# Patient Record
Sex: MALE | Race: BLACK OR AFRICAN AMERICAN | NOT HISPANIC OR LATINO | Employment: UNEMPLOYED | URBAN - METROPOLITAN AREA
[De-identification: names, ages, dates, MRNs, and addresses within clinical notes are randomized per-mention and may not be internally consistent; named-entity substitution may affect disease eponyms.]

---

## 2018-04-09 ENCOUNTER — HOSPITAL ENCOUNTER (OUTPATIENT)
Facility: CLINIC | Age: 63
Setting detail: OBSERVATION
Discharge: HOME | End: 2018-04-10
Attending: EMERGENCY MEDICINE | Admitting: INTERNAL MEDICINE
Payer: COMMERCIAL

## 2018-04-09 ENCOUNTER — APPOINTMENT (OUTPATIENT)
Dept: GENERAL RADIOLOGY | Facility: CLINIC | Age: 63
End: 2018-04-09
Attending: EMERGENCY MEDICINE
Payer: COMMERCIAL

## 2018-04-09 DIAGNOSIS — R07.9 ACUTE CHEST PAIN: ICD-10-CM

## 2018-04-09 LAB
ANION GAP SERPL CALCULATED.3IONS-SCNC: 11 MMOL/L (ref 3–14)
BASOPHILS # BLD AUTO: 0 10E9/L (ref 0–0.2)
BASOPHILS NFR BLD AUTO: 0.2 %
BUN SERPL-MCNC: 13 MG/DL (ref 7–30)
CALCIUM SERPL-MCNC: 8.2 MG/DL (ref 8.5–10.1)
CHLORIDE SERPL-SCNC: 106 MMOL/L (ref 94–109)
CO2 SERPL-SCNC: 25 MMOL/L (ref 20–32)
CREAT SERPL-MCNC: 1.17 MG/DL (ref 0.66–1.25)
DIFFERENTIAL METHOD BLD: NORMAL
EOSINOPHIL # BLD AUTO: 0.2 10E9/L (ref 0–0.7)
EOSINOPHIL NFR BLD AUTO: 2.5 %
ERYTHROCYTE [DISTWIDTH] IN BLOOD BY AUTOMATED COUNT: 13.2 % (ref 10–15)
GFR SERPL CREATININE-BSD FRML MDRD: 63 ML/MIN/1.7M2
GLUCOSE SERPL-MCNC: 84 MG/DL (ref 70–99)
HCT VFR BLD AUTO: 45.6 % (ref 40–53)
HGB BLD-MCNC: 15.7 G/DL (ref 13.3–17.7)
IMM GRANULOCYTES # BLD: 0 10E9/L (ref 0–0.4)
IMM GRANULOCYTES NFR BLD: 0.2 %
LIPASE SERPL-CCNC: 315 U/L (ref 73–393)
LYMPHOCYTES # BLD AUTO: 1.8 10E9/L (ref 0.8–5.3)
LYMPHOCYTES NFR BLD AUTO: 29.5 %
MCH RBC QN AUTO: 30 PG (ref 26.5–33)
MCHC RBC AUTO-ENTMCNC: 34.4 G/DL (ref 31.5–36.5)
MCV RBC AUTO: 87 FL (ref 78–100)
MONOCYTES # BLD AUTO: 0.5 10E9/L (ref 0–1.3)
MONOCYTES NFR BLD AUTO: 8.5 %
NEUTROPHILS # BLD AUTO: 3.5 10E9/L (ref 1.6–8.3)
NEUTROPHILS NFR BLD AUTO: 59.1 %
NRBC # BLD AUTO: 0 10*3/UL
NRBC BLD AUTO-RTO: 0 /100
PLATELET # BLD AUTO: 165 10E9/L (ref 150–450)
POTASSIUM SERPL-SCNC: 3.3 MMOL/L (ref 3.4–5.3)
RBC # BLD AUTO: 5.23 10E12/L (ref 4.4–5.9)
SODIUM SERPL-SCNC: 142 MMOL/L (ref 133–144)
TROPONIN I SERPL-MCNC: <0.015 UG/L (ref 0–0.04)
WBC # BLD AUTO: 6 10E9/L (ref 4–11)

## 2018-04-09 PROCEDURE — 80048 BASIC METABOLIC PNL TOTAL CA: CPT | Performed by: EMERGENCY MEDICINE

## 2018-04-09 PROCEDURE — 83690 ASSAY OF LIPASE: CPT | Performed by: EMERGENCY MEDICINE

## 2018-04-09 PROCEDURE — 80076 HEPATIC FUNCTION PANEL: CPT | Performed by: EMERGENCY MEDICINE

## 2018-04-09 PROCEDURE — 25000128 H RX IP 250 OP 636: Performed by: EMERGENCY MEDICINE

## 2018-04-09 PROCEDURE — 25000132 ZZH RX MED GY IP 250 OP 250 PS 637: Performed by: EMERGENCY MEDICINE

## 2018-04-09 PROCEDURE — 93005 ELECTROCARDIOGRAM TRACING: CPT

## 2018-04-09 PROCEDURE — 99285 EMERGENCY DEPT VISIT HI MDM: CPT | Mod: 25

## 2018-04-09 PROCEDURE — 80307 DRUG TEST PRSMV CHEM ANLYZR: CPT | Performed by: EMERGENCY MEDICINE

## 2018-04-09 PROCEDURE — 99220 ZZC INITIAL OBSERVATION CARE,LEVL III: CPT | Performed by: INTERNAL MEDICINE

## 2018-04-09 PROCEDURE — 25000125 ZZHC RX 250: Performed by: EMERGENCY MEDICINE

## 2018-04-09 PROCEDURE — 85025 COMPLETE CBC W/AUTO DIFF WBC: CPT | Performed by: EMERGENCY MEDICINE

## 2018-04-09 PROCEDURE — 94640 AIRWAY INHALATION TREATMENT: CPT

## 2018-04-09 PROCEDURE — 71046 X-RAY EXAM CHEST 2 VIEWS: CPT

## 2018-04-09 PROCEDURE — 96374 THER/PROPH/DIAG INJ IV PUSH: CPT

## 2018-04-09 PROCEDURE — 93005 ELECTROCARDIOGRAM TRACING: CPT | Mod: 76

## 2018-04-09 PROCEDURE — 84484 ASSAY OF TROPONIN QUANT: CPT | Performed by: EMERGENCY MEDICINE

## 2018-04-09 RX ORDER — NITROGLYCERIN 0.4 MG/1
0.4 TABLET SUBLINGUAL EVERY 5 MIN PRN
Status: DISCONTINUED | OUTPATIENT
Start: 2018-04-09 | End: 2018-04-10

## 2018-04-09 RX ORDER — IPRATROPIUM BROMIDE AND ALBUTEROL SULFATE 2.5; .5 MG/3ML; MG/3ML
3 SOLUTION RESPIRATORY (INHALATION) ONCE
Status: COMPLETED | OUTPATIENT
Start: 2018-04-09 | End: 2018-04-09

## 2018-04-09 RX ORDER — ASPIRIN 81 MG/1
81 TABLET ORAL DAILY
COMMUNITY
End: 2018-07-19

## 2018-04-09 RX ORDER — KETOROLAC TROMETHAMINE 15 MG/ML
15 INJECTION, SOLUTION INTRAMUSCULAR; INTRAVENOUS ONCE
Status: COMPLETED | OUTPATIENT
Start: 2018-04-09 | End: 2018-04-09

## 2018-04-09 RX ORDER — ASPIRIN 81 MG/1
324 TABLET, CHEWABLE ORAL ONCE
Status: COMPLETED | OUTPATIENT
Start: 2018-04-09 | End: 2018-04-09

## 2018-04-09 RX ADMIN — NITROGLYCERIN 0.4 MG: 0.4 TABLET SUBLINGUAL at 22:38

## 2018-04-09 RX ADMIN — KETOROLAC TROMETHAMINE 15 MG: 15 INJECTION, SOLUTION INTRAMUSCULAR; INTRAVENOUS at 23:15

## 2018-04-09 RX ADMIN — ASPIRIN 81 MG 324 MG: 81 TABLET ORAL at 21:48

## 2018-04-09 RX ADMIN — NITROGLYCERIN 0.4 MG: 0.4 TABLET SUBLINGUAL at 22:46

## 2018-04-09 RX ADMIN — IPRATROPIUM BROMIDE AND ALBUTEROL SULFATE 3 ML: .5; 3 SOLUTION RESPIRATORY (INHALATION) at 22:04

## 2018-04-09 RX ADMIN — LIDOCAINE HYDROCHLORIDE 30 ML: 20 SOLUTION ORAL; TOPICAL at 22:56

## 2018-04-09 ASSESSMENT — ENCOUNTER SYMPTOMS
SHORTNESS OF BREATH: 1
DIZZINESS: 0
NAUSEA: 0
FEVER: 0
VOMITING: 0

## 2018-04-09 NOTE — IP AVS SNAPSHOT
MRN:[id number]                      After Visit Summary   4/9/2018    Abhilash Gonzalez    MRN: [id number]           Thank you!     Thank you for choosing [facility] for your care. Our goal is always to provide you with excellent care. Hearing back from our patients is one way we can continue to improve our services. Please take a few minutes to complete the written survey that you may receive in the mail after you visit with us. Thank you!        Patient Information     Date Of Birth          1955        About your hospital stay     You were admitted on:  April 9, 2018 [facility]    You were discharged on:  April 10, 2018        Reason for your hospital stay       You were registered to observation due to chest pain.                  Who to Call     For medical emergencies, please call 911.  For non-urgent questions about your medical care, please call your primary care provider or clinic, None          Attending Provider     Provider Specialty    Tim Swain MD Emergency Medicine    Neida Blair MD Internal Medicine       Primary Care Provider Fax #    Physician No Ref-Primary [phone]      After Care Instructions     Activity       Your activity upon discharge: activity as tolerated            Diet       Follow this diet upon discharge: Orders Placed This Encounter      Combination Diet Low Saturated Fat Na <2400mg Diet, No Caffeine Diet            Discharge Instructions       Advised to use over the counter tylenol 1000 mg at breakfast and dinner as well as over the counter Ibuprofen 600 mg with lunch for 4 days.  Would also recommend use of heating pad and ice as needed for comfort.                  Follow-up Appointments     Follow-up and recommended labs and tests        Follow up with primary care provider, Physician No Ref-Primary, within 7 days for hospital follow- up.  No follow up labs or test are needed.             "Follow-up and recommended labs and tests        Please Follow-up with your new Primary Care Physician  [facility]  Dr Tessie Herman  10:00 AM  [address] [place] [address]  [phone]   (enter through the [facility] building)                  Pending Results     No orders found for last 3 day(s).            Statement of Approval     Ordered          04/10/18 1409  I have reviewed and agree with all the recommendations and orders detailed in this document.  EFFECTIVE NOW     Approved and electronically signed by:  Bruce Youssef PA-C             Admission Information     Date & Time Provider Department Dept. Phone    2018 Neida Blair MD [facility] [phone]      Your Vitals Were     Blood Pressure Pulse Temperature Respirations Height Weight    164/86 107 96.4  F (35.8  C) (Oral) 18 1.727 m (5' 8\") 87.2 kg (192 lb 3.2 oz)    Pulse Oximetry BMI (Body Mass Index)                95% 29.22 kg/m2          MyChart Information     [facility] lets you send messages to your doctor, view your test results, renew your prescriptions, schedule appointments and more. To sign up, go to [email or web address] . Click on \"Log in\" on the left side of the screen, which will take you to the Welcome page. Then click on \"Sign up Now\" on the right side of the page.     You will be asked to enter the access code listed below, as well as some personal information. Please follow the directions to create your username and password.     Your access code is: [email or web address]  Expires: 2018  1:44 PM     Your access code will  in 90 days. If you need help or a new code, please call your [facility] clinic or [phone].        Care EveryWhere ID     This is your Care EveryWhere ID. This could be used by other organizations to access your [facility] medical records  [id number]        Equal Access to Services     JASE KUMAR AH: bryant Shoemaker qaybta " hyacinth rodriguezjasper raineyaan ah. Rosaura [facility] [phone].    ATENCIÓN: Si randi chen, tiene a sahu disposición servicios gratuitos de asistencia lingüística. Bean al [phone].    We comply with applicable federal civil rights laws and [place] laws. We do not discriminate on the basis of race, color, national origin, age, disability, sex, sexual orientation, or gender identity.               Review of your medicines      CONTINUE these medicines which have NOT CHANGED        Dose / Directions    albuterol 108 (90 Base) MCG/ACT Inhaler   Commonly known as:  PROAIR HFA/PROVENTIL HFA/VENTOLIN HFA        Dose:  2 puff   Inhale 2 puffs into the lungs every 6 hours   Refills:  0       amLODIPine 5 MG tablet   Commonly known as:  NORVASC   Used for:  Essential hypertension        Dose:  5 mg   Take 1 tablet (5 mg) by mouth daily   Quantity:  30 tablet   Refills:  3       aspirin EC 81 MG EC tablet        Dose:  81 mg   Take 81 mg by mouth daily   Refills:  0       FLOMAX 0.4 MG capsule   Generic drug:  tamsulosin   Notes to Patient:  Continue to take as prior to admission        Dose:  0.4 mg   Take 0.4 mg by mouth At Bedtime   Refills:  0       gabapentin 300 MG capsule   Commonly known as:  NEURONTIN   Used for:  Low back pain without sciatica, unspecified back pain laterality   Notes to Patient:  Continue to take as prior to admission        Dose:  300 mg   Take 1 capsule (300 mg) by mouth 3 times daily   Quantity:  90 capsule   Refills:  3       nitroGLYcerin 0.4 MG sublingual tablet   Commonly known as:  NITROSTAT   Notes to Patient:  Continue to take as prior to admission        Dose:  0.4 mg   Place 1 tablet (0.4 mg) under the tongue every 5 minutes as needed for chest pain If you are still having symptoms after 3 doses (15 minutes) call 911.   Quantity:  25 tablet   Refills:  0       ZOCOR PO   Notes to Patient:  Continue to take as prior to admission        Dose:  40 mg   Take 40  mg by mouth At Bedtime   Refills:  0                Protect others around you: Learn how to safely use, store and throw away your medicines at www.disposemymeds.org.             Medication List: This is a list of all your medications and when to take them. Check marks below indicate your daily home schedule. Keep this list as a reference.      Medications           Morning Afternoon Evening Bedtime As Needed    albuterol 108 (90 Base) MCG/ACT Inhaler   Commonly known as:  PROAIR HFA/PROVENTIL HFA/VENTOLIN HFA   Inhale 2 puffs into the lungs every 6 hours                                amLODIPine 5 MG tablet   Commonly known as:  NORVASC   Take 1 tablet (5 mg) by mouth daily   Last time this was given:  5 mg on 4/10/2018 11:58 AM                                   aspirin EC 81 MG EC tablet   Take 81 mg by mouth daily   Last time this was given:  81 mg on 4/10/2018 11:59 AM                                   FLOMAX 0.4 MG capsule   Take 0.4 mg by mouth At Bedtime   Generic drug:  tamsulosin   Notes to Patient:  Continue to take as prior to admission                                gabapentin 300 MG capsule   Commonly known as:  NEURONTIN   Take 1 capsule (300 mg) by mouth 3 times daily   Notes to Patient:  Continue to take as prior to admission                                nitroGLYcerin 0.4 MG sublingual tablet   Commonly known as:  NITROSTAT   Place 1 tablet (0.4 mg) under the tongue every 5 minutes as needed for chest pain If you are still having symptoms after 3 doses (15 minutes) call 911.   Last time this was given:  0.4 mg on 4/10/2018 10:23 AM   Notes to Patient:  Continue to take as prior to admission                                ZOCOR PO   Take 40 mg by mouth At Bedtime   Notes to Patient:  Continue to take as prior to admission

## 2018-04-09 NOTE — IP AVS SNAPSHOT
[facility]    [address] [place] [id number]    Phone:  [phone]                                       After Visit Summary   4/9/2018    Abhilash Gonzalez    MRN: [id number]           After Visit Summary Signature Page     I have received my discharge instructions, and my questions have been answered. I have discussed any challenges I see with this plan with the nurse or doctor.    ..........................................................................................................................................  Patient/Patient Representative Signature      ..........................................................................................................................................  Patient Representative Print Name and Relationship to Patient    ..................................................               ................................................  Date                                            Time    ..........................................................................................................................................  Reviewed by Signature/Title    ...................................................              ..............................................  Date                                                            Time

## 2018-04-10 ENCOUNTER — APPOINTMENT (OUTPATIENT)
Dept: NUCLEAR MEDICINE | Facility: CLINIC | Age: 63
End: 2018-04-10
Attending: INTERNAL MEDICINE
Payer: COMMERCIAL

## 2018-04-10 ENCOUNTER — APPOINTMENT (OUTPATIENT)
Dept: CARDIOLOGY | Facility: CLINIC | Age: 63
End: 2018-04-10
Attending: INTERNAL MEDICINE
Payer: COMMERCIAL

## 2018-04-10 VITALS
BODY MASS INDEX: 29.13 KG/M2 | HEART RATE: 107 BPM | TEMPERATURE: 96.4 F | RESPIRATION RATE: 18 BRPM | DIASTOLIC BLOOD PRESSURE: 86 MMHG | HEIGHT: 68 IN | SYSTOLIC BLOOD PRESSURE: 164 MMHG | OXYGEN SATURATION: 95 % | WEIGHT: 192.2 LBS

## 2018-04-10 LAB
ALBUMIN SERPL-MCNC: 3.5 G/DL (ref 3.4–5)
ALP SERPL-CCNC: 81 U/L (ref 40–150)
ALT SERPL W P-5'-P-CCNC: 42 U/L (ref 0–70)
AMPHETAMINES UR QL SCN: NEGATIVE
AST SERPL W P-5'-P-CCNC: 26 U/L (ref 0–45)
BARBITURATES UR QL: NEGATIVE
BENZODIAZ UR QL: NEGATIVE
BILIRUB DIRECT SERPL-MCNC: 0.2 MG/DL (ref 0–0.2)
BILIRUB SERPL-MCNC: 0.8 MG/DL (ref 0.2–1.3)
CANNABINOIDS UR QL SCN: POSITIVE
COCAINE UR QL: NEGATIVE
HBA1C MFR BLD: 5.7 % (ref 0–6.4)
INTERPRETATION ECG - MUSE: NORMAL
INTERPRETATION ECG - MUSE: NORMAL
OPIATES UR QL SCN: NEGATIVE
PCP UR QL SCN: NEGATIVE
POTASSIUM SERPL-SCNC: 4.5 MMOL/L (ref 3.4–5.3)
PROT SERPL-MCNC: 7.7 G/DL (ref 6.8–8.8)
TROPONIN I SERPL-MCNC: <0.015 UG/L (ref 0–0.04)
TROPONIN I SERPL-MCNC: <0.015 UG/L (ref 0–0.04)

## 2018-04-10 PROCEDURE — 25000128 H RX IP 250 OP 636: Performed by: INTERNAL MEDICINE

## 2018-04-10 PROCEDURE — 40000855 ZZH STATISTIC ECHO STRESS OR NM NPI

## 2018-04-10 PROCEDURE — A9502 TC99M TETROFOSMIN: HCPCS | Performed by: INTERNAL MEDICINE

## 2018-04-10 PROCEDURE — 78452 HT MUSCLE IMAGE SPECT MULT: CPT | Mod: 26 | Performed by: INTERNAL MEDICINE

## 2018-04-10 PROCEDURE — G0378 HOSPITAL OBSERVATION PER HR: HCPCS

## 2018-04-10 PROCEDURE — 93017 CV STRESS TEST TRACING ONLY: CPT

## 2018-04-10 PROCEDURE — 78452 HT MUSCLE IMAGE SPECT MULT: CPT

## 2018-04-10 PROCEDURE — 99217 ZZC OBSERVATION CARE DISCHARGE: CPT | Performed by: PHYSICIAN ASSISTANT

## 2018-04-10 PROCEDURE — 34300033 ZZH RX 343: Performed by: INTERNAL MEDICINE

## 2018-04-10 PROCEDURE — 93018 CV STRESS TEST I&R ONLY: CPT | Performed by: INTERNAL MEDICINE

## 2018-04-10 PROCEDURE — 84484 ASSAY OF TROPONIN QUANT: CPT | Performed by: INTERNAL MEDICINE

## 2018-04-10 PROCEDURE — 84132 ASSAY OF SERUM POTASSIUM: CPT | Performed by: INTERNAL MEDICINE

## 2018-04-10 PROCEDURE — 83036 HEMOGLOBIN GLYCOSYLATED A1C: CPT | Performed by: INTERNAL MEDICINE

## 2018-04-10 PROCEDURE — 36415 COLL VENOUS BLD VENIPUNCTURE: CPT | Performed by: INTERNAL MEDICINE

## 2018-04-10 PROCEDURE — 25000132 ZZH RX MED GY IP 250 OP 250 PS 637: Performed by: INTERNAL MEDICINE

## 2018-04-10 PROCEDURE — 93016 CV STRESS TEST SUPVJ ONLY: CPT | Performed by: INTERNAL MEDICINE

## 2018-04-10 RX ORDER — ACETAMINOPHEN 650 MG/1
650 SUPPOSITORY RECTAL EVERY 4 HOURS PRN
Status: DISCONTINUED | OUTPATIENT
Start: 2018-04-10 | End: 2018-04-10 | Stop reason: HOSPADM

## 2018-04-10 RX ORDER — POTASSIUM CHLORIDE 1500 MG/1
20-40 TABLET, EXTENDED RELEASE ORAL
Status: DISCONTINUED | OUTPATIENT
Start: 2018-04-10 | End: 2018-04-10 | Stop reason: HOSPADM

## 2018-04-10 RX ORDER — ALUMINA, MAGNESIA, AND SIMETHICONE 2400; 2400; 240 MG/30ML; MG/30ML; MG/30ML
30 SUSPENSION ORAL EVERY 4 HOURS PRN
Status: DISCONTINUED | OUTPATIENT
Start: 2018-04-10 | End: 2018-04-10 | Stop reason: HOSPADM

## 2018-04-10 RX ORDER — REGADENOSON 0.08 MG/ML
0.4 INJECTION, SOLUTION INTRAVENOUS ONCE
Status: COMPLETED | OUTPATIENT
Start: 2018-04-10 | End: 2018-04-10

## 2018-04-10 RX ORDER — IBUPROFEN 600 MG/1
600 TABLET, FILM COATED ORAL EVERY 6 HOURS PRN
Status: DISCONTINUED | OUTPATIENT
Start: 2018-04-10 | End: 2018-04-10 | Stop reason: HOSPADM

## 2018-04-10 RX ORDER — TAMSULOSIN HYDROCHLORIDE 0.4 MG/1
0.4 CAPSULE ORAL AT BEDTIME
COMMUNITY
End: 2018-07-19

## 2018-04-10 RX ORDER — POTASSIUM CHLORIDE 29.8 MG/ML
20 INJECTION INTRAVENOUS
Status: DISCONTINUED | OUTPATIENT
Start: 2018-04-10 | End: 2018-04-10 | Stop reason: HOSPADM

## 2018-04-10 RX ORDER — ASPIRIN 81 MG/1
162 TABLET, CHEWABLE ORAL ONCE
Status: DISCONTINUED | OUTPATIENT
Start: 2018-04-10 | End: 2018-04-10 | Stop reason: HOSPADM

## 2018-04-10 RX ORDER — ACETAMINOPHEN 325 MG/1
650 TABLET ORAL EVERY 4 HOURS PRN
Status: DISCONTINUED | OUTPATIENT
Start: 2018-04-10 | End: 2018-04-10 | Stop reason: HOSPADM

## 2018-04-10 RX ORDER — NALOXONE HYDROCHLORIDE 0.4 MG/ML
.1-.4 INJECTION, SOLUTION INTRAMUSCULAR; INTRAVENOUS; SUBCUTANEOUS
Status: DISCONTINUED | OUTPATIENT
Start: 2018-04-10 | End: 2018-04-10 | Stop reason: HOSPADM

## 2018-04-10 RX ORDER — KETOROLAC TROMETHAMINE 15 MG/ML
15 INJECTION, SOLUTION INTRAMUSCULAR; INTRAVENOUS EVERY 6 HOURS PRN
Status: DISCONTINUED | OUTPATIENT
Start: 2018-04-10 | End: 2018-04-10 | Stop reason: HOSPADM

## 2018-04-10 RX ORDER — ALBUTEROL SULFATE 90 UG/1
2 AEROSOL, METERED RESPIRATORY (INHALATION) EVERY 6 HOURS
COMMUNITY
End: 2018-06-18

## 2018-04-10 RX ORDER — ASPIRIN 81 MG/1
81 TABLET ORAL DAILY
Status: DISCONTINUED | OUTPATIENT
Start: 2018-04-10 | End: 2018-04-10 | Stop reason: HOSPADM

## 2018-04-10 RX ORDER — OXYCODONE HYDROCHLORIDE 5 MG/1
5 TABLET ORAL EVERY 6 HOURS PRN
Status: DISCONTINUED | OUTPATIENT
Start: 2018-04-10 | End: 2018-04-10 | Stop reason: HOSPADM

## 2018-04-10 RX ORDER — LIDOCAINE 40 MG/G
CREAM TOPICAL
Status: DISCONTINUED | OUTPATIENT
Start: 2018-04-10 | End: 2018-04-10 | Stop reason: HOSPADM

## 2018-04-10 RX ORDER — AMLODIPINE BESYLATE 5 MG/1
5 TABLET ORAL DAILY
Status: DISCONTINUED | OUTPATIENT
Start: 2018-04-10 | End: 2018-04-10 | Stop reason: HOSPADM

## 2018-04-10 RX ORDER — NITROGLYCERIN 0.4 MG/1
0.4 TABLET SUBLINGUAL EVERY 5 MIN PRN
Status: DISCONTINUED | OUTPATIENT
Start: 2018-04-10 | End: 2018-04-10 | Stop reason: HOSPADM

## 2018-04-10 RX ORDER — POTASSIUM CHLORIDE 7.45 MG/ML
10 INJECTION INTRAVENOUS
Status: DISCONTINUED | OUTPATIENT
Start: 2018-04-10 | End: 2018-04-10 | Stop reason: HOSPADM

## 2018-04-10 RX ORDER — AMINOPHYLLINE 25 MG/ML
50-100 INJECTION, SOLUTION INTRAVENOUS
Status: DISCONTINUED | OUTPATIENT
Start: 2018-04-10 | End: 2018-04-10 | Stop reason: HOSPADM

## 2018-04-10 RX ORDER — ACYCLOVIR 200 MG/1
0-1 CAPSULE ORAL
Status: DISCONTINUED | OUTPATIENT
Start: 2018-04-10 | End: 2018-04-10 | Stop reason: HOSPADM

## 2018-04-10 RX ORDER — ALBUTEROL SULFATE 90 UG/1
2 AEROSOL, METERED RESPIRATORY (INHALATION) EVERY 5 MIN PRN
Status: DISCONTINUED | OUTPATIENT
Start: 2018-04-10 | End: 2018-04-10 | Stop reason: HOSPADM

## 2018-04-10 RX ORDER — POTASSIUM CHLORIDE 1.5 G/1.58G
20-40 POWDER, FOR SOLUTION ORAL
Status: DISCONTINUED | OUTPATIENT
Start: 2018-04-10 | End: 2018-04-10 | Stop reason: HOSPADM

## 2018-04-10 RX ORDER — POTASSIUM CL/LIDO/0.9 % NACL 10MEQ/0.1L
10 INTRAVENOUS SOLUTION, PIGGYBACK (ML) INTRAVENOUS
Status: DISCONTINUED | OUTPATIENT
Start: 2018-04-10 | End: 2018-04-10 | Stop reason: HOSPADM

## 2018-04-10 RX ADMIN — ASPIRIN 81 MG: 81 TABLET, COATED ORAL at 11:59

## 2018-04-10 RX ADMIN — OXYCODONE HYDROCHLORIDE 5 MG: 5 TABLET ORAL at 04:46

## 2018-04-10 RX ADMIN — AMLODIPINE BESYLATE 5 MG: 5 TABLET ORAL at 11:58

## 2018-04-10 RX ADMIN — POTASSIUM CHLORIDE 20 MEQ: 1500 TABLET, EXTENDED RELEASE ORAL at 04:39

## 2018-04-10 RX ADMIN — POTASSIUM CHLORIDE 40 MEQ: 1500 TABLET, EXTENDED RELEASE ORAL at 02:08

## 2018-04-10 RX ADMIN — TETROFOSMIN 32.3 MCI.: 1.38 INJECTION, POWDER, LYOPHILIZED, FOR SOLUTION INTRAVENOUS at 10:13

## 2018-04-10 RX ADMIN — NITROGLYCERIN 0.4 MG: 0.4 TABLET SUBLINGUAL at 10:23

## 2018-04-10 RX ADMIN — TETROFOSMIN 11 MCI.: 1.38 INJECTION, POWDER, LYOPHILIZED, FOR SOLUTION INTRAVENOUS at 07:30

## 2018-04-10 RX ADMIN — REGADENOSON 0.4 MG: 0.08 INJECTION, SOLUTION INTRAVENOUS at 09:58

## 2018-04-10 NOTE — PROGRESS NOTES
MD Notification    Notified Person: MD    Notified Person Name:  Rossy    Notification Date/Time:4/10/2018    Notification Interaction: MD Rui called back    Purpose of Notification: Potassium replacement protocol     Orders Received: MD put it.    Comments:

## 2018-04-10 NOTE — PROGRESS NOTES
RECEIVING UNIT ED HANDOFF REVIEW    ED Nurse Handoff Report was reviewed by: Elvis Olivo on April 9, 2018 at 11:59 PM

## 2018-04-10 NOTE — PROGRESS NOTES
Patient given SL nitro.  Will continue to monitor.  Patient has completed first part of NM stress test.  He is hungry and has ordered breakfast.

## 2018-04-10 NOTE — ED NOTES
[facility]  ED Nurse Handoff Report    ED Chief complaint: Chest Pain (CP starting 1800, said it has been getting worse. Also c/o SOB)      ED Diagnosis:   Final diagnoses:   None       Code Status: tbd by hospitalist    Allergies:   Allergies   Allergen Reactions     Vicodin [Hydrocodone-Acetaminophen] Itching       Activity level - Baseline/Home:  Independent    Activity Level - Current:   Independent     Needed?: No    Isolation: No  Infection: Not Applicable    Bariatric?: No    Vital Signs:   Vitals:    04/09/18 2209 04/09/18 2215 04/09/18 2230 04/09/18 2245   BP:   (!) 152/98    Pulse:       Resp: 13 16 16 22   Temp:       TempSrc:       SpO2: 100% 98% 97% 92%   Weight:       Height:           Cardiac Rhythm: ,   Cardiac  Cardiac Rhythm: Normal sinus rhythm    Pain level: 0-10 Pain Scale: 10    Is this patient confused?: No     Patient Report: Initial Complaint: CP  Focused Assessment: Onset of pain, 10/10, at work at 1800 tonight. Slight sob. Pt states he has had this pain on average 2-3x yearly his whole adult life. States Toradol brought his pain level to 9. Hx HTN  Tests Performed: labs, UA tox screen, EKG, CXR  Abnormal Results: none  Treatments provided: Duoneb, ASA, nitro tabs x2, gi cocktail, 15 mg toradol    Family Comments: none    OBS brochure/video discussed/provided to patient: Yes    ED Medications:   Medications   nitroGLYcerin (NITROSTAT) sublingual tablet 0.4 mg (0.4 mg Sublingual Given 4/9/18 2246)   aspirin chewable tablet 324 mg (324 mg Oral Given 4/9/18 2148)   ipratropium - albuterol 0.5 mg/2.5 mg/3 mL (DUONEB) neb solution 3 mL (3 mLs Nebulization Given 4/9/18 2204)   lidocaine (viscous) (XYLOCAINE) 2 % 15 mL, alum & mag hydroxide-simethicone (MYLANTA ES/MAALOX  ES) 15 mL GI Cocktail (30 mLs Oral Given 4/9/18 2256)   ketorolac (TORADOL) injection 15 mg (15 mg Intravenous Given 4/9/18 2315)       Drips infusing?:  No    For the majority of the shift this  patient was Green.   Interventions performed were none.    Severe Sepsis OR Septic Shock Diagnosis Present: No      ED NURSE PHONE NUMBER: [phone]

## 2018-04-10 NOTE — PROGRESS NOTES
List all goals to be met before discharge home:   - Serial troponins and stress test complete. - partially met, stress test to be completed  - Seen and cleared by consultant if applicable - NA  - Adequate pain control on oral analgesia - not met, continued CP  - Vital signs normal or at patient baseline - not met  - Safe disposition plan has been identified - not met

## 2018-04-10 NOTE — PROGRESS NOTES
List all goals to be met before discharge home:   - Serial troponins and stress test complete. - partially met, nuc med stress test in process  - Seen and cleared by consultant if applicable - NA  - Adequate pain control on oral analgesia - not met, continued CP  - Vital signs normal or at patient baseline - not met  - Safe disposition plan has been identified - not met

## 2018-04-10 NOTE — PROGRESS NOTES
"Tolerated lexiscan stress test without diff.  Able to ambulate on treadmill without diff.  Pt hypertensive pre and post procedure.   Reported SOB and minor increase in CP \"maybe a little bit more\".  Symptoms resolved, CP back to baseline.  Brought back to room in stable condition.  "

## 2018-04-10 NOTE — ED PROVIDER NOTES
History     Chief Complaint:  Chest pain      HPI   Abhilash Gonzalez is a 62 year old male with history of hypertension, hyperlipidemia, cocaine abuse, and latent tuberculosis who presents with chest pain. 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 The chest pain initially radiated to the epigastric region but now only in the chest. Here, he complains of continued chest pain and shortness of breath. He had hamburgers for dinner. He denies fever, dizziness, nausea, vomiting, or any additional symptoms.      CARDIAC RISK FACTORS:  Sex:    Male  Tobacco:   Someday smoker  Hypertension:   Yes  Hyperlipidemia:  Yes  Diabetes:   No  Family History:  No    PE/DVT RISK FACTORS:  Sex:    Male  Hormones:   No  Tobacco:   Someday smoker   Cancer:   No  Travel:   No  Surgery:   No  Other immobilization: No  Personal history:  No  Family history:  No     Allergies:  Vicodin     Medications:    Aspirin   Nitrostat  Norvasc  Neurontin     Past Medical History:    Cecal volvulus   Depressive disorder  Neuralgia and neuritis  Chronic low back pain   DDD  Osteoarthritis   Chronic low back pain   Hyperlipidemia   Hypertension  Cocaine abuse   Tobacco use disorder  Tuberculosis latent  Polysubstance abuse  Depression     Past Surgical History:    Bronchoscopy  Colonoscopy  Hand surgery  Mandible surgery  Knee surgery    Family History:    Hypertension - mother  Cerebrovascular disease - brother    Social History:  Smoking status: Someday smoker  Alcohol use: No   Marital Status:  Single      Review of Systems   Constitutional: Negative for fever.   Respiratory: Positive for shortness of breath.    Cardiovascular: Positive for chest pain.   Gastrointestinal: Negative for nausea and vomiting.   Neurological: Negative for dizziness.   All other systems reviewed and are negative.    Physical Exam   First Vitals:   BP Temp Temp src Pulse  "Heart Rate Resp SpO2 Height Weight   04/09/18 2245 - - - - 112 22 92 % - -   04/09/18 2230 (!) 152/98 - - - 100 16 97 % - -   04/09/18 2215 - - - - 98 16 98 % - -   04/09/18 2209 - - - - 91 13 100 % - -   04/09/18 2200 - - - - - - 96 % - -   04/09/18 2158 (!) 150/107 - - - - - - - -   04/09/18 2129 159/88 98.3  F (36.8  C) Oral 106 - 16 98 % 1.727 m (5' 8\") 87.2 kg (192 lb 3.2 oz)      Physical Exam  GENERAL: well developed, pleasant  HEAD: atraumatic  EYES: pupils reactive, extraocular muscles intact, conjunctivae normal  ENT:  mucus membranes moist  NECK:  trachea midline, normal range of motion  RESPIRATORY: no tachypnea, scattered wheezes  CVS: normal S1/S2, no murmurs, intact distal pulses, no reproducible chest pain   ABDOMEN: soft, nontender, nondistention  MUSCULOSKELETAL: no deformities  SKIN: warm and dry, no acute rashes or ulceration  NEURO: GCS 15, cranial nerves intact, alert and oriented x3  PSYCH:  Mood/affect normal    Emergency Department Course   ECG:  @ 2133  Indication: Chest pain   Vent. Rate 109 bpm. ND interval 134 ms. QRS duration 88 ms. QT/QTc 372/500 ms. P-R-T axis 65 42 66.   Sinus tachycardia. Otherwise normal ECG.   Read @ 2137 by Dr. Swain.     ECG #2  @ 0099  Vent. Rate 105 bpm. ND interval 134 ms. QRS duration 78 ms. QT/QTc 380/502 ms. P-R-T axis 64 47 77.   Sinus tachycardia. Otherwise normal ECG.  No significant change when compared to previous ECG.   Read @ 2327 by Dr. Swain. .    Imaging:  Radiographic findings were communicated with the patient who voiced understanding of the findings.    X-ray Chest, 2 views:  No acute infiltrate. No significant changes.   Result per radiology.      Laboratory:  CBC: WNL (WBC 6.0, HGB 15.7, )   BMP: potassium 3.3(L), calcium 8.2(L), o/w WNL (Creatinine 1.17)   2145 Troponin: <0.015    Hepatic panel: Bili Direct 0.2, Bili Total 0.8, Albumin 3.5, Protein Total 7.7, Alkphos 81, ALT 42, AST 26.  Lipase: 315     Drug abuse screen 77 urine: " POSITIVE for cannabinoids. Otherwise negative.     Interventions:  2148 Aspirin 324 mg PO   2204 Duoneb 3 mL Nebulization   2246 Nitrostat 0.4 mg sublingual   2256 GI Cocktail - Maalox 15 mL, Viscous Lidocaine 15 mL, 30 mL suspension PO  2315 Toradol 15 mg IV     Emergency Department Course:  Past medical records, nursing notes, and vitals reviewed.  2135: I performed an exam of the patient and obtained history, as documented above.   IV started, labs were drawn, and interventions given as above.    The patient was sent for an X-ray while in the emergency department, findings above.    Findings and plan explained to the Patient who consents to admission.   2337: Discussed the patient with Dr. Blair, who will admit the patient to an observation bed for further monitoring, evaluation, and treatment.     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  He describes it as a central chest pain.  The patient has a few risk factors, but also in the charts note a history of cocaine use.  He denies any recent use and states that has been a couple of years.  A broad differential was considered ranging from acute coronary syndrome/unstable angina, GERD, biliary colic, PE, dissection, chest wall pain, among others.  EKG ×2 is normal.  Initial laboratory testing is unremarkable.  The patient was given nitroglycerin and aspirin with no change in symptoms, GI cocktail with numbing of his throat but no change in his chest pain, Toradol.  Currently  waiting on a urine drug screen as well as LFTs and lipase.  Given the ongoing chest pain and risk factors and symptoms starting just prior to arrival, the patient will be admitted for cardiac rule out.      Diagnosis:    ICD-10-CM   1. Acute chest pain R07.9     Disposition:  Admission to the observation unit.          EMERGENCY DEPARTMENT    I, Marcio Kuo, am serving as a scribe  at 9:35 PM on 4/9/2018 to document services personally performed by Tim Swain MD based on my observations and the provider's statements to me.       Tim Swain MD  04/10/18 0966

## 2018-04-10 NOTE — PLAN OF CARE
Problem: Patient Care Overview  Goal: Plan of Care/Patient Progress Review  Outcome: Adequate for Discharge Date Met: 04/10/18  VSS, CP tx with nitro, see provider note. Mayra scan completed. Up ind, showered. Brianna diet. DC instructions reviewed, plan to DC when ride arrives.

## 2018-04-10 NOTE — DISCHARGE SUMMARY
Admit Date:     04/09/2018   Discharge Date:     04/10/2018      PRIMARY CARE PHYSICIAN:  [facility].        DISCHARGE DIAGNOSES:   1.  Chest pain, atypical.   2.  Hypertension.   3.  Dyslipidemia.   4.  Hypokalemia.   5.  Tobacco abuse.   6.  Latent TB for which the patient has completed a full course of therapy (per patient's report).   7.  Chronic neck and low back pain.   8.  Major depressive disorder.   9.  History of cocaine abuse in remission.   10.  Marijuana use.   11.  BPH.       DISCHARGE MEDICATIONS:      Review of your medicines      CONTINUE these medicines which have NOT CHANGED       Dose / Directions    albuterol 108 (90 Base) MCG/ACT Inhaler   Commonly known as:  PROAIR HFA/PROVENTIL HFA/VENTOLIN HFA        Dose:  2 puff   Inhale 2 puffs into the lungs every 6 hours   Refills:  0       amLODIPine 5 MG tablet   Commonly known as:  NORVASC   Used for:  Essential hypertension        Dose:  5 mg   Take 1 tablet (5 mg) by mouth daily   Quantity:  30 tablet   Refills:  3       aspirin EC 81 MG EC tablet        Dose:  81 mg   Take 81 mg by mouth daily   Refills:  0       FLOMAX 0.4 MG capsule   Generic drug:  tamsulosin   Notes to Patient:  Continue to take as prior to admission        Dose:  0.4 mg   Take 0.4 mg by mouth At Bedtime   Refills:  0       gabapentin 300 MG capsule   Commonly known as:  NEURONTIN   Used for:  Low back pain without sciatica, unspecified back pain laterality   Notes to Patient:  Continue to take as prior to admission        Dose:  300 mg   Take 1 capsule (300 mg) by mouth 3 times daily   Quantity:  90 capsule   Refills:  3       nitroGLYcerin 0.4 MG sublingual tablet   Commonly known as:  NITROSTAT   Notes to Patient:  Continue to take as prior to admission        Dose:  0.4 mg   Place 1 tablet (0.4 mg) under the tongue every 5 minutes as needed for chest pain If you are still having symptoms after 3 doses (15 minutes) call 911.   Quantity:  25 tablet   Refills:  0        ZOCOR PO   Notes to Patient:  Continue to take as prior to admission        Dose:  40 mg   Take 40 mg by mouth At Bedtime   Refills:  0           ALLERGIES:  VICODIN.      DISPOSITION:  Home.      FOLLOW UP WITH RECOMMENDATIONS:  The patient will follow up with primary care provider patient is requesting to be seen tomorrow to discuss pain management.      AFTERCARE INSTRUCTIONS:    1.  Activity as tolerated.   2.  Diet, recommend low saturated fat with a low salt diet and minimal caffeine.   3.  Discharge instructions:  Patient has been advised to utilize over-the-counter Tylenol 1000 mg at breakfast and at dinner as well as over-the-counter ibuprofen 600 mg with lunch for 4 days.  The patient has also been advised to utilize a heating pad and ice as needed for comfort.      CONSULTS:  None.        LABORATORY IMAGING AND PROCEDURES:    1.  Routine laboratory studies that included CBC with platelet, differential, basic metabolic panel, serial troponin, hepatic panel, lipase level, urine drug screen, potassium level and hemoglobin A1c.   2.  Two view chest x-ray.   3.  EKG x2.   4.  Nuclear medicine Lexiscan stress test of the heart.      PENDING RESULTS:  None.      PHYSICAL EXAM ON DAY OF DISCHARGE:     VITAL SIGNS:  Temperature is 96.4 degrees Fahrenheit with a blood pressure of 164/86, heart rate of 108 beats per minute, respiratory rate of 18.  The patient's O2 saturation was 95% on room air.  The patient was describing some chest pain last rated, I think a 5/10 earlier this morning.     GENERAL:  The patient is awake, alert, cooperative, in no apparent distress, alert and oriented x3.   HEENT:  Normocephalic, atraumatic.  Moist mucous membranes present.  No exudates noted in the posterior pharynx.  Uvula is midline.     NECK:  Supple, normal range of motion, no tracheal deviation, no cervical lymphadenopathy present.     CARDIOVASCULAR:  Regular rate and rhythm, no rubs, murmurs or gallops appreciated.      PULMONARY:  Lungs are clear to auscultation bilaterally, no wheezes, rhonchi or rales appreciated.     GI:  Bowel sounds are present in all 4 quadrants, soft, nontender, nondistended.     NEUROLOGIC:  Cranial nerves II-XII are grossly intact.  The patient is seen moving all 4 extremities, without any difficulty.     EXTREMITIES:  No lower extremity edema noted bilaterally.  Calves are nontender to palpation and dorsal pedal pulses are palpable bilaterally.      BRIEF HISTORY OF PRESENT ILLNESS:  Abhilash Gonzalez is a 62-year-old male with a past medical history significant for hypertension, dyslipidemia, tobacco abuse, chronic neck and low back pain, major depressive disorder, history of cocaine abuse, which is currently in remission, latent TB for which the patient has completed a full course of therapy who was registered observation on 04/09/2018 due to central chest pain.      HOSPITAL COURSE:    1.  Atypical central chest pain:  Patient indicated that he had onset of chest pain while lifting boxes at his work place.  It was described as central chest pain, constant, nonradiating and pressure-like in pain.  This pain was not relieved with nitroglycerin, aspirin, Toradol or GI cocktail.  It appears oxycodone has been effective.  A 12-lead EKG showed sinus tachycardia without any ischemic changes.  Chest x-ray was normal.  Serial troponins remained negative.  Urine drug screen came back positive for cannabinoids.  The patient has a noted recent viral URI with symptoms including cough and Raynaud's which could relate to current presentation.  The patient was monitored on telemetry and had sinus tachycardia intermittently.  The patient received sublingual nitro without any relief; however, was utilizing as needed oxycodone for his pain.  Hemoglobin A1c came back at 5.7.  A nuclear medicine Lexiscan stress test of the heart was negative.  Upon discussion with the patient about his test results, he was inquiring how he  would manage his pain and when offered to utilize over-the-counter Tylenol and ibuprofen, patient indicated that he cannot use any of these, especially Tylenol due to concerns about his liver.  The patient was requesting oxycodone, however, patient was informed that his current pain would not require that level of analgesic management and as such patient will be following up with primary care provider for ongoing pain management.   2.  Hypertension:  Patient's blood pressures have been fairly elevated here.  It appears he is on amlodipine 5 mg daily at home and a baby aspirin.  Both of which were continued during this stay.  Both of these medications will be resumed at time of discharge and patient has an appointment scheduled for tomorrow morning at 10 a.m. at the [facility].   3.  Dyslipidemia:  Patient's prior to admit simvastatin 40 mg every evening at bedtime was held during this stay, it will be resumed at time of discharge.   4.  Chronic neck and low back pain:  Patient appears to be on scheduled gabapentin.  This was held during this stay, it will be resumed at time of discharge.  The patient, again, was requesting oxycodone and indicated he could not take Tylenol or ibuprofen and was requesting narcotics.  The patient was advised that his current chest discomfort did not require that level of pain management and patient will be following up tomorrow in primary clinic.   5.  Hypokalemia:  Patient had a noted potassium level of 3.3.  It was replaced per protocol and no further interventions were necessary.   6.  Tobacco abuse:  Patient currently smokes 2-3 cigarettes per day, has a 13-pack-year smoking history.  No replacement was given during this stay.  He will need to follow up with primary care provider for ongoing counseling.   7.  Major depressive disorder:  Patient does not appear to be on any medications at this point.  No further interventions appear necessary.   8.  Benign prostatic  hypertrophy:  Patient is currently on Flomax 0.4 mg every evening at bedtime per our electronic medical record, however, it is unsure whether or not patient has been taking this medication.  As we were unsure if he was taking this medication, it was not ordered during this stay and he can restart it at discharge.   9.  Latent TB:  Patient indicated that he completed a full course of therapy.  No interventions appear necessary at this point and chest x-ray was negative.   10.  History of cocaine abuse and ongoing cannabis use:  Urine drug came back positive for cannabinoids, no interventions were required during this stay.     Discharge Pain Plan:   During his hospitalization, Lilliam experienced pain due to chest pain thought to be musculoskeletal in nature and ruled out cardiac etiology with a negative stress test.  The pain plan for discharge was discussed with Lilliam and the plan was created in a collaborative fashion.    -Patient will discharge with continuation of scheduled gabapentin.  He was advised to utilize PRN tylenol and ibuprofen for which he stated he could not use these and had requested narcotics.  This was discussed further and patient will not be given a prescription for narcotics and will instead follow up at [facility] tomorrow morning at 10 am.       CODE STATUS:  FULL CODE.      The patient was discussed with Dr. David Calvert, who agrees with discharge at this time.      TOTAL DISCHARGE TIME:  Less than 30 minutes.         DAVID CALVERT MD       As dictated by MARCELINO ROACH PA-C            D: 04/10/2018   T: 04/10/2018   MT: EMIL      Name:     LILLIAM HOFFMAN   MRN:      [id number]        Account:        [id number]   :      1955           Admit Date:     2018                                  Discharge Date: 04/10/2018      Document: [id number]

## 2018-04-10 NOTE — PLAN OF CARE
Problem: Patient Care Overview  Goal: Plan of Care/Patient Progress Review  Outcome: Improving  List all goals to be met before discharge home:   - Serial troponins and stress test complete. - not met  - Seen and cleared by consultant if applicable - not met  - Adequate pain control on oral analgesia - partially met  - Vital signs normal or at patient baseline - met  - Safe disposition plan has been identified - not met  - Nurse to notify provider when observation goals have been met and patient is ready for discharge    A&O. VSS,RA. C/O pain mid sternal chest pain at the end of the shift , non radiating . Administered PRN Oxycodone with good relief. Trop 2/3 negative. IV SL. IND. Plan for Mayra scan. NPO. Tele Sinus tachy. Potassium replacement done. Recheck in am. Continue to monitor.

## 2018-04-10 NOTE — PHARMACY-ADMISSION MEDICATION HISTORY
Admission medication history interview status for the 4/9/2018  admission is complete. See EPIC admission navigator for prior to admission medications     Medication history source reliability:Poor    Actions taken by pharmacist (provider contacted, etc): Called [facility] pharmacy and [facility] retail pharmacy     Additional medication history information not noted on PTA med list :    When I spoke with the patient, the only meds on his existing med list were Norvasc and gabapentin from 2015, and aspirin and NTG.     He also said he was taking a cholesterol med and something for his prostate.    He said he hadn't taken anything for a few days because he ran out of pills.     I called [facility] pharmacy, which is where he said he fills most of his meds, and they said they had never filled anything for him.     I called [facility] and they said they hadn't filled anything for him in two years.    The doses / med info for the prostate and cholesterol med were obtained using [facility] [place]. Not sure how up to date this is.     Unclear if patient is taking any of these meds. Doses are unverified. Flagged simvastatin and Flomax for MD review, but Norvasc and gabapentin had already been acted on so those couldn't be flagged.     Medication reconciliation/reorder completed by provider prior to medication history? Yes    Time spent in this activity: 25 mins    Prior to Admission medications    Medication Sig Last Dose Taking? Auth Provider   Simvastatin (ZOCOR PO) Take 40 mg by mouth At Bedtime Unknown if he still takes this. he thinks he does, but I can't find a recent fill at a pharmacy. at - Yes Unknown, Entered By History   tamsulosin (FLOMAX) 0.4 MG capsule Take 0.4 mg by mouth At Bedtime Unknown if he still takes this. he thinks he does, but I can't find a recent fill at a pharmacy. at - Yes Unknown, Entered By History   albuterol (PROAIR HFA/PROVENTIL HFA/VENTOLIN HFA) 108 (90 BASE) MCG/ACT Inhaler Inhale 2  puffs into the lungs every 6 hours prn at prn Yes Unknown, Entered By History   aspirin EC 81 MG EC tablet Take 81 mg by mouth daily  4/7/2018 at am Yes Reported, Patient   amLODIPine (NORVASC) 5 MG tablet Take 1 tablet (5 mg) by mouth daily 4/7/2018 at am Yes Crispin Flynn MD   nitroglycerin (NITROSTAT) 0.4 MG SL tablet Place 1 tablet (0.4 mg) under the tongue every 5 minutes as needed for chest pain If you are still having symptoms after 3 doses (15 minutes) call 911. prn at prn  Paige Trevino MD   gabapentin (NEURONTIN) 300 MG capsule Take 1 capsule (300 mg) by mouth 3 times daily Unknown if he still takes this. he thinks he does, but I can't find a recent fill at a pharmacy. at -  Crispin Flynn MD

## 2018-04-10 NOTE — PROGRESS NOTES
Assessment complete for lexiscan stress test.  Reports mid left chest pain at 4-5/10.  Reports some relief from OxyContin given this am.  States has not been pain free since arriving to hospital.  Appears comfortable prior to stress test.  IV patent, lungs diminished without any wheezing.  Will plan to ambulate on treadmill.

## 2018-04-10 NOTE — PLAN OF CARE
Problem: Patient Care Overview  Goal: Plan of Care/Patient Progress Review  Outcome: Improving  List all goals to be met before discharge home:   - Serial troponins and stress test complete. - not met  - Seen and cleared by consultant if applicable - not met  - Adequate pain control on oral analgesia - met  - Vital signs normal or at patient baseline - not met  - Safe disposition plan has been identified - not met  - Nurse to notify provider when observation goals have been met and patient is ready for discharge

## 2018-04-10 NOTE — PROVIDER NOTIFICATION
MD Notification    Notified Person: PA    Notified Person Name: Neil Youssef    Notification Date/Time: 4-10-18    Notification Interaction: Web     Purpose of Notification: patient with continued complaints of chest pain.  CP = 6-7/10.  To clarify whether to administer nitro or oxycodone for pain relief.      Orders Received: None at this time of note.     Comments:

## 2018-05-19 ENCOUNTER — HOSPITAL ENCOUNTER (EMERGENCY)
Facility: CLINIC | Age: 63
Discharge: HOME | End: 2018-05-19
Attending: EMERGENCY MEDICINE | Admitting: EMERGENCY MEDICINE
Payer: COMMERCIAL

## 2018-05-19 ENCOUNTER — APPOINTMENT (OUTPATIENT)
Dept: GENERAL RADIOLOGY | Facility: CLINIC | Age: 63
End: 2018-05-19
Attending: EMERGENCY MEDICINE
Payer: COMMERCIAL

## 2018-05-19 ENCOUNTER — APPOINTMENT (OUTPATIENT)
Dept: CT IMAGING | Facility: CLINIC | Age: 63
End: 2018-05-19
Attending: EMERGENCY MEDICINE
Payer: COMMERCIAL

## 2018-05-19 VITALS
SYSTOLIC BLOOD PRESSURE: 157 MMHG | HEIGHT: 68 IN | DIASTOLIC BLOOD PRESSURE: 95 MMHG | RESPIRATION RATE: 16 BRPM | HEART RATE: 87 BPM | OXYGEN SATURATION: 99 % | BODY MASS INDEX: 27.28 KG/M2 | WEIGHT: 180 LBS | TEMPERATURE: 97.9 F

## 2018-05-19 DIAGNOSIS — R05.9 COUGH: ICD-10-CM

## 2018-05-19 DIAGNOSIS — S00.12XA CONTUSION OF LEFT PERIOCULAR REGION, INITIAL ENCOUNTER: ICD-10-CM

## 2018-05-19 PROCEDURE — 90471 IMMUNIZATION ADMIN: CPT | Performed by: EMERGENCY MEDICINE

## 2018-05-19 PROCEDURE — 71046 X-RAY EXAM CHEST 2 VIEWS: CPT

## 2018-05-19 PROCEDURE — 99284 EMERGENCY DEPT VISIT MOD MDM: CPT | Mod: 25 | Performed by: EMERGENCY MEDICINE

## 2018-05-19 PROCEDURE — 25000128 H RX IP 250 OP 636: Performed by: EMERGENCY MEDICINE

## 2018-05-19 PROCEDURE — 70450 CT HEAD/BRAIN W/O DYE: CPT

## 2018-05-19 PROCEDURE — 90715 TDAP VACCINE 7 YRS/> IM: CPT | Performed by: EMERGENCY MEDICINE

## 2018-05-19 PROCEDURE — 99284 EMERGENCY DEPT VISIT MOD MDM: CPT | Mod: Z6 | Performed by: EMERGENCY MEDICINE

## 2018-05-19 RX ORDER — BENZONATATE 100 MG/1
100 CAPSULE ORAL 3 TIMES DAILY PRN
Qty: 15 CAPSULE | Refills: 0 | Status: SHIPPED | OUTPATIENT
Start: 2018-05-19 | End: 2018-07-19

## 2018-05-19 RX ADMIN — CLOSTRIDIUM TETANI TOXOID ANTIGEN (FORMALDEHYDE INACTIVATED), CORYNEBACTERIUM DIPHTHERIAE TOXOID ANTIGEN (FORMALDEHYDE INACTIVATED), BORDETELLA PERTUSSIS TOXOID ANTIGEN (GLUTARALDEHYDE INACTIVATED), BORDETELLA PERTUSSIS FILAMENTOUS HEMAGGLUTININ ANTIGEN (FORMALDEHYDE INACTIVATED), BORDETELLA PERTUSSIS PERTACTIN ANTIGEN, AND BORDETELLA PERTUSSIS FIMBRIAE 2/3 ANTIGEN 0.5 ML: 5; 2; 2.5; 5; 3; 5 INJECTION, SUSPENSION INTRAMUSCULAR at 10:33

## 2018-05-19 ASSESSMENT — ENCOUNTER SYMPTOMS
SPEECH DIFFICULTY: 0
CONFUSION: 0
NECK STIFFNESS: 0
WEAKNESS: 0
ABDOMINAL PAIN: 0
NUMBNESS: 0
DIARRHEA: 0
FEVER: 0
COLOR CHANGE: 0
NECK PAIN: 0
SHORTNESS OF BREATH: 0
VOMITING: 0
BACK PAIN: 0
EYE REDNESS: 0
DIFFICULTY URINATING: 0
ARTHRALGIAS: 0
COUGH: 1
HEADACHES: 1

## 2018-05-19 NOTE — ED PROVIDER NOTES
History     Chief Complaint   Patient presents with     Headache     HPI  Abhilash Gonzalez is a 62 year old male with a history of hypertension, hyperlipidemia, latent pulmonary tuberculosis, degenerative disc disease, and history of cocaine use disorder (in remission), who presents to the emergency department today for evaluation of headache status post fall, as well as cough.  The patient reports that last night, at approximately 11 PM, he was crossing the street when he inadvertently tripped over the curb and fell forward.  The patient believes that he did lose consciousness for an unspecified time and states that he did hit his head, with evident abrasion overlying the left eyebrow.  The patient denies any precipitating symptoms to the fall, such as chest pain, shortness of breath, or lightheadedness.  The patient states that since the fall he has had headaches, which wax and wane in intensity.  No visual disturbance, numbness, or weakness.  He denies any other injury from this fall, in particular denying any new neck pain, back pain, or joint pain.    In addition, the patient reports that he has had ongoing cough for the past month.  Cough is occasional productive, and he reports associated intermittent feverishness.  He states that while his cough has not actually gotten worse over the past month, it has not gotten better.  He states that he was seen at [facility] for this cough approximately 3 weeks ago and at that time he was given an antibiotic and coughs syrup, with limited effect on his cough.  The patient denies sore throat.  He does report that he smokes cigarettes.  The patient takes aspirin 81 mg daily and is on no other blood-thinning medication.    No current facility-administered medications for this encounter.      Current Outpatient Prescriptions   Medication     benzonatate (TESSALON PERLES) 100 MG capsule     albuterol (PROAIR HFA/PROVENTIL HFA/VENTOLIN HFA) 108 (90 BASE) MCG/ACT Inhaler      amLODIPine (NORVASC) 5 MG tablet     aspirin EC 81 MG EC tablet     gabapentin (NEURONTIN) 300 MG capsule     nitroglycerin (NITROSTAT) 0.4 MG SL tablet     Simvastatin (ZOCOR PO)     tamsulosin (FLOMAX) 0.4 MG capsule     Past Medical History:   Diagnosis Date     Back pain      Depression      DJD (degenerative joint disease)      Polysubstance abuse      TB lung, latent        Past Surgical History:   Procedure Laterality Date     BRONCHOSCOPY       COLONOSCOPY  12-5-12    Repeat Colonoscopy in 5 yrs.     HAND SURGERY      right palm     MANDIBLE SURGERY  10/2004     XR KNEE SURGERY JUANCHO RIGHT         Family History   Problem Relation Age of Onset     Hypertension Mother      CEREBROVASCULAR DISEASE Brother        Social History   Substance Use Topics     Smoking status: Current Some Day Smoker     Packs/day: 0.30     Years: 15.00     Types: Cigarettes     Smokeless tobacco: Never Used     Alcohol use 0.0 oz/week     0 Standard drinks or equivalent per week     Allergies   Allergen Reactions     Vicodin [Hydrocodone-Acetaminophen] Itching       I have reviewed the Medications, Allergies, Past Medical and Surgical History, and Social History in the Epic system.    Review of Systems   Constitutional: Negative for fever.   HENT: Negative for congestion.    Eyes: Negative for redness and visual disturbance.   Respiratory: Positive for cough. Negative for shortness of breath.    Cardiovascular: Negative for chest pain.   Gastrointestinal: Negative for abdominal pain, diarrhea and vomiting.   Genitourinary: Negative for difficulty urinating.   Musculoskeletal: Negative for arthralgias, back pain, neck pain and neck stiffness.   Skin: Negative for color change.   Neurological: Positive for headaches. Negative for speech difficulty, weakness and numbness.   Psychiatric/Behavioral: Negative for confusion.   All other systems reviewed and are negative.      Physical Exam   BP: (!) 157/95  Pulse: 105  Temp: 97.9  F (36.6  " C)  Resp: 16  Height: 172.7 cm (5' 8\")  Weight: 81.6 kg (180 lb)  SpO2: 96 %      Physical Exam   Constitutional: No distress.   HENT:   Head: Head is with abrasion.       Mouth/Throat: Oropharynx is clear and moist. No oropharyngeal exudate.   Eyes: Conjunctivae and EOM are normal. Pupils are equal, round, and reactive to light. No scleral icterus.   Cardiovascular: Normal heart sounds and intact distal pulses.    Pulmonary/Chest: Breath sounds normal. No respiratory distress. He has no decreased breath sounds. He has no wheezes. He has no rhonchi. He has no rales.   Abdominal: Soft. Bowel sounds are normal. There is no tenderness.   Musculoskeletal: He exhibits no edema or tenderness.   Neurological: He has normal strength. No cranial nerve deficit or sensory deficit. GCS eye subscore is 4. GCS verbal subscore is 5. GCS motor subscore is 6.   Skin: Skin is warm. No rash noted. He is not diaphoretic.       ED Course     ED Course     Procedures             Critical Care time:  none             Labs Ordered and Resulted from Time of ED Arrival Up to the Time of Departure from the ED - No data to display         Assessments & Plan (with Medical Decision Making)   The patient has a head contusion with loss of consciousness.  He takes aspirin but no other blood thinners.  Given his age and intermittent headaches since then he was sent for CT which appears normal with no signs of bleeding or mass-effect.  There is no sign of skull fracture.  Patient does not appear to have any other injury from the fall.  He does have an ongoing cough for a month but no focal pneumonia on chest x-ray.  He is not hypoxic and in no respiratory distress here.  He was already given a course of antibiotics at [facility] and will be treated symptomatically with Tessalon and instructed to minimize his tobacco exposure and follow-up with his doctor if not improving.    I have reviewed the nursing notes.    I have reviewed the findings, " diagnosis, plan and need for follow up with the patient.    Discharge Medication List as of 5/19/2018 11:53 AM      START taking these medications    Details   benzonatate (TESSALON PERLES) 100 MG capsule Take 1 capsule (100 mg) by mouth 3 times daily as needed for cough, Disp-15 capsule, R-0, Local Print             Final diagnoses:   Contusion of left periocular region, initial encounter   Cough     IGio, am serving as a trained medical scribe to document services personally performed by Rodriguez Ledbetter MD, based on the provider's statements to me.      IRodriguez MD, was physically present and have reviewed and verified the accuracy of this note documented by Gio Moss.    5/19/2018   [facility], [place], EMERGENCY DEPARTMENT     Rodriguez Ledbetter MD  05/19/18 [id number]

## 2018-05-19 NOTE — DISCHARGE INSTRUCTIONS
Please make an appointment to follow up with Your Primary Care Provider as soon as possible if not improving.          *VIRAL RESPIRATORY ILLNESS w/ Wheezing [Adult]  You have an Upper Respiratory Illness (URI) caused by a virus. This illness is contagious during the first few days. It is spread through the air by coughing and sneezing or by direct contact (touching the sick person and then touching your own eyes, nose or mouth). When the infection causes a lot of irritation, the air passages can go into spasm. This causes wheezing and shortness of breath.    Most viral illnesses resolve within 7-10 days with rest and simple home remedies, although the illness may sometimes last for several weeks. Antibiotics will not kill a virus and are generally not prescribed for this condition.  HOME CARE:  1) If symptoms are severe, rest at home for the first 2-3 days. When resuming activity, don't let yourself become overly tired.  2) Avoid exposure to cigarette smoke (yours or others).  3) You may use acetaminophen (Tylenol) 650-1000 mg every 6 hours or ibuprofen (Motrin, Advil) 600 mg every 6-8 hours with food to control pain, if you are able to take these medicines. [ NOTE : If you have chronic liver or kidney disease or ever had a stomach ulcer or GI bleeding, talk with your doctor before using these medicines.] (Aspirin should never be used in anyone under 18 years of age who is ill with a fever. It may cause severe liver damage.)  4) Your appetite may be poor so a light diet is fine. Avoid dehydration by drinking 6-8 glasses of fluids per day (water, soft, drinks, juices, tea, soup, etc.). Extra fluids will help loosen secretions in the nose and lungs.  5) Over-the-counter cold medicines will not shorten the duration of the illness, but may be helpful for the following symptoms: cough (Robitussin DM); sore throat (Chloraseptic lozenges or spray); nasal and sinus congestion (Actifed or Sudafed). [NOTE: Do not use  decongestants if you have high blood pressure.]  FOLLOW UP with your doctor or as advised if you are not improving over the next week.  GET PROMPT MEDICAL ATTENTION if any of the following occur:  -- Cough with lots of colored sputum (mucus) or blood in your sputum  -- Chest pain, shortness of breath, wheezing or difficulty breathing  -- Severe headache; face, neck or ear pain  -- Fever over 101 F (38.3 C) for more than three days  -- Unable to swallow due to throat pain    5491-4759 [employer], [address] [place] [address], [place] [address]. All rights reserved. This information is not intended as a substitute for professional medical care. Always follow your healthcare professional's instructions.

## 2018-05-19 NOTE — ED TRIAGE NOTES
Arrived to ED d/t c/o head flu, has been having cough for the past month with yellow and clear phlegm, also notes that he fell last night after being dizzy, hit left side of head above eyebrow on cement, denies any LOC, headache has worsened since hitting head, VSS

## 2018-05-19 NOTE — ED AVS SNAPSHOT
[facility], Emergency Department    [address] [facility] [address]    Phone:  [phone]                                       Abhilash Gonzalez   MRN: [id number]    Department:  [facility], Emergency Department   Date of Visit:  5/19/2018           Patient Information     Date Of Birth          1955        Your diagnoses for this visit were:     Contusion of left periocular region, initial encounter     Cough        You were seen by Rodriguez Ledbetter MD.        Discharge Instructions       Please make an appointment to follow up with Your Primary Care Provider as soon as possible if not improving.          *VIRAL RESPIRATORY ILLNESS w/ Wheezing [Adult]  You have an Upper Respiratory Illness (URI) caused by a virus. This illness is contagious during the first few days. It is spread through the air by coughing and sneezing or by direct contact (touching the sick person and then touching your own eyes, nose or mouth). When the infection causes a lot of irritation, the air passages can go into spasm. This causes wheezing and shortness of breath.    Most viral illnesses resolve within 7-10 days with rest and simple home remedies, although the illness may sometimes last for several weeks. Antibiotics will not kill a virus and are generally not prescribed for this condition.  HOME CARE:  1) If symptoms are severe, rest at home for the first 2-3 days. When resuming activity, don't let yourself become overly tired.  2) Avoid exposure to cigarette smoke (yours or others).  3) You may use acetaminophen (Tylenol) 650-1000 mg every 6 hours or ibuprofen (Motrin, Advil) 600 mg every 6-8 hours with food to control pain, if you are able to take these medicines. [ NOTE : If you have chronic liver or kidney disease or ever had a stomach ulcer or GI bleeding, talk with your doctor before using these medicines.] (Aspirin should never be used in anyone under 18 years of age who is ill with a fever. It may  cause severe liver damage.)  4) Your appetite may be poor so a light diet is fine. Avoid dehydration by drinking 6-8 glasses of fluids per day (water, soft, drinks, juices, tea, soup, etc.). Extra fluids will help loosen secretions in the nose and lungs.  5) Over-the-counter cold medicines will not shorten the duration of the illness, but may be helpful for the following symptoms: cough (Robitussin DM); sore throat (Chloraseptic lozenges or spray); nasal and sinus congestion (Actifed or Sudafed). [NOTE: Do not use decongestants if you have high blood pressure.]  FOLLOW UP with your doctor or as advised if you are not improving over the next week.  GET PROMPT MEDICAL ATTENTION if any of the following occur:  -- Cough with lots of colored sputum (mucus) or blood in your sputum  -- Chest pain, shortness of breath, wheezing or difficulty breathing  -- Severe headache; face, neck or ear pain  -- Fever over 101 F (38.3 C) for more than three days  -- Unable to swallow due to throat pain    2060-5917 [employer], [address] [place] [address]. All rights reserved. This information is not intended as a substitute for professional medical care. Always follow your healthcare professional's instructions.        24 Hour Appointment Hotline       To make an appointment at any [facility], call [phone] ([phone]). If you don't have a family doctor or clinic, we will help you find one. [facility] are conveniently located to serve the needs of you and your family.             Review of your medicines      START taking        Dose / Directions Last dose taken    benzonatate 100 MG capsule   Commonly known as:  TESSALON PERLES   Dose:  100 mg   Quantity:  15 capsule        Take 1 capsule (100 mg) by mouth 3 times daily as needed for cough   Refills:  0          Our records show that you are taking the medicines listed below. If these are incorrect, please call your family doctor or clinic.         Dose / Directions Last dose taken    albuterol 108 (90 Base) MCG/ACT Inhaler   Commonly known as:  PROAIR HFA/PROVENTIL HFA/VENTOLIN HFA   Dose:  2 puff        Inhale 2 puffs into the lungs every 6 hours   Refills:  0        amLODIPine 5 MG tablet   Commonly known as:  NORVASC   Dose:  5 mg   Quantity:  30 tablet        Take 1 tablet (5 mg) by mouth daily   Refills:  3        aspirin 81 MG EC tablet   Dose:  81 mg        Take 81 mg by mouth daily   Refills:  0        FLOMAX 0.4 MG capsule   Dose:  0.4 mg   Generic drug:  tamsulosin        Take 0.4 mg by mouth At Bedtime   Refills:  0        gabapentin 300 MG capsule   Commonly known as:  NEURONTIN   Dose:  300 mg   Quantity:  90 capsule        Take 1 capsule (300 mg) by mouth 3 times daily   Refills:  3        nitroGLYcerin 0.4 MG sublingual tablet   Commonly known as:  NITROSTAT   Dose:  0.4 mg   Quantity:  25 tablet        Place 1 tablet (0.4 mg) under the tongue every 5 minutes as needed for chest pain If you are still having symptoms after 3 doses (15 minutes) call 911.   Refills:  0        ZOCOR PO   Dose:  40 mg        Take 40 mg by mouth At Bedtime   Refills:  0                Prescriptions were sent or printed at these locations (1 Prescription)                   Other Prescriptions                Printed at Department/Unit printer (1 of 1)         benzonatate (TESSALON PERLES) 100 MG capsule                Procedures and tests performed during your visit     CT Head w/o Contrast    XR Chest 2 Views      Orders Needing Specimen Collection     None      Pending Results     Date and Time Order Name Status Description    5/19/2018 1003 CT Head w/o Contrast Preliminary             Pending Culture Results     No orders found from 5/17/2018 to 5/20/2018.            Pending Results Instructions     If you had any lab results that were not finalized at the time of your Discharge, you can call the ED Lab Result RN at [phone]. You will be contacted by this  "team for any positive Lab results or changes in treatment. The nurses are available 7 days a week from 10A to 6:30P.  You can leave a message 24 hours per day and they will return your call.        Thank you for choosing [facility]       Thank you for choosing [facility] for your care. Our goal is always to provide you with excellent care. Hearing back from our patients is one way we can continue to improve our services. Please take a few minutes to complete the written survey that you may receive in the mail after you visit with us. Thank you!        [employer] Information     [employer] lets you send messages to your doctor, view your test results, renew your prescriptions, schedule appointments and more. To sign up, go to [email or web address] . Click on \"Log in\" on the left side of the screen, which will take you to the Welcome page. Then click on \"Sign up Now\" on the right side of the page.     You will be asked to enter the access code listed below, as well as some personal information. Please follow the directions to create your username and password.     Your access code is: [email or web address]  Expires: 2018  1:44 PM     Your access code will  in 90 days. If you need help or a new code, please call your [facility] clinic or [phone].        Care EveryWhere ID     This is your Care EveryWhere ID. This could be used by other organizations to access your [facility] medical records  [id number]        Equal Access to Services     JASE KUMAR : Hadii corie hernandezo Soaida, waaxda luqadaha, qaybta kaalmada adejasperyada, hyacinth gusman . So [facility] [phone].    ATENCIÓN: Si habla español, tiene a sahu disposición servicios gratuitos de asistencia lingüística. Llame al [phone].    We comply with applicable federal civil rights laws and [place] laws. We do not discriminate on the basis of race, color, national origin, age, disability, sex, sexual orientation, or gender identity.          "   After Visit Summary       This is your record. Keep this with you and show to your community pharmacist(s) and doctor(s) at your next visit.

## 2018-05-19 NOTE — ED AVS SNAPSHOT
[facility] [place], Emergency Department    [address] [place] [address]    Phone:  [phone]                                       Abhilash Gonzalez   MRN: [id number]    Department:  [facility], Emergency Department   Date of Visit:  5/19/2018           After Visit Summary Signature Page     I have received my discharge instructions, and my questions have been answered. I have discussed any challenges I see with this plan with the nurse or doctor.    ..........................................................................................................................................  Patient/Patient Representative Signature      ..........................................................................................................................................  Patient Representative Print Name and Relationship to Patient    ..................................................               ................................................  Date                                            Time    ..........................................................................................................................................  Reviewed by Signature/Title    ...................................................              ..............................................  Date                                                            Time

## 2018-05-23 ENCOUNTER — TELEPHONE (OUTPATIENT)
Dept: CALL CENTER | Age: 63
End: 2018-05-23

## 2018-05-23 NOTE — TELEPHONE ENCOUNTER
"[facility]: Nurse Triage Note  SITUATION/BACKGROUND                                                      Abhilash Gonzalez is a 62 year old male who calls with initial c/o of chest pain and shortness of breath. Seen in ED here after a fall on 5/19/2018.  Today he was transferred to [facility] triage because of initial complaint of chest pain/ SOB and right arm pain.  We began to go through protocol for chest pain and he stated- these are all old symptoms and not acute\" I've been dealing with these pains for months, years.\"   I reviewed that we needed to review symptoms of this nature via triage according to protocols. He states understanding and that his call was not an emergency.He is impatient with queries.   He states he just wants an appointment ( had called through Ortho line). \"Get me someone who can make an appointment\".  Writer explained that we wished to help him, and asked about primary care- he states he hasn't one here but would like to make appt.  Unfortunately, when spoke with [facility] line, Abhilash has [employer] and we do not take this insurance. I notified him that he should make appt through [employer] and he hung up the line.       Allergies:   Allergies   Allergen Reactions     Vicodin [Hydrocodone-Acetaminophen] Itching       ASSESSMENT       Seeking PCC, cannot be seen at [facility]/ PCC due to [facility] coverage    RECOMMENDATION/PLAN                                                      RECOMMENDED DISPOSITION:  make appointment at [facility] Member Services at [phone]   Compliance; No- Barriers to comply with plan of care hung up on writer..information given however.    If further questions/concerns or if symptoms do not improve, worsen or new symptoms develop, call your PCP or [phone] to talk with the Resident on call, as soon as possible.    Guideline used: pp118. Chest pain  Telephone Triage Protocols for Nurses, Fifth Edition, Bárbara WAN " Velvet RN

## 2018-06-18 ENCOUNTER — HOSPITAL ENCOUNTER (EMERGENCY)
Facility: CLINIC | Age: 63
Discharge: HOME | End: 2018-06-18
Attending: EMERGENCY MEDICINE | Admitting: EMERGENCY MEDICINE
Payer: COMMERCIAL

## 2018-06-18 ENCOUNTER — APPOINTMENT (OUTPATIENT)
Dept: GENERAL RADIOLOGY | Facility: CLINIC | Age: 63
End: 2018-06-18
Attending: EMERGENCY MEDICINE
Payer: COMMERCIAL

## 2018-06-18 VITALS
SYSTOLIC BLOOD PRESSURE: 163 MMHG | OXYGEN SATURATION: 97 % | HEIGHT: 68 IN | RESPIRATION RATE: 20 BRPM | WEIGHT: 180 LBS | DIASTOLIC BLOOD PRESSURE: 100 MMHG | TEMPERATURE: 96.9 F | BODY MASS INDEX: 27.28 KG/M2

## 2018-06-18 DIAGNOSIS — J98.01 BRONCHOSPASM: ICD-10-CM

## 2018-06-18 DIAGNOSIS — J20.9 ACUTE BRONCHITIS, UNSPECIFIED ORGANISM: ICD-10-CM

## 2018-06-18 PROCEDURE — 71046 X-RAY EXAM CHEST 2 VIEWS: CPT

## 2018-06-18 PROCEDURE — 25000125 ZZHC RX 250: Performed by: EMERGENCY MEDICINE

## 2018-06-18 PROCEDURE — 99283 EMERGENCY DEPT VISIT LOW MDM: CPT | Mod: 25

## 2018-06-18 PROCEDURE — 25000132 ZZH RX MED GY IP 250 OP 250 PS 637: Performed by: EMERGENCY MEDICINE

## 2018-06-18 PROCEDURE — 94640 AIRWAY INHALATION TREATMENT: CPT

## 2018-06-18 RX ORDER — IPRATROPIUM BROMIDE AND ALBUTEROL SULFATE 2.5; .5 MG/3ML; MG/3ML
3 SOLUTION RESPIRATORY (INHALATION) ONCE
Status: COMPLETED | OUTPATIENT
Start: 2018-06-18 | End: 2018-06-18

## 2018-06-18 RX ORDER — IBUPROFEN 600 MG/1
600 TABLET, FILM COATED ORAL ONCE
Status: COMPLETED | OUTPATIENT
Start: 2018-06-18 | End: 2018-06-18

## 2018-06-18 RX ORDER — PREDNISONE 20 MG/1
40 TABLET ORAL ONCE
Status: COMPLETED | OUTPATIENT
Start: 2018-06-18 | End: 2018-06-18

## 2018-06-18 RX ORDER — PREDNISONE 20 MG/1
TABLET ORAL
Qty: 10 TABLET | Refills: 0 | Status: SHIPPED | OUTPATIENT
Start: 2018-06-18 | End: 2018-07-19

## 2018-06-18 RX ORDER — GUAIFENESIN/DEXTROMETHORPHAN 100-10MG/5
10 SYRUP ORAL 4 TIMES DAILY PRN
Qty: 560 ML | Refills: 0 | Status: SHIPPED | OUTPATIENT
Start: 2018-06-18 | End: 2018-07-10

## 2018-06-18 RX ORDER — AZITHROMYCIN 250 MG/1
TABLET, FILM COATED ORAL
Qty: 6 TABLET | Refills: 0 | Status: SHIPPED | OUTPATIENT
Start: 2018-06-18 | End: 2018-06-23

## 2018-06-18 RX ORDER — ALBUTEROL SULFATE 90 UG/1
2 AEROSOL, METERED RESPIRATORY (INHALATION) EVERY 4 HOURS PRN
Qty: 1 INHALER | Refills: 0 | Status: SHIPPED | OUTPATIENT
Start: 2018-06-18 | End: 2019-10-29

## 2018-06-18 RX ADMIN — IBUPROFEN 600 MG: 600 TABLET ORAL at 03:02

## 2018-06-18 RX ADMIN — IPRATROPIUM BROMIDE AND ALBUTEROL SULFATE 3 ML: .5; 3 SOLUTION RESPIRATORY (INHALATION) at 03:03

## 2018-06-18 RX ADMIN — PREDNISONE 40 MG: 20 TABLET ORAL at 04:56

## 2018-06-18 ASSESSMENT — ENCOUNTER SYMPTOMS
SHORTNESS OF BREATH: 0
COUGH: 1

## 2018-06-18 NOTE — ED PROVIDER NOTES
History     Chief Complaint:  Cough     HPI   Abhilash Gonzalez is a 62 year old male with a remote history of fully treated TB who presents to the emergency department for evaluation of a cough. The patient states that for the past 2 months he has had a semi productive cough. He notes he has the most trouble with his cough in the morning. This cough has not improved or worsened since its time of onset. He has been given allergy medication and bronchodilators with little improvement in the past. The patient admits to having some occasional trouble with acid reflux. He denies any shortness of breath. He is a smoker. He denies a COPD diagnosis. He denies chest pain, fevers, shortness of breath.    The patient also has right knee pain and swelling that he states is due to a previous injury sustained in basketball.     Allergies:  Vicodin [Hydrocodone-Acetaminophen]     Medications:    albuterol (PROAIR HFA/PROVENTIL HFA/VENTOLIN HFA) 108 (90 Base) MCG/ACT Inhaler  azithromycin (ZITHROMAX Z-MARKY) 250 MG tablet  guaiFENesin-dextromethorphan (ROBITUSSIN DM) 100-10 MG/5ML syrup  predniSONE (DELTASONE) 20 MG tablet  amLODIPine (NORVASC) 5 MG tablet  aspirin EC 81 MG EC tablet  benzonatate (TESSALON PERLES) 100 MG capsule  gabapentin (NEURONTIN) 300 MG capsule  nitroglycerin (NITROSTAT) 0.4 MG SL tablet  Simvastatin (ZOCOR PO)  tamsulosin (FLOMAX) 0.4 MG capsule     Past Medical History:    Back pain  Depression   DJD  Hypertension  Polysubstance abuse  TB      Past Surgical History:    Bronchoscopy  Right palm  Mandible surgery  Right knee surgery    Family History:    Hypertension  CV disease     Social History:  Marital Status:  Single [1]  Social History   Substance Use Topics     Smoking status: Current Some Day Smoker     Packs/day: 0.30     Years: 15.00     Types: Cigarettes     Smokeless tobacco: Never Used     Alcohol use 0.0 oz/week     0 Standard drinks or equivalent per week        Review of Systems   HENT:  "Positive for congestion.    Respiratory: Positive for cough. Negative for shortness of breath.    All other systems reviewed and are negative.    Physical Exam   First Vitals:  BP: (!) 182/105  Heart Rate: 86  Temp: 96.9  F (36.1  C)  Resp: 18  Height: 172.7 cm (5' 8\")  Weight: 81.6 kg (180 lb)  SpO2: 97 %      Physical Exam  Constitutional:  Appears well-developed and well-nourished. Cooperative.   HENT:   Head:    Atraumatic.   Mouth/Throat:   Oropharynx is without erythema or exudate and mucous     membranes are moist.   Eyes:    Conjunctivae normal and EOM are normal.      Pupils are equal, round, and reactive to light.   Neck:    Normal range of motion. Neck supple.   Cardiovascular:  Normal rate, regular rhythm, normal heart sounds and radial and    dorsalis pedis pulses are 2+ and symmetric.    Pulmonary/Chest:  Decline in breath sounds bilaterally. Clear lung. Coughs with deep inhalation  Abdominal:   Soft. Bowel sounds are normal.      No splenomegaly or hepatomegaly. No tenderness. No rebound.   Musculoskeletal:  Normal range of motion. No edema and no tenderness. Mild swelling of right knee. No erythema. No bony tenderness. Normal range of motion. No ligamentous instability  Neurological:  Alert. Normal strength. No cranial nerve deficit. GCS 15.  Skin:    Skin is warm and dry.   Psychiatric:   Normal mood and affect.       Emergency Department Course   Imaging:  Radiology findings were communicated with the patient who voiced understanding of the findings.    Chest XR,  PA & LAT  Preliminary Result  IMPRESSION: No acute abnormality.  Readings are preliminary at time of note     Interventions:  0303 Ipratropium 3 mls Neb  0302 ibuprofen 600 mg PO  0456 deltasone 40 mg PO    Emergency Department Course:  Nursing notes and vitals reviewed.  I performed an exam of the patient as documented above.   I discussed the treatment plan with the patient. They expressed understanding of this plan and consented to " discharge. They will be discharged home with instructions for care and follow up. In addition, the patient will return to the emergency department if their symptoms persist, worsen, if new symptoms arise or if there is any concern.  All questions were answered.    I personally reviewed the imaging results with the patient and answered all related questions prior to discharge.  Impression & Plan      Medical Decision Making:  Abhilash Gonzalez is a 62 year old male who presents for evaluation of cough that has been present for the last 2 months, unresponsive to bronchodilators and allergy medications.  This is consistent with an upper respiratory tract infection/bronchitis.  There is no sign at this point of serious bacterial infection such as OM, RPA, epiglottitis, PTA, strep pharyngitis, pneumonia, sinusitis, meningitis, bacteremia.  Given no fever, no hypoxia and no respiratory distress and negative CXR the probability of bacterial pneumonia is very unlikely, however he has had 2 months of symptoms and is a smoker.  He describes a productive cough.  We will treat him with azithromycin.   There are no gastrointestinal symptoms at this point and no signs of dehydration.  Close followup with primary care physician is indicated.  Symptoms of coughing improved following nebulizer treatment.  I suspect some degree of bronchospasm.  He is prescribed an albuterol inhaler and a burst of prednisone.  He is noted to have elevated blood pressure in the ED.  Advised him to follow-up with his primary care provider regarding this.  He does not have any symptoms of hypertension tonight.  Return to ED for fever > 103, protracted vomiting, confusion.      Diagnosis:    ICD-10-CM    1. Acute bronchitis, unspecified organism J20.9    2. Bronchospasm J98.01      Disposition:   Discharged    Discharge Medications:  Discharge Medication List as of 6/18/2018  4:53 AM      START taking these medications    Details   azithromycin (ZITHROMAX  Z-MARKY) 250 MG tablet Two tablets on the first day, then one tablet daily for the next 4 days, Disp-6 tablet, R-0, [employer]      guaiFENesin-dextromethorphan (ROBITUSSIN DM) 100-10 MG/5ML syrup Take 10 mLs by mouth 4 times daily as needed for cough, Disp-560 mL, R-0, [employer]      predniSONE (DELTASONE) 20 MG tablet Take two tablets (= 40mg) each day for 5 (five) days, Disp-10 tablet, R-0, [employer]             Scribe Disclosure:  AAYUSH, Matti Mensah, am serving as a scribe at 2:58 AM on 6/18/2018 to document services personally performed by Angel Barrera MD, based on my observations and the provider's statements to me.       EMERGENCY DEPARTMENT       Angel Barrera MD  06/23/18 5125

## 2018-06-18 NOTE — ED AVS SNAPSHOT
Emergency Department    [address] [facility] [address]    Phone:  [phone]    Fax:  [phone]                                       Abhilash Gonzalez   MRN: [id number]    Department:   Emergency Department   Date of Visit:  6/18/2018           Patient Information     Date Of Birth          1955        Your diagnoses for this visit were:     Acute bronchitis, unspecified organism     Bronchospasm        You were seen by Angel Barrera MD.      Follow-up Information     Follow up with Leighton Montgomery MD. Schedule an appointment as soon as possible for a visit in 1 week.    Specialty:  Internal Medicine    Contact information:    [address] [facility] [place]  [facility] [address]  [phone]          Follow up with  Emergency Department.    Specialty:  EMERGENCY MEDICINE    Why:  If symptoms worsen    Contact information:    [address] [facility] [id number] [phone]        Discharge Instructions         Viral or Bacterial Bronchitis with Wheezing (Adult)    Bronchitis is an infection of the air passages. It often occurs during a cold and is usually caused by a virus. Symptoms include cough with mucus (phlegm) and low-grade fever. This illness is contagious during the first few days and is spread through the air by coughing and sneezing, or by direct contact (touching the sick person and then touching your own eyes, nose, or mouth).  If there is a lot of inflammation, air flow is restricted. The air passages may also go into spasm, especially if you have asthma. This causes wheezing and difficulty breathing even in people who do not have asthma.  Bronchitis usually lasts 7 to 14 days. The wheezing should improve with treatment during the first week. An inhaler is often prescribed to relax the air passages and stop wheezing. Antibiotics will be prescribed if your doctor thinks there is also a secondary bacterial infection.  Home care    If symptoms are severe,  rest at home for the first 2 to 3 days. When you go back to your usual activities, don't let yourself get too tired.    Do not smoke. Also avoid being exposed to secondhand smoke.    You may use over-the-counter medicine to control fever or pain, unless another medicine was prescribed. Note: If you have chronic liver or kidney disease or have ever had a stomach ulcer or gastrointestinal bleeding, talk with your healthcare provider before using these medicines. Also talk to your provider if you are taking medicine to prevent blood clots.) Aspirin should never be given to anyone younger than 18 years of age who is ill with a viral infection or fever. It may cause severe liver or brain damage.    Your appetite may be poor, so a light diet is fine. Avoid dehydration by drinking 6 to 8 glasses of fluids per day (such as water, soft drinks, sports drinks, juices, tea, or soup). Extra fluids will help loosen secretions in the nose and lungs.    Over-the-counter cough, cold, and sore-throat medicines will not shorten the length of the illness, but they may be helpful to reduce symptoms. (Note: Do not use decongestants if you have high blood pressure.)    If you were given an inhaler, use it exactly as directed. If you need to use it more often than prescribed, your condition may be worsening. If this happens, contact your healthcare provider.    If prescribed, finish all antibiotic medicine, even if you are feeling better after only a few days.  Follow-up care  Follow up with your healthcare provider, or as advised. If you had an X-ray or ECG (electrocardiogram), a specialist will review it. You will be notified of any new findings that may affect your care.  If you are age 65 or older, or if you have a chronic lung disease or condition that affects your immune system, or you smoke, ask your healthcare provider about getting a pneumococcal vaccine and a yearly flu shot (influenza vaccine).  When to seek medical advice  Call  your healthcare provider right away if any of these occur:    Fever of 100.4 F (38 C) or higher, or as directed by your healthcare provider    Coughing up increasing amounts of colored sputum    Weakness, drowsiness, headache, facial pain, ear pain, or a stiff neck  Call 911  Call 911 if any of these occur.    Coughing up blood    Worsening weakness, drowsiness, headache, or stiff neck    Increased wheezing not helped with medication, shortness of breath, or pain with breathing  Date Last Reviewed: 9/13/2015 2000-2017 [employer]. [address] [place] [address]. All rights reserved. This information is not intended as a substitute for professional medical care. Always follow your healthcare professional's instructions.          Bronchitis, Antibiotic Treatment (Adult)    Bronchitis is an infection of the air passages (bronchial tubes) in your lungs. It often occurs when you have a cold. This illness is contagious during the first few days and is spread through the air by coughing and sneezing, or by direct contact (touching the sick person and then touching your own eyes, nose, or mouth).  Symptoms of bronchitis include cough with mucus (phlegm) and low-grade fever. Bronchitis usually lasts 7 to 14 days. Mild cases can be treated with simple home remedies. More severe infection is treated with an antibiotic.  Home care  Follow these guidelines when caring for yourself at home:    If your symptoms are severe, rest at home for the first 2 to 3 days. When you go back to your usual activities, don't let yourself get too tired.    Do not smoke. Also avoid being exposed to secondhand smoke.    You may use over-the-counter medicines to control fever or pain, unless another medicine was prescribed. If you have chronic liver or kidney disease or have ever had a stomach ulcer or gastrointestinal bleeding, talk with your healthcare provider before using these medicines. Also talk to your provider if  you are taking medicine to prevent blood clots. Aspirin should never be given to anyone younger than 18 years of age who is ill with a viral infection or fever. It may cause severe liver or brain damage.    Your appetite may be poor, so a light diet is fine. Avoid dehydration by drinking 6 to 8 glasses of fluids per day (such as water, soft drinks, sports drinks, juices, tea, or soup). Extra fluids will help loosen secretions in the nose and lungs.    Over-the-counter cough, cold, and sore-throat medicines will not shorten the length of the illness, but they may be helpful to reduce symptoms. (Note: Do not use decongestants if you have high blood pressure.)    Finish all antibiotic medicine. Do this even if you are feeling better after only a few days.  Follow-up care  Follow up with your healthcare provider, or as advised. If you had an X-ray or ECG (electrocardiogram), a specialist will review it. You will be notified of any new findings that may affect your care.  If you are age 65 or older, or if you have a chronic lung disease or condition that affects your immune system, or you smoke, ask your healthcare provider about getting a pneumococcal vaccine and a yearly flu shot (influenza vaccine).  When to seek medical advice  Call your healthcare provider right away if any of these occur:    Fever of 100.4 F (38 C) or higher, or as directed by your healthcare provider    Coughing up increased amounts of colored sputum    Weakness, drowsiness, headache, facial pain, ear pain, or a stiff neck  Call 911  Call 911 if any of these occur.    Coughing up blood    Worsening weakness, drowsiness, headache, or stiff neck    Trouble breathing, wheezing, or pain with breathing  Date Last Reviewed: 9/13/2015 2000-2017 [employer]. [address] [place] [address], [place] [address]. All rights reserved. This information is not intended as a substitute for professional medical care. Always follow your healthcare  professional's instructions.          24 Hour Appointment Hotline       To make an appointment at any [facility], call [phone] ([phone]). If you don't have a family doctor or clinic, we will help you find one. [place] clinics are conveniently located to serve the needs of you and your family.             Review of your medicines      START taking        Dose / Directions Last dose taken    azithromycin 250 MG tablet   Commonly known as:  ZITHROMAX Z-MARKY   Quantity:  6 tablet        Two tablets on the first day, then one tablet daily for the next 4 days   Refills:  0        guaiFENesin-dextromethorphan 100-10 MG/5ML syrup   Commonly known as:  ROBITUSSIN DM   Dose:  10 mL   Quantity:  560 mL        Take 10 mLs by mouth 4 times daily as needed for cough   Refills:  0        predniSONE 20 MG tablet   Commonly known as:  DELTASONE   Quantity:  10 tablet        Take two tablets (= 40mg) each day for 5 (five) days   Refills:  0          CONTINUE these medicines which may have CHANGED, or have new prescriptions. If we are uncertain of the size of tablets/capsules you have at home, strength may be listed as something that might have changed.        Dose / Directions Last dose taken    albuterol 108 (90 Base) MCG/ACT Inhaler   Commonly known as:  PROAIR HFA/PROVENTIL HFA/VENTOLIN HFA   Dose:  2 puff   What changed:    - when to take this  - reasons to take this   Quantity:  1 Inhaler        Inhale 2 puffs into the lungs every 4 hours as needed for shortness of breath / dyspnea or wheezing   Refills:  0          Our records show that you are taking the medicines listed below. If these are incorrect, please call your family doctor or clinic.        Dose / Directions Last dose taken    amLODIPine 5 MG tablet   Commonly known as:  NORVASC   Dose:  5 mg   Quantity:  30 tablet        Take 1 tablet (5 mg) by mouth daily   Refills:  3        aspirin 81 MG EC tablet   Dose:  81 mg        Take 81 mg by mouth daily    Refills:  0        benzonatate 100 MG capsule   Commonly known as:  TESSALON PERLES   Dose:  100 mg   Quantity:  15 capsule        Take 1 capsule (100 mg) by mouth 3 times daily as needed for cough   Refills:  0        FLOMAX 0.4 MG capsule   Dose:  0.4 mg   Generic drug:  tamsulosin        Take 0.4 mg by mouth At Bedtime   Refills:  0        gabapentin 300 MG capsule   Commonly known as:  NEURONTIN   Dose:  300 mg   Quantity:  90 capsule        Take 1 capsule (300 mg) by mouth 3 times daily   Refills:  3        nitroGLYcerin 0.4 MG sublingual tablet   Commonly known as:  NITROSTAT   Dose:  0.4 mg   Quantity:  25 tablet        Place 1 tablet (0.4 mg) under the tongue every 5 minutes as needed for chest pain If you are still having symptoms after 3 doses (15 minutes) call 911.   Refills:  0        ZOCOR PO   Dose:  40 mg        Take 40 mg by mouth At Bedtime   Refills:  0                Prescriptions were sent or printed at these locations (4 Prescriptions)                   Other Prescriptions                Printed at Department/Unit printer (4 of 4)         albuterol (PROAIR HFA/PROVENTIL HFA/VENTOLIN HFA) 108 (90 Base) MCG/ACT Inhaler               azithromycin (ZITHROMAX Z-MARKY) 250 MG tablet               guaiFENesin-dextromethorphan (ROBITUSSIN DM) 100-10 MG/5ML syrup               predniSONE (DELTASONE) 20 MG tablet                Procedures and tests performed during your visit     Chest XR,  PA & LAT      Orders Needing Specimen Collection     None      Pending Results     Date and Time Order Name Status Description    6/18/2018 0244 Chest XR,  PA & LAT Preliminary             Pending Culture Results     No orders found from 6/16/2018 to 6/19/2018.            Pending Results Instructions     If you had any lab results that were not finalized at the time of your Discharge, you can call the ED Lab Result RN at [phone]. You will be contacted by this team for any positive Lab results or changes in  treatment. The nurses are available 7 days a week from 10A to 6:30P.  You can leave a message 24 hours per day and they will return your call.        Test Results From Your Hospital Stay        6/18/2018  3:30 AM      Narrative     XR CHEST 2 VIEWS   6/18/2018 3:23 AM     HISTORY: Cough.     COMPARISON: 5/19/2018.    FINDINGS: The heart size is normal. The lungs are clear. No  pneumothorax or pleural effusion.        Impression     IMPRESSION: No acute abnormality.                Clinical Quality Measure: Blood Pressure Screening     Your blood pressure was checked while you were in the emergency department today. The last reading we obtained was  BP: (!) 163/100 . Please read the guidelines below about what these numbers mean and what you should do about them.  If your systolic blood pressure (the top number) is less than 120 and your diastolic blood pressure (the bottom number) is less than 80, then your blood pressure is normal. There is nothing more that you need to do about it.  If your systolic blood pressure (the top number) is 120-139 or your diastolic blood pressure (the bottom number) is 80-89, your blood pressure may be higher than it should be. You should have your blood pressure rechecked within a year by a primary care provider.  If your systolic blood pressure (the top number) is 140 or greater or your diastolic blood pressure (the bottom number) is 90 or greater, you may have high blood pressure. High blood pressure is treatable, but if left untreated over time it can put you at risk for heart attack, stroke, or kidney failure. You should have your blood pressure rechecked by a primary care provider within the next 4 weeks.  If your provider in the emergency department today gave you specific instructions to follow-up with your doctor or provider even sooner than that, you should follow that instruction and not wait for up to 4 weeks for your follow-up visit.        Thank you for choosing [facility]   "     Thank you for choosing [facility] for your care. Our goal is always to provide you with excellent care. Hearing back from our patients is one way we can continue to improve our services. Please take a few minutes to complete the written survey that you may receive in the mail after you visit with us. Thank you!        [employer] Information     [employer] lets you send messages to your doctor, view your test results, renew your prescriptions, schedule appointments and more. To sign up, go to [email or web address] . Click on \"Log in\" on the left side of the screen, which will take you to the Welcome page. Then click on \"Sign up Now\" on the right side of the page.     You will be asked to enter the access code listed below, as well as some personal information. Please follow the directions to create your username and password.     Your access code is: [email or web address]  Expires: 2018  1:44 PM     Your access code will  in 90 days. If you need help or a new code, please call your [facility] clinic or [phone].        Care EveryWhere ID     This is your Care EveryWhere ID. This could be used by other organizations to access your [facility] medical records  [id number]        Equal Access to Services     JASE KUMAR : Mars Robert, waemmanuelda aimee, qaybta kaalmada adedaniel, hyacinth miller. So [facility] [phone].    ATENCIÓN: Si habla español, tiene a sahu disposición servicios gratuitos de asistencia lingüística. Llame al [phone].    We comply with applicable federal civil rights laws and [place] laws. We do not discriminate on the basis of race, color, national origin, age, disability, sex, sexual orientation, or gender identity.            After Visit Summary       This is your record. Keep this with you and show to your community pharmacist(s) and doctor(s) at your next visit.                  "

## 2018-06-18 NOTE — ED AVS SNAPSHOT
Emergency Department    [address] [facility] [address]    Phone:  [phone]    Fax:  [phone]                                       Abhilash Gonzalez   MRN: [id number]    Department:   Emergency Department   Date of Visit:  6/18/2018           After Visit Summary Signature Page     I have received my discharge instructions, and my questions have been answered. I have discussed any challenges I see with this plan with the nurse or doctor.    ..........................................................................................................................................  Patient/Patient Representative Signature      ..........................................................................................................................................  Patient Representative Print Name and Relationship to Patient    ..................................................               ................................................  Date                                            Time    ..........................................................................................................................................  Reviewed by Signature/Title    ...................................................              ..............................................  Date                                                            Time

## 2018-06-18 NOTE — DISCHARGE INSTRUCTIONS
Viral or Bacterial Bronchitis with Wheezing (Adult)    Bronchitis is an infection of the air passages. It often occurs during a cold and is usually caused by a virus. Symptoms include cough with mucus (phlegm) and low-grade fever. This illness is contagious during the first few days and is spread through the air by coughing and sneezing, or by direct contact (touching the sick person and then touching your own eyes, nose, or mouth).  If there is a lot of inflammation, air flow is restricted. The air passages may also go into spasm, especially if you have asthma. This causes wheezing and difficulty breathing even in people who do not have asthma.  Bronchitis usually lasts 7 to 14 days. The wheezing should improve with treatment during the first week. An inhaler is often prescribed to relax the air passages and stop wheezing. Antibiotics will be prescribed if your doctor thinks there is also a secondary bacterial infection.  Home care    If symptoms are severe, rest at home for the first 2 to 3 days. When you go back to your usual activities, don't let yourself get too tired.    Do not smoke. Also avoid being exposed to secondhand smoke.    You may use over-the-counter medicine to control fever or pain, unless another medicine was prescribed. Note: If you have chronic liver or kidney disease or have ever had a stomach ulcer or gastrointestinal bleeding, talk with your healthcare provider before using these medicines. Also talk to your provider if you are taking medicine to prevent blood clots.) Aspirin should never be given to anyone younger than 18 years of age who is ill with a viral infection or fever. It may cause severe liver or brain damage.    Your appetite may be poor, so a light diet is fine. Avoid dehydration by drinking 6 to 8 glasses of fluids per day (such as water, soft drinks, sports drinks, juices, tea, or soup). Extra fluids will help loosen secretions in the nose and lungs.    Over-the-counter  cough, cold, and sore-throat medicines will not shorten the length of the illness, but they may be helpful to reduce symptoms. (Note: Do not use decongestants if you have high blood pressure.)    If you were given an inhaler, use it exactly as directed. If you need to use it more often than prescribed, your condition may be worsening. If this happens, contact your healthcare provider.    If prescribed, finish all antibiotic medicine, even if you are feeling better after only a few days.  Follow-up care  Follow up with your healthcare provider, or as advised. If you had an X-ray or ECG (electrocardiogram), a specialist will review it. You will be notified of any new findings that may affect your care.  If you are age 65 or older, or if you have a chronic lung disease or condition that affects your immune system, or you smoke, ask your healthcare provider about getting a pneumococcal vaccine and a yearly flu shot (influenza vaccine).  When to seek medical advice  Call your healthcare provider right away if any of these occur:    Fever of 100.4 F (38 C) or higher, or as directed by your healthcare provider    Coughing up increasing amounts of colored sputum    Weakness, drowsiness, headache, facial pain, ear pain, or a stiff neck  Call 911  Call 911 if any of these occur.    Coughing up blood    Worsening weakness, drowsiness, headache, or stiff neck    Increased wheezing not helped with medication, shortness of breath, or pain with breathing  Date Last Reviewed: 9/13/2015 2000-2017 [employer]. [address] [place] [address]. All rights reserved. This information is not intended as a substitute for professional medical care. Always follow your healthcare professional's instructions.          Bronchitis, Antibiotic Treatment (Adult)    Bronchitis is an infection of the air passages (bronchial tubes) in your lungs. It often occurs when you have a cold. This illness is contagious during the  first few days and is spread through the air by coughing and sneezing, or by direct contact (touching the sick person and then touching your own eyes, nose, or mouth).  Symptoms of bronchitis include cough with mucus (phlegm) and low-grade fever. Bronchitis usually lasts 7 to 14 days. Mild cases can be treated with simple home remedies. More severe infection is treated with an antibiotic.  Home care  Follow these guidelines when caring for yourself at home:    If your symptoms are severe, rest at home for the first 2 to 3 days. When you go back to your usual activities, don't let yourself get too tired.    Do not smoke. Also avoid being exposed to secondhand smoke.    You may use over-the-counter medicines to control fever or pain, unless another medicine was prescribed. If you have chronic liver or kidney disease or have ever had a stomach ulcer or gastrointestinal bleeding, talk with your healthcare provider before using these medicines. Also talk to your provider if you are taking medicine to prevent blood clots. Aspirin should never be given to anyone younger than 18 years of age who is ill with a viral infection or fever. It may cause severe liver or brain damage.    Your appetite may be poor, so a light diet is fine. Avoid dehydration by drinking 6 to 8 glasses of fluids per day (such as water, soft drinks, sports drinks, juices, tea, or soup). Extra fluids will help loosen secretions in the nose and lungs.    Over-the-counter cough, cold, and sore-throat medicines will not shorten the length of the illness, but they may be helpful to reduce symptoms. (Note: Do not use decongestants if you have high blood pressure.)    Finish all antibiotic medicine. Do this even if you are feeling better after only a few days.  Follow-up care  Follow up with your healthcare provider, or as advised. If you had an X-ray or ECG (electrocardiogram), a specialist will review it. You will be notified of any new findings that may  affect your care.  If you are age 65 or older, or if you have a chronic lung disease or condition that affects your immune system, or you smoke, ask your healthcare provider about getting a pneumococcal vaccine and a yearly flu shot (influenza vaccine).  When to seek medical advice  Call your healthcare provider right away if any of these occur:    Fever of 100.4 F (38 C) or higher, or as directed by your healthcare provider    Coughing up increased amounts of colored sputum    Weakness, drowsiness, headache, facial pain, ear pain, or a stiff neck  Call 911  Call 911 if any of these occur.    Coughing up blood    Worsening weakness, drowsiness, headache, or stiff neck    Trouble breathing, wheezing, or pain with breathing  Date Last Reviewed: 9/13/2015 2000-2017 [employer]. [address] [place] [address], [place] [address]. All rights reserved. This information is not intended as a substitute for professional medical care. Always follow your healthcare professional's instructions.

## 2018-06-26 ENCOUNTER — APPOINTMENT (OUTPATIENT)
Dept: GENERAL RADIOLOGY | Facility: CLINIC | Age: 63
End: 2018-06-26
Attending: EMERGENCY MEDICINE
Payer: COMMERCIAL

## 2018-06-26 ENCOUNTER — HOSPITAL ENCOUNTER (EMERGENCY)
Facility: CLINIC | Age: 63
Discharge: HOME | End: 2018-06-27
Attending: EMERGENCY MEDICINE | Admitting: EMERGENCY MEDICINE
Payer: COMMERCIAL

## 2018-06-26 DIAGNOSIS — R05.9 COUGH: ICD-10-CM

## 2018-06-26 DIAGNOSIS — L03.116 CELLULITIS OF LEFT LOWER EXTREMITY: ICD-10-CM

## 2018-06-26 DIAGNOSIS — E87.6 HYPOKALEMIA: ICD-10-CM

## 2018-06-26 LAB
ANION GAP SERPL CALCULATED.3IONS-SCNC: 7 MMOL/L (ref 3–14)
BASOPHILS # BLD AUTO: 0 10E9/L (ref 0–0.2)
BASOPHILS NFR BLD AUTO: 0.1 %
BUN SERPL-MCNC: 16 MG/DL (ref 7–30)
CALCIUM SERPL-MCNC: 8.8 MG/DL (ref 8.5–10.1)
CHLORIDE SERPL-SCNC: 101 MMOL/L (ref 94–109)
CO2 SERPL-SCNC: 29 MMOL/L (ref 20–32)
CREAT SERPL-MCNC: 1.33 MG/DL (ref 0.66–1.25)
DIFFERENTIAL METHOD BLD: ABNORMAL
EOSINOPHIL # BLD AUTO: 0 10E9/L (ref 0–0.7)
EOSINOPHIL NFR BLD AUTO: 0.2 %
ERYTHROCYTE [DISTWIDTH] IN BLOOD BY AUTOMATED COUNT: 14.3 % (ref 10–15)
GFR SERPL CREATININE-BSD FRML MDRD: 55 ML/MIN/1.7M2
GLUCOSE SERPL-MCNC: 84 MG/DL (ref 70–99)
HCT VFR BLD AUTO: 49.1 % (ref 40–53)
HGB BLD-MCNC: 17.4 G/DL (ref 13.3–17.7)
IMM GRANULOCYTES # BLD: 0 10E9/L (ref 0–0.4)
IMM GRANULOCYTES NFR BLD: 0.3 %
LACTATE SERPL-SCNC: 1.6 MMOL/L (ref 0.4–2)
LYMPHOCYTES # BLD AUTO: 1 10E9/L (ref 0.8–5.3)
LYMPHOCYTES NFR BLD AUTO: 9.6 %
MCH RBC QN AUTO: 32.7 PG (ref 26.5–33)
MCHC RBC AUTO-ENTMCNC: 35.4 G/DL (ref 31.5–36.5)
MCV RBC AUTO: 92 FL (ref 78–100)
MONOCYTES # BLD AUTO: 1.2 10E9/L (ref 0–1.3)
MONOCYTES NFR BLD AUTO: 11.4 %
NEUTROPHILS # BLD AUTO: 8.4 10E9/L (ref 1.6–8.3)
NEUTROPHILS NFR BLD AUTO: 78.4 %
NRBC # BLD AUTO: 0 10*3/UL
NRBC BLD AUTO-RTO: 0 /100
PLATELET # BLD AUTO: 140 10E9/L (ref 150–450)
POTASSIUM SERPL-SCNC: 2.7 MMOL/L (ref 3.4–5.3)
RBC # BLD AUTO: 5.32 10E12/L (ref 4.4–5.9)
SODIUM SERPL-SCNC: 137 MMOL/L (ref 133–144)
WBC # BLD AUTO: 10.7 10E9/L (ref 4–11)

## 2018-06-26 PROCEDURE — 83605 ASSAY OF LACTIC ACID: CPT | Performed by: EMERGENCY MEDICINE

## 2018-06-26 PROCEDURE — 99285 EMERGENCY DEPT VISIT HI MDM: CPT | Mod: 25

## 2018-06-26 PROCEDURE — 85025 COMPLETE CBC W/AUTO DIFF WBC: CPT | Performed by: EMERGENCY MEDICINE

## 2018-06-26 PROCEDURE — 96360 HYDRATION IV INFUSION INIT: CPT

## 2018-06-26 PROCEDURE — 93005 ELECTROCARDIOGRAM TRACING: CPT

## 2018-06-26 PROCEDURE — 80048 BASIC METABOLIC PNL TOTAL CA: CPT | Performed by: EMERGENCY MEDICINE

## 2018-06-26 PROCEDURE — 25000132 ZZH RX MED GY IP 250 OP 250 PS 637: Performed by: EMERGENCY MEDICINE

## 2018-06-26 PROCEDURE — 87040 BLOOD CULTURE FOR BACTERIA: CPT | Performed by: EMERGENCY MEDICINE

## 2018-06-26 PROCEDURE — 71046 X-RAY EXAM CHEST 2 VIEWS: CPT

## 2018-06-26 RX ORDER — POTASSIUM CHLORIDE 1500 MG/1
20 TABLET, EXTENDED RELEASE ORAL DAILY
Qty: 30 TABLET | Refills: 1 | Status: SHIPPED | OUTPATIENT
Start: 2018-06-26 | End: 2018-07-19

## 2018-06-26 RX ORDER — IBUPROFEN 400 MG/1
800 TABLET, FILM COATED ORAL ONCE
Status: COMPLETED | OUTPATIENT
Start: 2018-06-26 | End: 2018-06-26

## 2018-06-26 RX ORDER — POTASSIUM CHLORIDE 1500 MG/1
40 TABLET, EXTENDED RELEASE ORAL ONCE
Status: COMPLETED | OUTPATIENT
Start: 2018-06-26 | End: 2018-06-27

## 2018-06-26 RX ORDER — SULFAMETHOXAZOLE/TRIMETHOPRIM 800-160 MG
1 TABLET ORAL 2 TIMES DAILY
Qty: 20 TABLET | Refills: 0 | Status: SHIPPED | OUTPATIENT
Start: 2018-06-26 | End: 2018-07-06

## 2018-06-26 RX ORDER — CEPHALEXIN 500 MG/1
500 CAPSULE ORAL 4 TIMES DAILY
Qty: 40 CAPSULE | Refills: 0 | Status: SHIPPED | OUTPATIENT
Start: 2018-06-26 | End: 2018-07-06

## 2018-06-26 RX ORDER — SULFAMETHOXAZOLE/TRIMETHOPRIM 800-160 MG
1 TABLET ORAL ONCE
Status: COMPLETED | OUTPATIENT
Start: 2018-06-26 | End: 2018-06-27

## 2018-06-26 RX ORDER — CEPHALEXIN 500 MG/1
500 CAPSULE ORAL ONCE
Status: COMPLETED | OUTPATIENT
Start: 2018-06-26 | End: 2018-06-27

## 2018-06-26 RX ORDER — IPRATROPIUM BROMIDE AND ALBUTEROL SULFATE 2.5; .5 MG/3ML; MG/3ML
3 SOLUTION RESPIRATORY (INHALATION) ONCE
Status: DISCONTINUED | OUTPATIENT
Start: 2018-06-26 | End: 2018-06-26

## 2018-06-26 RX ADMIN — IBUPROFEN 800 MG: 400 TABLET ORAL at 23:04

## 2018-06-26 ASSESSMENT — ENCOUNTER SYMPTOMS
CHILLS: 1
SHORTNESS OF BREATH: 1
FEVER: 1
WOUND: 1
COUGH: 1

## 2018-06-26 NOTE — ED AVS SNAPSHOT
Emergency Department    [address] [facility] [address]    Phone:  [phone]    Fax:  [phone]                                       Abhilash Gonzalez   MRN: [id number]    Department:   Emergency Department   Date of Visit:  6/26/2018           Patient Information     Date Of Birth          1955        Your diagnoses for this visit were:     Cellulitis of left lower extremity     Cough     Hypokalemia        You were seen by Aynaa Kenney MD.      Follow-up Information     Follow up with Follow-up with your Primary Doctor in 3-5 days to have your Potassium rechecked.        Follow up with  Emergency Department.    Specialty:  EMERGENCY MEDICINE    Why:  As needed, If symptoms worsen    Contact information:    [address] [facility] [address] [phone]        Discharge Instructions             Discharge Instructions for Cellulitis  You have been diagnosed with cellulitis. This is an infection in the deepest layer of the skin. In some cases, the infection also affects the muscle. Cellulitis is caused by bacteria. The bacteria can enter the body through broken skin. This can happen with a cut, scratch, animal bite, or an insect bite that has been scratched. You may have been treated in the hospital with antibiotics and fluids. You will likely be given a prescription for antibiotics to take at home. This sheet will help you take care of yourself at home.  Home care  When you are home:    Take the prescribed antibiotic medicine you are given as directed until it is gone. Take it even if you feel better. It treats the infection and stops it from returning. Not taking all the medicine can make future infections hard to treat.    Keep the infected area clean.    When possible, raise the infected area above the level of your heart. This helps keep swelling down.    Talk with your healthcare provider if you are in pain. Ask what kind of over-the-counter  medicine you can take for pain.    Apply clean bandages as advised.    Take your temperature once a day for a week.    Wash your hands often to prevent spreading the infection.  In the future, wash your hands before and after you touch cuts, scratches, or bandages. This will help prevent infection.   When to call your healthcare provider  Call your healthcare provider immediately if you have any of the following:    Difficulty or pain when moving the joints above or below the infected area    Discharge or pus draining from the area    Fever of 100.4 F (38 C) or higher, or as directed by your healthcare provider    Pain that gets worse in or around the infected     Redness that gets worse in or around the infected area, particularly if the area of redness expands to a wider area    Shaking chills    Swelling of the infected area    Vomiting   Date Last Reviewed: 8/1/2016 2000-2017 [employer]. [address] [place] [address]. All rights reserved. This information is not intended as a substitute for professional medical care. Always follow your healthcare professional's instructions.        Chronic Cough with Uncertain Cause (Adult)    Everyone has had a cough as part of the common cold, flu, or bronchitis. This kind of cough occurs along with an achy feeling, low-grade fever, nasal and sinus congestion, and a scratchy or sore throat. This usually gets better in 2 to 3 weeks. A cough that lasts longer than 3 weeks may be due to other causes.  If your cough does not improve over the next 2 weeks, further testing may be needed. Follow up with your healthcare provider as advised. Cough suppressants may be recommended. Based on your exam today, the exact cause of your cough is not certain. Below are some common causes for persistent cough.  Smoker's cough  Smoker s cough doesn t go away. If you continue to smoke, it only gets worse. The cough is from irritation in the air passages. Talk to your  healthcare provider about quitting. Medicines or nicotine-replacement products, like gum or the patch, may make quitting easier.  Postnasal drip  A cough that is worse at night may be due to postnasal drip. Excess mucus in the nose drains from the back of your nose to your throat. This triggers the cough reflex. Postnasal drip may be due to a sinus infection or allergy. Common allergens include dust, tobacco smoke (both inhaled and secondhand smoke), environmental pollutants, pollen, mold, pets, cleaning agents, room deodorizers, and chemical fumes. Over-the-counter antihistamines or decongestants may be helpful for allergies. A sinus infection may requires antibiotic treatment. See your healthcare provider if symptoms continue.  Medicines  Certain prescribed medicines can cause a chronic cough in some people:    ACE inhibitors for high blood pressure. These include benazepril, captopril, enalapril, fosinopril, lisinopril, quinapril, ramipril, and others.    Beta-blockers for high blood pressure and other conditions. These include propranolol, atenolol, metoprolol, nadolol, and others.  Let your healthcare provider know if you are taking any of these.  Asthma  Cough may be the only sign of mild asthma. You may have tests to find out if asthma is causing your cough. You may also take asthma medicine on a trial basis.  Acid reflux (heartburn, GERD)  The esophagus is the tube that carries food from the mouth to the stomach. A valve at its lower end prevents stomach acids from flowing upward. If this valve does not work properly, acid from the stomach enters the esophagus. This may cause a burning pain in the upper abdomen or lower chest, belching, or cough. Symptoms are often worse when lying flat. Avoid eating or drinking before bedtime. Try using extra pillows to raise your upper body, or place 4-inch blocks under the head of your bed. You may try an over-the-counter antacid or an acid-blocking medicine such as  famotidine, cimetidine, ranitidine, esomeprazole, lansoprazole, or omeprazole. Stronger medicines for this condition can be prescribed by your healthcare provider.  Follow-up care  Follow up with your healthcare provider, or as advised, if your cough does not improve. Further testing may be needed.  Note: If an X-ray was taken, a specialist will review it. You will be notified of any new findings that may affect your care.  When to seek medical advice  Call your healthcare provider right away if any of these occur:    Mild wheezing or difficulty breathing    Fever of 100.4 F (38 C) or higher, or as directed by your healthcare provider    Unexpected weight loss    Coughing up large amounts of colored sputum    Night sweats (sheets and pajamas get soaking wet)  Call 911  Call 911 if any of these occur:    Coughing up blood    Moderate to severe trouble breathing or wheezing  Date Last Reviewed: 9/13/2015 2000-2017 [employer]. [address] [place] [address]. All rights reserved. This information is not intended as a substitute for professional medical care. Always follow your healthcare professional's instructions.        Discharge Instructions for Hypokalemia  You have been diagnosed with hypokalemia. This means you have a low level of potassium in your blood. Potassium helps your nerve and muscle cells work as they should. These cells include the cells in your heart. A low level of potassium in the blood can cause serious problems, such as abnormal heart rhythms and even heart attack.  Diet changes  Eat more potassium-rich foods:    Bananas    Oranges and orange juice    Tomatoes, tomato sauce, and tomato juice    Leafy green vegetables, such as spinach, kale, salad greens, collards, and chard    Melons (all kinds)    Pomegranates    Peas    Beans    Potatoes    Sweet potatoes    Avocados, including guacamole    Vegetable juices, such as V8    Fruit juices    All nuts and seeds    Fish,  including tuna, halibut, salmon, cod, snapper, nikkie, swordfish, and perch    Milk, including fat-free, low-fat, whole, chocolate, and buttermilk    Soy milk  Other home care    Take a potassium supplement as directed by your healthcare provider.    After strenuous exercise or any activity that causes you to sweat a lot, grab a beverage high in potassium. This includes chocolate milk, coconut water, orange juice, or low-sodium vegetable juices.    Be sure to eat foods or drink fluids that contain potassium if you are having diarrhea or vomiting.    Have your potassium levels checked regularly.    Take all medicines exactly as directed.    Tell your healthcare provider about all prescription and over-the-counter medicines you are taking. This includes herbal products.    Avoid foods that are high in salt. Avoid canned and prepared foods that are high in salt.  Follow-up    Make a follow-up appointment as directed by our staff.    Keep all follow-up appointments. Your healthcare provider needs to monitor your condition closely.     When to call your healthcare provider  Call your provider right away or go to the emergency room if you have any of the following:    Vomiting    Fatigue    Diarrhea    Rapid, irregular heartbeat    Shortness of breath    Chest pain    Muscle cramps, spasms, or twitching    Weakness    Paralysis   Date Last Reviewed: 6/1/2017 2000-2017 [employer]. [address] [place] [address]. All rights reserved. This information is not intended as a substitute for professional medical care. Always follow your healthcare professional's instructions.              24 Hour Appointment Hotline       To make an appointment at any [facility] clinic, call [phone] ([phone]). If you don't have a family doctor or clinic, we will help you find one. [facility] clinics are conveniently located to serve the needs of you and your family.             Review of your medicines       START taking        Dose / Directions Last dose taken    cephALEXin 500 MG capsule   Commonly known as:  KEFLEX   Dose:  500 mg   Quantity:  40 capsule        Take 1 capsule (500 mg) by mouth 4 times daily for 10 days   Refills:  0        potassium chloride SA 20 MEQ CR tablet   Commonly known as:  KLOR-CON   Dose:  20 mEq   Quantity:  30 tablet        Take 1 tablet (20 mEq) by mouth daily   Refills:  1        sulfamethoxazole-trimethoprim 800-160 MG per tablet   Commonly known as:  BACTRIM DS   Dose:  1 tablet   Quantity:  20 tablet        Take 1 tablet by mouth 2 times daily for 10 days   Refills:  0          Our records show that you are taking the medicines listed below. If these are incorrect, please call your family doctor or clinic.        Dose / Directions Last dose taken    albuterol 108 (90 Base) MCG/ACT Inhaler   Commonly known as:  PROAIR HFA/PROVENTIL HFA/VENTOLIN HFA   Dose:  2 puff   Quantity:  1 Inhaler        Inhale 2 puffs into the lungs every 4 hours as needed for shortness of breath / dyspnea or wheezing   Refills:  0        amLODIPine 5 MG tablet   Commonly known as:  NORVASC   Dose:  5 mg   Quantity:  30 tablet        Take 1 tablet (5 mg) by mouth daily   Refills:  3        aspirin 81 MG EC tablet   Dose:  81 mg        Take 81 mg by mouth daily   Refills:  0        benzonatate 100 MG capsule   Commonly known as:  TESSALON PERLES   Dose:  100 mg   Quantity:  15 capsule        Take 1 capsule (100 mg) by mouth 3 times daily as needed for cough   Refills:  0        FLOMAX 0.4 MG capsule   Dose:  0.4 mg   Generic drug:  tamsulosin        Take 0.4 mg by mouth At Bedtime   Refills:  0        gabapentin 300 MG capsule   Commonly known as:  NEURONTIN   Dose:  300 mg   Quantity:  90 capsule        Take 1 capsule (300 mg) by mouth 3 times daily   Refills:  3        guaiFENesin-dextromethorphan 100-10 MG/5ML syrup   Commonly known as:  ROBITUSSIN DM   Dose:  10 mL   Quantity:  560 mL        Take 10 mLs  by mouth 4 times daily as needed for cough   Refills:  0        nitroGLYcerin 0.4 MG sublingual tablet   Commonly known as:  NITROSTAT   Dose:  0.4 mg   Quantity:  25 tablet        Place 1 tablet (0.4 mg) under the tongue every 5 minutes as needed for chest pain If you are still having symptoms after 3 doses (15 minutes) call 911.   Refills:  0        predniSONE 20 MG tablet   Commonly known as:  DELTASONE   Quantity:  10 tablet        Take two tablets (= 40mg) each day for 5 (five) days   Refills:  0        ZOCOR PO   Dose:  40 mg        Take 40 mg by mouth At Bedtime   Refills:  0                Prescriptions were sent or printed at these locations (3 Prescriptions)                   Other Prescriptions                Printed at Department/Unit printer (3 of 3)         cephALEXin (KEFLEX) 500 MG capsule               potassium chloride SA (KLOR-CON) 20 MEQ CR tablet               sulfamethoxazole-trimethoprim (BACTRIM DS) 800-160 MG per tablet                Procedures and tests performed during your visit     Procedure/Test Number of Times Performed    Basic metabolic panel 1    Blood culture 2    CBC with platelets differential 1    EKG 12-lead, tracing only 1    Lactic acid 1    XR Chest 2 Views 1      Orders Needing Specimen Collection     None      Pending Results     Date and Time Order Name Status Description    6/26/2018 2248 Blood culture In process     6/26/2018 2248 Blood culture In process             Pending Culture Results     Date and Time Order Name Status Description    6/26/2018 2248 Blood culture In process     6/26/2018 2248 Blood culture In process             Pending Results Instructions     If you had any lab results that were not finalized at the time of your Discharge, you can call the ED Lab Result RN at [phone]. You will be contacted by this team for any positive Lab results or changes in treatment. The nurses are available 7 days a week from 10A to 6:30P.  You can leave a message  24 hours per day and they will return your call.        Test Results From Your Hospital Stay        6/26/2018 11:32 PM      Narrative     XR CHEST 2 VW  6/26/2018 11:16 PM     INDICATION: Cough, fever.    COMPARISON: 6/18/2018.        Impression     IMPRESSION: No infiltrates or other acute findings. Heart size is  within normal limits. No change.    JOSÉ ANTONIO HE MD         6/26/2018 11:27 PM      Component Results     Component Value Ref Range & Units Status    Lactic Acid 1.6 0.4 - 2.0 mmol/L Final         6/27/2018  1:11 AM         6/27/2018  1:11 AM         6/26/2018 11:17 PM      Component Results     Component Value Ref Range & Units Status    WBC 10.7 4.0 - 11.0 10e9/L Final    RBC Count 5.32 4.4 - 5.9 10e12/L Final    Hemoglobin 17.4 13.3 - 17.7 g/dL Final    Hematocrit 49.1 40.0 - 53.0 % Final    MCV 92 78 - 100 fl Final    MCH 32.7 26.5 - 33.0 pg Final    MCHC 35.4 31.5 - 36.5 g/dL Final    RDW 14.3 10.0 - 15.0 % Final    Platelet Count 140 (L) 150 - 450 10e9/L Final    Diff Method Automated Method  Final    % Neutrophils 78.4 % Final    % Lymphocytes 9.6 % Final    % Monocytes 11.4 % Final    % Eosinophils 0.2 % Final    % Basophils 0.1 % Final    % Immature Granulocytes 0.3 % Final    Nucleated RBCs 0 0 /100 Final    Absolute Neutrophil 8.4 (H) 1.6 - 8.3 10e9/L Final    Absolute Lymphocytes 1.0 0.8 - 5.3 10e9/L Final    Absolute Monocytes 1.2 0.0 - 1.3 10e9/L Final    Absolute Eosinophils 0.0 0.0 - 0.7 10e9/L Final    Absolute Basophils 0.0 0.0 - 0.2 10e9/L Final    Abs Immature Granulocytes 0.0 0 - 0.4 10e9/L Final    Absolute Nucleated RBC 0.0  Final         6/26/2018 11:29 PM      Component Results     Component Value Ref Range & Units Status    Sodium 137 133 - 144 mmol/L Final    Potassium 2.7 (L) 3.4 - 5.3 mmol/L Final    Chloride 101 94 - 109 mmol/L Final    Carbon Dioxide 29 20 - 32 mmol/L Final    Anion Gap 7 3 - 14 mmol/L Final    Glucose 84 70 - 99 mg/dL Final    Urea Nitrogen 16 7 - 30  mg/dL Final    Creatinine 1.33 (H) 0.66 - 1.25 mg/dL Final    GFR Estimate 55 (L) >60 mL/min/1.7m2 Final    Non  GFR Calc    GFR Estimate If Black 66 >60 mL/min/1.7m2 Final    African American GFR Calc    Calcium 8.8 8.5 - 10.1 mg/dL Final                Clinical Quality Measure: Blood Pressure Screening     Your blood pressure was checked while you were in the emergency department today. The last reading we obtained was  BP: 142/62 . Please read the guidelines below about what these numbers mean and what you should do about them.  If your systolic blood pressure (the top number) is less than 120 and your diastolic blood pressure (the bottom number) is less than 80, then your blood pressure is normal. There is nothing more that you need to do about it.  If your systolic blood pressure (the top number) is 120-139 or your diastolic blood pressure (the bottom number) is 80-89, your blood pressure may be higher than it should be. You should have your blood pressure rechecked within a year by a primary care provider.  If your systolic blood pressure (the top number) is 140 or greater or your diastolic blood pressure (the bottom number) is 90 or greater, you may have high blood pressure. High blood pressure is treatable, but if left untreated over time it can put you at risk for heart attack, stroke, or kidney failure. You should have your blood pressure rechecked by a primary care provider within the next 4 weeks.  If your provider in the emergency department today gave you specific instructions to follow-up with your doctor or provider even sooner than that, you should follow that instruction and not wait for up to 4 weeks for your follow-up visit.        Thank you for choosing [facility]       Thank you for choosing [facility] for your care. Our goal is always to provide you with excellent care. Hearing back from our patients is one way we can continue to improve our services. Please take a few minutes to  "complete the written survey that you may receive in the mail after you visit with us. Thank you!        [employer] Information     [employer] lets you send messages to your doctor, view your test results, renew your prescriptions, schedule appointments and more. To sign up, go to [email or web address] . Click on \"Log in\" on the left side of the screen, which will take you to the Welcome page. Then click on \"Sign up Now\" on the right side of the page.     You will be asked to enter the access code listed below, as well as some personal information. Please follow the directions to create your username and password.     Your access code is: [email or web address]  Expires: 2018  1:44 PM     Your access code will  in 90 days. If you need help or a new code, please call your [facility] clinic or [phone].        Care EveryWhere ID     This is your Care EveryWhere ID. This could be used by other organizations to access your [facility] medical records  [id number]        Equal Access to Services     NIRAV KUMAR : Hadii corie hernandezo Soaida, waaxda luqadaha, qaybta kaalmamichael murray, hyacinth gusman . [facility] [phone].    ATENCIÓN: Si habla español, tiene a sahu disposición servicios gratuitos de asistencia lingüística. Llame al [phone].    We comply with applicable federal civil rights laws and [place] laws. We do not discriminate on the basis of race, color, national origin, age, disability, sex, sexual orientation, or gender identity.            After Visit Summary       This is your record. Keep this with you and show to your community pharmacist(s) and doctor(s) at your next visit.                  "

## 2018-06-26 NOTE — ED AVS SNAPSHOT
Emergency Department    [address] [facility] [address]    Phone:  [phone]    Fax:  [phone]                                       Abhilash Gonzalez   MRN: [id number]    Department:   Emergency Department   Date of Visit:  6/26/2018           After Visit Summary Signature Page     I have received my discharge instructions, and my questions have been answered. I have discussed any challenges I see with this plan with the nurse or doctor.    ..........................................................................................................................................  Patient/Patient Representative Signature      ..........................................................................................................................................  Patient Representative Print Name and Relationship to Patient    ..................................................               ................................................  Date                                            Time    ..........................................................................................................................................  Reviewed by Signature/Title    ...................................................              ..............................................  Date                                                            Time

## 2018-06-27 VITALS
WEIGHT: 180 LBS | TEMPERATURE: 99.2 F | DIASTOLIC BLOOD PRESSURE: 62 MMHG | RESPIRATION RATE: 18 BRPM | HEIGHT: 68 IN | OXYGEN SATURATION: 93 % | SYSTOLIC BLOOD PRESSURE: 142 MMHG | BODY MASS INDEX: 27.28 KG/M2

## 2018-06-27 LAB — INTERPRETATION ECG - MUSE: NORMAL

## 2018-06-27 PROCEDURE — 25000132 ZZH RX MED GY IP 250 OP 250 PS 637: Performed by: EMERGENCY MEDICINE

## 2018-06-27 PROCEDURE — 25000128 H RX IP 250 OP 636: Performed by: EMERGENCY MEDICINE

## 2018-06-27 PROCEDURE — 96360 HYDRATION IV INFUSION INIT: CPT

## 2018-06-27 RX ADMIN — SODIUM CHLORIDE 1000 ML: 9 INJECTION, SOLUTION INTRAVENOUS at 00:28

## 2018-06-27 RX ADMIN — CEPHALEXIN 500 MG: 500 CAPSULE ORAL at 00:28

## 2018-06-27 RX ADMIN — POTASSIUM CHLORIDE 40 MEQ: 1500 TABLET, EXTENDED RELEASE ORAL at 00:28

## 2018-06-27 RX ADMIN — SULFAMETHOXAZOLE AND TRIMETHOPRIM 1 TABLET: 800; 160 TABLET ORAL at 00:28

## 2018-06-27 NOTE — DISCHARGE INSTRUCTIONS
Discharge Instructions for Cellulitis  You have been diagnosed with cellulitis. This is an infection in the deepest layer of the skin. In some cases, the infection also affects the muscle. Cellulitis is caused by bacteria. The bacteria can enter the body through broken skin. This can happen with a cut, scratch, animal bite, or an insect bite that has been scratched. You may have been treated in the hospital with antibiotics and fluids. You will likely be given a prescription for antibiotics to take at home. This sheet will help you take care of yourself at home.  Home care  When you are home:    Take the prescribed antibiotic medicine you are given as directed until it is gone. Take it even if you feel better. It treats the infection and stops it from returning. Not taking all the medicine can make future infections hard to treat.    Keep the infected area clean.    When possible, raise the infected area above the level of your heart. This helps keep swelling down.    Talk with your healthcare provider if you are in pain. Ask what kind of over-the-counter medicine you can take for pain.    Apply clean bandages as advised.    Take your temperature once a day for a week.    Wash your hands often to prevent spreading the infection.  In the future, wash your hands before and after you touch cuts, scratches, or bandages. This will help prevent infection.   When to call your healthcare provider  Call your healthcare provider immediately if you have any of the following:    Difficulty or pain when moving the joints above or below the infected area    Discharge or pus draining from the area    Fever of 100.4 F (38 C) or higher, or as directed by your healthcare provider    Pain that gets worse in or around the infected     Redness that gets worse in or around the infected area, particularly if the area of redness expands to a wider area    Shaking chills    Swelling of the infected area    Vomiting   Date Last  Reviewed: 8/1/2016 2000-2017 "[employer]. [address] [place] [address], [place] [address]. All rights reserved. This information is not intended as a substitute for professional medical care. Always follow your healthcare professional's instructions.        Chronic Cough with Uncertain Cause (Adult)    Everyone has had a cough as part of the common cold, flu, or bronchitis. This kind of cough occurs along with an achy feeling, low-grade fever, nasal and sinus congestion, and a scratchy or sore throat. This usually gets better in 2 to 3 weeks. A cough that lasts longer than 3 weeks may be due to other causes.  If your cough does not improve over the next 2 weeks, further testing may be needed. Follow up with your healthcare provider as advised. Cough suppressants may be recommended. Based on your exam today, the exact cause of your cough is not certain. Below are some common causes for persistent cough.  Smoker's cough  Smoker s cough doesn t go away. If you continue to smoke, it only gets worse. The cough is from irritation in the air passages. Talk to your healthcare provider about quitting. Medicines or nicotine-replacement products, like gum or the patch, may make quitting easier.  Postnasal drip  A cough that is worse at night may be due to postnasal drip. Excess mucus in the nose drains from the back of your nose to your throat. This triggers the cough reflex. Postnasal drip may be due to a sinus infection or allergy. Common allergens include dust, tobacco smoke (both inhaled and secondhand smoke), environmental pollutants, pollen, mold, pets, cleaning agents, room deodorizers, and chemical fumes. Over-the-counter antihistamines or decongestants may be helpful for allergies. A sinus infection may requires antibiotic treatment. See your healthcare provider if symptoms continue.  Medicines  Certain prescribed medicines can cause a chronic cough in some people:    ACE inhibitors for high blood pressure.  These include benazepril, captopril, enalapril, fosinopril, lisinopril, quinapril, ramipril, and others.    Beta-blockers for high blood pressure and other conditions. These include propranolol, atenolol, metoprolol, nadolol, and others.  Let your healthcare provider know if you are taking any of these.  Asthma  Cough may be the only sign of mild asthma. You may have tests to find out if asthma is causing your cough. You may also take asthma medicine on a trial basis.  Acid reflux (heartburn, GERD)  The esophagus is the tube that carries food from the mouth to the stomach. A valve at its lower end prevents stomach acids from flowing upward. If this valve does not work properly, acid from the stomach enters the esophagus. This may cause a burning pain in the upper abdomen or lower chest, belching, or cough. Symptoms are often worse when lying flat. Avoid eating or drinking before bedtime. Try using extra pillows to raise your upper body, or place 4-inch blocks under the head of your bed. You may try an over-the-counter antacid or an acid-blocking medicine such as famotidine, cimetidine, ranitidine, esomeprazole, lansoprazole, or omeprazole. Stronger medicines for this condition can be prescribed by your healthcare provider.  Follow-up care  Follow up with your healthcare provider, or as advised, if your cough does not improve. Further testing may be needed.  Note: If an X-ray was taken, a specialist will review it. You will be notified of any new findings that may affect your care.  When to seek medical advice  Call your healthcare provider right away if any of these occur:    Mild wheezing or difficulty breathing    Fever of 100.4 F (38 C) or higher, or as directed by your healthcare provider    Unexpected weight loss    Coughing up large amounts of colored sputum    Night sweats (sheets and pajamas get soaking wet)  Call 911  Call 911 if any of these occur:    Coughing up blood    Moderate to severe trouble  breathing or wheezing  Date Last Reviewed: 9/13/2015 2000-2017 [employer] [address] [place] [address], [place] [address]. All rights reserved. This information is not intended as a substitute for professional medical care. Always follow your healthcare professional's instructions.        Discharge Instructions for Hypokalemia  You have been diagnosed with hypokalemia. This means you have a low level of potassium in your blood. Potassium helps your nerve and muscle cells work as they should. These cells include the cells in your heart. A low level of potassium in the blood can cause serious problems, such as abnormal heart rhythms and even heart attack.  Diet changes  Eat more potassium-rich foods:    Bananas    Oranges and orange juice    Tomatoes, tomato sauce, and tomato juice    Leafy green vegetables, such as spinach, kale, salad greens, collards, and chard    Melons (all kinds)    Pomegranates    Peas    Beans    Potatoes    Sweet potatoes    Avocados, including guacamole    Vegetable juices, such as V8    Fruit juices    All nuts and seeds    Fish, including tuna, halibut, salmon, cod, snapper, nikkie, swordfish, and perch    Milk, including fat-free, low-fat, whole, chocolate, and buttermilk    Soy milk  Other home care    Take a potassium supplement as directed by your healthcare provider.    After strenuous exercise or any activity that causes you to sweat a lot, grab a beverage high in potassium. This includes chocolate milk, coconut water, orange juice, or low-sodium vegetable juices.    Be sure to eat foods or drink fluids that contain potassium if you are having diarrhea or vomiting.    Have your potassium levels checked regularly.    Take all medicines exactly as directed.    Tell your healthcare provider about all prescription and over-the-counter medicines you are taking. This includes herbal products.    Avoid foods that are high in salt. Avoid canned and prepared foods that are high  in salt.  Follow-up    Make a follow-up appointment as directed by our staff.    Keep all follow-up appointments. Your healthcare provider needs to monitor your condition closely.     When to call your healthcare provider  Call your provider right away or go to the emergency room if you have any of the following:    Vomiting    Fatigue    Diarrhea    Rapid, irregular heartbeat    Shortness of breath    Chest pain    Muscle cramps, spasms, or twitching    Weakness    Paralysis   Date Last Reviewed: 6/1/2017 2000-2017 [employer]. [address] [place], [place] [id number]. All rights reserved. This information is not intended as a substitute for professional medical care. Always follow your healthcare professional's instructions.

## 2018-06-27 NOTE — ED PROVIDER NOTES
"  History     Chief Complaint:  Wound Check     HPI   Abhilash Gonzalez is a 62 year old male with history of hypertension, who presents to the emergency department today for evaluation of wound check to his left foot, as well as productive cough. The patient reports noticing an open blister to the top of his left foot last night and the foot has become increasingly more painful. Of note, the patient was previously evaluated for cough with no fever on 06/18/18 where he had a negative chest x-ray and was started on Z-eden, prednisone, and Robitussin. Today, he notes having chills the past couple of days and then developed the cough symptoms early this morning. He notes some shortness of breath, but denies a history of asthma or other respiratory diagnoses except for TB. The patient denies chest pain, except with cough.      Allergies:  Vicodin [Hydrocodone-Acetaminophen]      Medications:    Albuterol   Amlodipine   Aspirin  Benzonatate    Gabapentin   Simvastatin   Flomax    Robitussin  Prednisone       Past Medical History:    Depression   DJD  Hypertension   Polysubstance abuse   TB    Past Surgical History:    Right palm surgery   Mandible surgery  Right knee surgery     Family History:    Hypertension   Cerebrovascular disease     Social History:  He is here alone today, no family members or companions are here with him.   Smoking Status: current, 0.3 ppd, 15 years  Smokeless Tobacco: never  Alcohol Use: no   Marital Status:  Single [1]     Review of Systems   Constitutional: Positive for chills and fever.   Respiratory: Positive for cough and shortness of breath.    Cardiovascular: Negative for chest pain.   Skin: Positive for wound (left foot).   All other systems reviewed and are negative.    Physical Exam     Patient Vitals for the past 24 hrs:   BP Temp Temp src Heart Rate Resp SpO2 Height Weight   06/26/18 2243 (!) 195/103 101.4  F (38.6  C) Oral 111 20 97 % 1.727 m (5' 8\") 81.6 kg (180 lb)      Physical " Exam  General/Appearance: appears stated age, well-groomed, appears comfortable, warm to the touch  Eyes: EOMI, no scleral injection, no icterus  ENT: MMM  Neck: supple, nl ROM, no stiffness  Cardiovascular: tachycardic, nl S1S2, no m/r/g, 2+ pulses in all 4 extremities, cap refill <2sec  Respiratory: CTAB, good air movement throughout, no wheezes/rhonchi/rales, no increased WOB, no retractions, frequent dry cough  Back: no lesions  GI: abd soft, non-distended, nttp,  no HSM, no rebound, no guarding, nl BS  MSK: BARBOUR, good tone, no bony abnormality  Skin: warm and well-perfused, 1cm popped blister over distal L foot at base of 1st toe with slight erythema/warmth/ttp surrounding it  Neuro: GCS 15, alert and oriented, no gross focal neuro deficits  Psych: interacts appropriately  Heme: no petechia, no purpura, no active bleeding    Emergency Department Course     ECG:   ECG taken at 2341, ECG read at 2344  Sinus tachycardia with premature atrial complexes   Possible left atrial enlargement   Incomplete right BBB  Borderline ECG   No significant change from ECG dated 04/09/18.    Rate 103 bpm. MT interval 140. QRS duration 94. QT/QTc 366/479. P-R-T axes 59,53,80.     Imaging:  Radiology findings were communicated with the patient who voiced understanding of the findings.    XR Chest 2 Views  No infiltrates or other acute findings. Heart size is  within normal limits. No change.  Reading per radiology     Laboratory:  Laboratory findings were communicated with the patient who voiced understanding of the findings.  Lactic Acid (Collected at 2250): 1.6   CBC: WBC 10.7, HGB 17.4,  (L)  BMP: K 2.7 (L), Creat 1.33 (H), GFR 55 (L) o/w WNL     Blood Culture 1: pending  Blood Culture 2: pending     Interventions:  2304 Ibuprofen, 800 mg, PO   pending keflex 500 mg PO  pending bactrim 800-160 mg 1 tablet(s) PO   pending NS, 1 L, IV   pending Potassium Chloride 40 mEq PO      Emergency Department Course:  Nursing notes and  vitals reviewed.  I entered the room.  I performed an exam of the patient as documented above.     IV was inserted and blood was drawn for laboratory testing, results above.    The patient was sent for X-ray while in the emergency department, results above.     The patient received the above intervention(s).     2329 the patient was rechecked and updated regarding the results of the laboratory and imaging studies. The patient is asking for something to eat.    I discussed the treatment plan with the patient. They expressed understanding of this plan and consented to discharge. They will be discharged home with instructions for care and follow up. In addition, the patient will return to the emergency department if their symptoms worsen, if new symptoms arise or if there is any concern.  All questions were answered.     Impression & Plan      Medical Decision Making:  Abhilash Gonzalez is a 62 year old male who presents to the emergency department today for evaluation of cough as well as wound to his left foot.  In regards to the cough he has had it for several months.  8 days ago he was put on a prednisone burst and azithromycin, despite x-ray being negative, given the chronicity of the cough.  These is not significantly changed.  He has also been on meds for reflux and inhalers, none of which have markedly changed his cough.  X-ray here again today showed no acute change.  Upon arrival he was found that he was tachycardic and febrile however his white count and lactate are within normal limits.  The wound to his foot looks like he had a blister that is denuded.  There is some erythema/warmth/tenderness around it.  He will be started on Keflex and Bactrim for the wound.  Clinically he is well enough appearing that I think this can be treated as an outpatient.  The only other remarkable thing is his potassium is 2.7.  He is being given K-Dur here and will be started on K-Dur supplement.  I have asked him to follow-up  with his PCP to recheck his potassium in several days.    Diagnosis:    ICD-10-CM    1. Cellulitis of left lower extremity L03.116    2. Cough R05    3. Hypokalemia E87.6      Disposition:   The patient was discharged to home.    Discharge Medications:  New Prescriptions    CEPHALEXIN (KEFLEX) 500 MG CAPSULE    Take 1 capsule (500 mg) by mouth 4 times daily for 10 days    POTASSIUM CHLORIDE SA (KLOR-CON) 20 MEQ CR TABLET    Take 1 tablet (20 mEq) by mouth daily    SULFAMETHOXAZOLE-TRIMETHOPRIM (BACTRIM DS) 800-160 MG PER TABLET    Take 1 tablet by mouth 2 times daily for 10 days     Scribe Disclosure:  Jerry LOONEY, am serving as a scribe on 6/26/2018 to document services personally performed by Ayana Kenney*, based on my observations and the provider's statements to me.    EMERGENCY DEPARTMENT       Ayana Kenney MD  06/27/18 [id number]

## 2018-07-03 LAB
BACTERIA SPEC CULT: NO GROWTH
BACTERIA SPEC CULT: NO GROWTH
Lab: NORMAL
Lab: NORMAL
SPECIMEN SOURCE: NORMAL
SPECIMEN SOURCE: NORMAL

## 2018-07-06 ENCOUNTER — HOSPITAL ENCOUNTER (EMERGENCY)
Facility: CLINIC | Age: 63
Discharge: HOME | End: 2018-07-07
Attending: EMERGENCY MEDICINE | Admitting: EMERGENCY MEDICINE
Payer: COMMERCIAL

## 2018-07-06 DIAGNOSIS — E87.6 HYPOKALEMIA: ICD-10-CM

## 2018-07-06 DIAGNOSIS — J18.9 PNEUMONIA OF RIGHT LOWER LOBE DUE TO INFECTIOUS ORGANISM: ICD-10-CM

## 2018-07-06 LAB
BASOPHILS # BLD AUTO: 0 10E9/L (ref 0–0.2)
BASOPHILS NFR BLD AUTO: 0.3 %
DIFFERENTIAL METHOD BLD: ABNORMAL
EOSINOPHIL # BLD AUTO: 0.1 10E9/L (ref 0–0.7)
EOSINOPHIL NFR BLD AUTO: 1.6 %
ERYTHROCYTE [DISTWIDTH] IN BLOOD BY AUTOMATED COUNT: 13.6 % (ref 10–15)
HCT VFR BLD AUTO: 42.5 % (ref 40–53)
HGB BLD-MCNC: 14.5 G/DL (ref 13.3–17.7)
IMM GRANULOCYTES # BLD: 0 10E9/L (ref 0–0.4)
IMM GRANULOCYTES NFR BLD: 0.3 %
INTERPRETATION ECG - MUSE: NORMAL
LYMPHOCYTES # BLD AUTO: 1.2 10E9/L (ref 0.8–5.3)
LYMPHOCYTES NFR BLD AUTO: 31.9 %
MCH RBC QN AUTO: 31.6 PG (ref 26.5–33)
MCHC RBC AUTO-ENTMCNC: 34.1 G/DL (ref 31.5–36.5)
MCV RBC AUTO: 93 FL (ref 78–100)
MONOCYTES # BLD AUTO: 0.4 10E9/L (ref 0–1.3)
MONOCYTES NFR BLD AUTO: 9.6 %
NEUTROPHILS # BLD AUTO: 2.2 10E9/L (ref 1.6–8.3)
NEUTROPHILS NFR BLD AUTO: 56.3 %
NRBC # BLD AUTO: 0 10*3/UL
NRBC BLD AUTO-RTO: 0 /100
PLATELET # BLD AUTO: 234 10E9/L (ref 150–450)
RBC # BLD AUTO: 4.59 10E12/L (ref 4.4–5.9)
WBC # BLD AUTO: 3.9 10E9/L (ref 4–11)

## 2018-07-06 PROCEDURE — 99285 EMERGENCY DEPT VISIT HI MDM: CPT | Mod: 25

## 2018-07-06 PROCEDURE — 84484 ASSAY OF TROPONIN QUANT: CPT | Performed by: EMERGENCY MEDICINE

## 2018-07-06 PROCEDURE — 93005 ELECTROCARDIOGRAM TRACING: CPT

## 2018-07-06 PROCEDURE — 83880 ASSAY OF NATRIURETIC PEPTIDE: CPT | Performed by: EMERGENCY MEDICINE

## 2018-07-06 PROCEDURE — 96360 HYDRATION IV INFUSION INIT: CPT | Mod: 59

## 2018-07-06 PROCEDURE — 85025 COMPLETE CBC W/AUTO DIFF WBC: CPT | Performed by: EMERGENCY MEDICINE

## 2018-07-06 PROCEDURE — 85379 FIBRIN DEGRADATION QUANT: CPT | Performed by: EMERGENCY MEDICINE

## 2018-07-06 PROCEDURE — 80053 COMPREHEN METABOLIC PANEL: CPT | Performed by: EMERGENCY MEDICINE

## 2018-07-06 ASSESSMENT — ENCOUNTER SYMPTOMS
FEVER: 0
ARTHRALGIAS: 1
BACK PAIN: 1
VOMITING: 0
COUGH: 1
NAUSEA: 0
SHORTNESS OF BREATH: 1

## 2018-07-06 NOTE — ED AVS SNAPSHOT
Emergency Department    [address] [facility] [address]    Phone:  [phone]    Fax:  [phone]                                       Abhilash Gonzalez   MRN: [id number]    Department:   Emergency Department   Date of Visit:  7/6/2018           Patient Information     Date Of Birth          1955        Your diagnoses for this visit were:     Pneumonia of right lower lobe due to infectious organism (H)     Hypokalemia        You were seen by Aston Feliz MD.      Follow-up Information     Schedule an appointment as soon as possible for a visit to follow up.    Why:  With Your Physician        Follow up with  Emergency Department.    Specialty:  EMERGENCY MEDICINE    Why:  If symptoms worsen    Contact information:    [address] [facility] [address] [phone]        Discharge Instructions       Discharge Instructions  Bronchitis, Pneumonia, Bronchospasm    You were seen today for a chest infection or inflammation. If your provider decided this was due to a bacterial infection, you may need an antibiotic. Sometimes these are caused by a virus, and then an antibiotic will not help.     Generally, every Emergency Department visit should have a follow-up clinic visit with either a primary or a specialty clinic/provider. Please follow-up as instructed by your emergency provider today.    Return to the Emergency Department if:    Your breathing gets much worse.    You are very weak, or feel much more ill.    You develop new symptoms, such as chest pain.    You cough up blood.    You are vomiting (throwing up) enough that you cannot keep fluids or your medicine down.    What can I do to help myself?    Fill any prescriptions the provider gave you and take them right away--especially antibiotics. Be sure to finish the whole antibiotic prescription.    You may be given a prescription for an inhaler, which can help loosen tight air passages.  Use this as needed,  but not more often than directed. Inhalers work much better when used with a spacer.     You may be given a prescription for a steroid to reduce inflammation. Used long-term, these can have side effects, but for short-term use they are safe. You may notice restlessness or increased appetite.        You may use non-prescription cough or cold medicines. Cough medicines may help, but don t make the cough go away completely.     Avoid smoke, because this can make your symptoms worse. If you smoke, this may be a good time to quit! Consider using nicotine lozenges, gum, or patches to reduce cravings.     If you have a fever, Tylenol  (acetaminophen), Motrin  (ibuprofen), or Advil  (ibuprofen) may help bring fever down and may help you feel more comfortable. Be sure to read and follow the package directions, and ask your provider if you have questions.    Be sure to get your flu shot each year.  For certain ages, the pneumonia shot can help prevent pneumonia.  If you were given a prescription for medicine here today, be sure to read all of the information (including the package insert) that comes with your prescription.  This will include important information about the medicine, its side effects, and any warnings that you need to know about.  The pharmacist who fills the prescription can provide more information and answer questions you may have about the medicine.  If you have questions or concerns that the pharmacist cannot address, please call or return to the Emergency Department.     Remember that you can always come back to the Emergency Department if you are not able to see your regular provider in the amount of time listed above, if you get any new symptoms, or if there is anything that worries you.          Hypokalemia  Hypokalemia means a low level of potassium in the blood. This most often occurs in people who take water pills (diuretics). It can also occur because of severe vomiting or diarrhea. You may also  have it if you take laxatives for long periods of time. It sometimes happens if you have low magnesium (hypomagnesemia). If you have this, your healthcare provider will treat the low magnesium first.  A mild case of hypokalemia usually causes no symptoms. It is only found with blood testing. More severe potassium loss causes overall weakness, muscle or abdominal cramps, rapid or irregular heartbeats (heart palpitations), low blood pressure, and muscle weakness.   Home care    Take any potassium supplements as prescribed.    Eat foods rich in potassium. The highest amount is found in avocado, baked potatoes, spinach, cantaloupe, cod, halibut, salmon, and scallops. White, red, or caro beans are also very good sources. A modest amount of potassium is found in orange juice, bananas, carrots, and tomato juice.    If you take certain types of diuretics, you will also need to take potassium supplements. If you take a diuretic, discuss potassium supplements with your doctor.  Follow-up care  Follow up with your healthcare provider for a repeat blood test within the next week, or as advised by our staff.  When to seek medical advice  Call your healthcare provider right away if any of the following occur:    Increased weakness, fatigue, or muscle cramps    Dizziness  Call 911  Call 911 if any of the following occur:    Irregular heartbeat, extra beats, or very fast heart rate    Loss of consciousness  Date Last Reviewed: 7/1/2017 2000-2017 [employer]. [address] [place] [address]. All rights reserved. This information is not intended as a substitute for professional medical care. Always follow your healthcare professional's instructions.          24 Hour Appointment Hotline       To make an appointment at any [facility], call [phone] ([phone]). If you don't have a family doctor or clinic, we will help you find one. [facility] are conveniently located to serve the needs  of you and your family.             Review of your medicines      START taking        Dose / Directions Last dose taken    azithromycin 250 MG tablet   Commonly known as:  ZITHROMAX Z-MARKY   Quantity:  6 tablet        Two tablets on the first day, then one tablet daily for the next 4 days   Refills:  0        cefuroxime 500 MG tablet   Commonly known as:  CEFTIN   Dose:  500 mg   Quantity:  20 tablet        Take 1 tablet (500 mg) by mouth 2 times daily   Refills:  0          Our records show that you are taking the medicines listed below. If these are incorrect, please call your family doctor or clinic.        Dose / Directions Last dose taken    albuterol 108 (90 Base) MCG/ACT Inhaler   Commonly known as:  PROAIR HFA/PROVENTIL HFA/VENTOLIN HFA   Dose:  2 puff   Quantity:  1 Inhaler        Inhale 2 puffs into the lungs every 4 hours as needed for shortness of breath / dyspnea or wheezing   Refills:  0        amLODIPine 5 MG tablet   Commonly known as:  NORVASC   Dose:  5 mg   Quantity:  30 tablet        Take 1 tablet (5 mg) by mouth daily   Refills:  3        aspirin 81 MG EC tablet   Dose:  81 mg        Take 81 mg by mouth daily   Refills:  0        benzonatate 100 MG capsule   Commonly known as:  TESSALON PERLES   Dose:  100 mg   Quantity:  15 capsule        Take 1 capsule (100 mg) by mouth 3 times daily as needed for cough   Refills:  0        FLOMAX 0.4 MG capsule   Dose:  0.4 mg   Generic drug:  tamsulosin        Take 0.4 mg by mouth At Bedtime   Refills:  0        gabapentin 300 MG capsule   Commonly known as:  NEURONTIN   Dose:  300 mg   Quantity:  90 capsule        Take 1 capsule (300 mg) by mouth 3 times daily   Refills:  3        guaiFENesin-dextromethorphan 100-10 MG/5ML syrup   Commonly known as:  ROBITUSSIN DM   Dose:  10 mL   Quantity:  560 mL        Take 10 mLs by mouth 4 times daily as needed for cough   Refills:  0        nitroGLYcerin 0.4 MG sublingual tablet   Commonly known as:  NITROSTAT   Dose:   0.4 mg   Quantity:  25 tablet        Place 1 tablet (0.4 mg) under the tongue every 5 minutes as needed for chest pain If you are still having symptoms after 3 doses (15 minutes) call 911.   Refills:  0        potassium chloride SA 20 MEQ CR tablet   Commonly known as:  KLOR-CON   Dose:  20 mEq   Quantity:  30 tablet        Take 1 tablet (20 mEq) by mouth daily   Refills:  1        predniSONE 20 MG tablet   Commonly known as:  DELTASONE   Quantity:  10 tablet        Take two tablets (= 40mg) each day for 5 (five) days   Refills:  0        ZOCOR PO   Dose:  40 mg        Take 40 mg by mouth At Bedtime   Refills:  0          ASK your doctor about these medications        Dose / Directions Last dose taken    cephALEXin 500 MG capsule   Commonly known as:  KEFLEX   Dose:  500 mg   Quantity:  40 capsule   Ask about: Should I take this medication?        Take 1 capsule (500 mg) by mouth 4 times daily for 10 days   Refills:  0        sulfamethoxazole-trimethoprim 800-160 MG per tablet   Commonly known as:  BACTRIM DS   Dose:  1 tablet   Quantity:  20 tablet   Ask about: Should I take this medication?        Take 1 tablet by mouth 2 times daily for 10 days   Refills:  0                Prescriptions were sent or printed at these locations (2 Prescriptions)                   Other Prescriptions                Printed at Department/Unit printer (2 of 2)         azithromycin (ZITHROMAX Z-MARKY) 250 MG tablet               cefuroxime (CEFTIN) 500 MG tablet                Procedures and tests performed during your visit     CBC with platelets differential    CT Chest Pulmonary Embolism w Contrast    Comprehensive metabolic panel    D dimer quantitative    EKG 12-lead, tracing only    Nt probnp inpatient (BNP)    Peripheral IV catheter    Troponin I      Orders Needing Specimen Collection     None      Pending Results     No orders found for last 3 day(s).            Pending Culture Results     No orders found for last 3 day(s).             Pending Results Instructions     If you had any lab results that were not finalized at the time of your Discharge, you can call the ED Lab Result RN at [phone]. You will be contacted by this team for any positive Lab results or changes in treatment. The nurses are available 7 days a week from 10A to 6:30P.  You can leave a message 24 hours per day and they will return your call.        Test Results From Your Hospital Stay        7/6/2018 11:53 PM      Component Results     Component Value Ref Range & Units Status    WBC 3.9 (L) 4.0 - 11.0 10e9/L Final    RBC Count 4.59 4.4 - 5.9 10e12/L Final    Hemoglobin 14.5 13.3 - 17.7 g/dL Final    Hematocrit 42.5 40.0 - 53.0 % Final    MCV 93 78 - 100 fl Final    MCH 31.6 26.5 - 33.0 pg Final    MCHC 34.1 31.5 - 36.5 g/dL Final    RDW 13.6 10.0 - 15.0 % Final    Platelet Count 234 150 - 450 10e9/L Final    Diff Method Automated Method  Final    % Neutrophils 56.3 % Final    % Lymphocytes 31.9 % Final    % Monocytes 9.6 % Final    % Eosinophils 1.6 % Final    % Basophils 0.3 % Final    % Immature Granulocytes 0.3 % Final    Nucleated RBCs 0 0 /100 Final    Absolute Neutrophil 2.2 1.6 - 8.3 10e9/L Final    Absolute Lymphocytes 1.2 0.8 - 5.3 10e9/L Final    Absolute Monocytes 0.4 0.0 - 1.3 10e9/L Final    Absolute Eosinophils 0.1 0.0 - 0.7 10e9/L Final    Absolute Basophils 0.0 0.0 - 0.2 10e9/L Final    Abs Immature Granulocytes 0.0 0 - 0.4 10e9/L Final    Absolute Nucleated RBC 0.0  Final         7/7/2018 12:09 AM      Component Results     Component Value Ref Range & Units Status    D Dimer 0.3 0.0 - 0.50 ug/ml FEU Final    This D-dimer assay is intended for use in conjunction with a clinical pretest   probability assessment model to exclude pulmonary embolism (PE) and deep   venous thrombosis (DVT) in outpatients suspected of PE or DVT. The cut-off   value is 0.5 ug/mL FEU.           7/7/2018 12:14 AM      Component Results     Component Value Ref Range & Units  Status    Sodium 141 133 - 144 mmol/L Final    Potassium 2.8 (L) 3.4 - 5.3 mmol/L Final    Chloride 105 94 - 109 mmol/L Final    Carbon Dioxide 24 20 - 32 mmol/L Final    Anion Gap 12 3 - 14 mmol/L Final    Glucose 71 70 - 99 mg/dL Final    Urea Nitrogen 13 7 - 30 mg/dL Final    Creatinine 1.34 (H) 0.66 - 1.25 mg/dL Final    GFR Estimate 54 (L) >60 mL/min/1.7m2 Final    Non  GFR Calc    GFR Estimate If Black 65 >60 mL/min/1.7m2 Final    African American GFR Calc    Calcium 8.2 (L) 8.5 - 10.1 mg/dL Final    Bilirubin Total 0.7 0.2 - 1.3 mg/dL Final    Albumin 2.8 (L) 3.4 - 5.0 g/dL Final    Protein Total 6.7 (L) 6.8 - 8.8 g/dL Final    Alkaline Phosphatase 60 40 - 150 U/L Final    ALT 23 0 - 70 U/L Final    AST 21 0 - 45 U/L Final         7/7/2018 12:14 AM      Component Results     Component Value Ref Range & Units Status    Troponin I ES <0.015 0.000 - 0.045 ug/L Final    The 99th percentile for upper reference range is 0.045 ug/L.  Troponin values   in the range of 0.045 - 0.120 ug/L may be associated with risks of adverse   clinical events.           7/7/2018 12:14 AM      Component Results     Component Value Ref Range & Units Status    N-Terminal Pro BNP Inpatient 194 0 - 900 pg/mL Final       Reference range shown and results flagged as abnormal are suggested inpatient   cut points for confirming diagnosis if CHF in an acute setting. Establishing a   baseline value for each individual patient is useful for follow-up. An   inpatient or emergency department NT-proPBNP <300 pg/mL effectively rules out   acute CHF, with 99% negative predictive value.  The outpatient non-acute reference range for ruling out CHF is:   0-125 pg/mL (age 18 to less than 75)   0-450 pg/mL (age 75 yrs and older)           7/7/2018  1:38 AM      Narrative     CT CHEST PULMONARY EMBOLISM W CONTRAST  7/7/2018 1:10 AM     HISTORY: Dyspnea.     COMPARISON: CT chest 9/8/2015.    TECHNIQUE: Thin section axial images are  performed from the thoracic  inlet to the lung bases utilizing 68 mL of Isovue 370 IV contrast  without adverse event. Coronal reformatted images are also generated.  Radiation dose for this scan was reduced using automated exposure  control, adjustment of the mA and/or kV according to patient size, or  iterative reconstruction technique.    FINDINGS:   Chest: Calcified granuloma right upper lobe is noted on series 9,  image 180. Infiltrate posterior right lung base is concerning for  pneumonia. Left lung remains clear. No pleural or pericardial fluid.  Heart is normal in size. Esophagus is unremarkable. Thyroid gland is  normal in size. No enlarged axillary lymph nodes. Enlarged right hilar  lymph node measures 1.5 x 1.2 cm on image 70 of series 5. No evidence  of pulmonary embolism. Thoracic aorta is unremarkable. Limited images  upper abdomen demonstrate small hepatic cysts measuring less than 1 cm  each in size. No change since the prior CT. Bone window examination is  unremarkable.        Impression     IMPRESSION:  1. No evidence of pulmonary embolism. Thoracic aorta is unremarkable.  2. Focal area of consolidation posterior right lung base not well  appreciated on recent chest x-ray. Pneumonia is suspected. Probable  reactive right hilar lymph node.  3. Stable liver cysts.    MAMIE SMITH MD                Clinical Quality Measure: Blood Pressure Screening     Your blood pressure was checked while you were in the emergency department today. The last reading we obtained was  BP: (!) 145/106 . Please read the guidelines below about what these numbers mean and what you should do about them.  If your systolic blood pressure (the top number) is less than 120 and your diastolic blood pressure (the bottom number) is less than 80, then your blood pressure is normal. There is nothing more that you need to do about it.  If your systolic blood pressure (the top number) is 120-139 or your diastolic blood pressure (the  "bottom number) is 80-89, your blood pressure may be higher than it should be. You should have your blood pressure rechecked within a year by a primary care provider.  If your systolic blood pressure (the top number) is 140 or greater or your diastolic blood pressure (the bottom number) is 90 or greater, you may have high blood pressure. High blood pressure is treatable, but if left untreated over time it can put you at risk for heart attack, stroke, or kidney failure. You should have your blood pressure rechecked by a primary care provider within the next 4 weeks.  If your provider in the emergency department today gave you specific instructions to follow-up with your doctor or provider even sooner than that, you should follow that instruction and not wait for up to 4 weeks for your follow-up visit.        Thank you for choosing [facility]       Thank you for choosing [facility] for your care. Our goal is always to provide you with excellent care. Hearing back from our patients is one way we can continue to improve our services. Please take a few minutes to complete the written survey that you may receive in the mail after you visit with us. Thank you!        [employer] Information     [employer] lets you send messages to your doctor, view your test results, renew your prescriptions, schedule appointments and more. To sign up, go to [email or web address] . Click on \"Log in\" on the left side of the screen, which will take you to the Welcome page. Then click on \"Sign up Now\" on the right side of the page.     You will be asked to enter the access code listed below, as well as some personal information. Please follow the directions to create your username and password.     Your access code is: [email or web address]  Expires: 2018  1:44 PM     Your access code will  in 90 days. If you need help or a new code, please call your [facility] clinic or [phone].        Care EveryWhere ID     This is your Care EveryWhere ID. This " could be used by other organizations to access your [facility] medical records  [id number]        Equal Access to Services     JASE KUMAR : Hadii corie Robert, bryant yu, hyacinth tuttle. So [facility] [phone].    ATENCIÓN: Si habla español, tiene a sahu disposición servicios gratuitos de asistencia lingüística. Llame al [phone].    We comply with applicable federal civil rights laws and [place] laws. We do not discriminate on the basis of race, color, national origin, age, disability, sex, sexual orientation, or gender identity.            After Visit Summary       This is your record. Keep this with you and show to your community pharmacist(s) and doctor(s) at your next visit.

## 2018-07-06 NOTE — ED AVS SNAPSHOT
Emergency Department    [address] [facility] [address]    Phone:  [phone]    Fax:  [phone]                                       Abhilash Gonzalez   MRN: [id number]    Department:   Emergency Department   Date of Visit:  7/6/2018           After Visit Summary Signature Page     I have received my discharge instructions, and my questions have been answered. I have discussed any challenges I see with this plan with the nurse or doctor.    ..........................................................................................................................................  Patient/Patient Representative Signature      ..........................................................................................................................................  Patient Representative Print Name and Relationship to Patient    ..................................................               ................................................  Date                                            Time    ..........................................................................................................................................  Reviewed by Signature/Title    ...................................................              ..............................................  Date                                                            Time

## 2018-07-07 ENCOUNTER — APPOINTMENT (OUTPATIENT)
Dept: CT IMAGING | Facility: CLINIC | Age: 63
End: 2018-07-07
Attending: EMERGENCY MEDICINE
Payer: COMMERCIAL

## 2018-07-07 VITALS
SYSTOLIC BLOOD PRESSURE: 145 MMHG | HEIGHT: 68 IN | WEIGHT: 180 LBS | BODY MASS INDEX: 27.28 KG/M2 | RESPIRATION RATE: 10 BRPM | DIASTOLIC BLOOD PRESSURE: 106 MMHG | OXYGEN SATURATION: 95 % | HEART RATE: 89 BPM | TEMPERATURE: 98 F

## 2018-07-07 LAB
ALBUMIN SERPL-MCNC: 2.8 G/DL (ref 3.4–5)
ALP SERPL-CCNC: 60 U/L (ref 40–150)
ALT SERPL W P-5'-P-CCNC: 23 U/L (ref 0–70)
ANION GAP SERPL CALCULATED.3IONS-SCNC: 12 MMOL/L (ref 3–14)
AST SERPL W P-5'-P-CCNC: 21 U/L (ref 0–45)
BILIRUB SERPL-MCNC: 0.7 MG/DL (ref 0.2–1.3)
BUN SERPL-MCNC: 13 MG/DL (ref 7–30)
CALCIUM SERPL-MCNC: 8.2 MG/DL (ref 8.5–10.1)
CHLORIDE SERPL-SCNC: 105 MMOL/L (ref 94–109)
CO2 SERPL-SCNC: 24 MMOL/L (ref 20–32)
CREAT SERPL-MCNC: 1.34 MG/DL (ref 0.66–1.25)
D DIMER PPP FEU-MCNC: 0.3 UG/ML FEU (ref 0–0.5)
GFR SERPL CREATININE-BSD FRML MDRD: 54 ML/MIN/1.7M2
GLUCOSE SERPL-MCNC: 71 MG/DL (ref 70–99)
NT-PROBNP SERPL-MCNC: 194 PG/ML (ref 0–900)
POTASSIUM SERPL-SCNC: 2.8 MMOL/L (ref 3.4–5.3)
PROT SERPL-MCNC: 6.7 G/DL (ref 6.8–8.8)
SODIUM SERPL-SCNC: 141 MMOL/L (ref 133–144)
TROPONIN I SERPL-MCNC: <0.015 UG/L (ref 0–0.04)

## 2018-07-07 PROCEDURE — 25000128 H RX IP 250 OP 636: Performed by: EMERGENCY MEDICINE

## 2018-07-07 PROCEDURE — 71260 CT THORAX DX C+: CPT

## 2018-07-07 PROCEDURE — 25000132 ZZH RX MED GY IP 250 OP 250 PS 637: Performed by: EMERGENCY MEDICINE

## 2018-07-07 PROCEDURE — 25000125 ZZHC RX 250: Performed by: EMERGENCY MEDICINE

## 2018-07-07 RX ORDER — CEFUROXIME AXETIL 500 MG/1
500 TABLET ORAL 2 TIMES DAILY
Qty: 20 TABLET | Refills: 0 | Status: SHIPPED | OUTPATIENT
Start: 2018-07-07

## 2018-07-07 RX ORDER — AZITHROMYCIN 250 MG/1
TABLET, FILM COATED ORAL
Qty: 6 TABLET | Refills: 0 | Status: SHIPPED | OUTPATIENT
Start: 2018-07-07 | End: 2018-07-12

## 2018-07-07 RX ORDER — POTASSIUM CHLORIDE 1.5 G/1.58G
40 POWDER, FOR SOLUTION ORAL ONCE
Status: COMPLETED | OUTPATIENT
Start: 2018-07-07 | End: 2018-07-07

## 2018-07-07 RX ORDER — SODIUM CHLORIDE 9 MG/ML
1000 INJECTION, SOLUTION INTRAVENOUS CONTINUOUS
Status: DISCONTINUED | OUTPATIENT
Start: 2018-07-07 | End: 2018-07-07 | Stop reason: HOSPADM

## 2018-07-07 RX ORDER — IOPAMIDOL 755 MG/ML
68 INJECTION, SOLUTION INTRAVASCULAR ONCE
Status: COMPLETED | OUTPATIENT
Start: 2018-07-07 | End: 2018-07-07

## 2018-07-07 RX ORDER — LEVOFLOXACIN 500 MG/1
500 TABLET, FILM COATED ORAL DAILY
Qty: 10 TABLET | Refills: 0 | Status: SHIPPED | OUTPATIENT
Start: 2018-07-07 | End: 2018-07-07

## 2018-07-07 RX ORDER — AZITHROMYCIN 250 MG/1
500 TABLET, FILM COATED ORAL ONCE
Status: DISCONTINUED | OUTPATIENT
Start: 2018-07-07 | End: 2018-07-07

## 2018-07-07 RX ORDER — LEVOFLOXACIN 500 MG/1
500 TABLET, FILM COATED ORAL ONCE
Status: DISCONTINUED | OUTPATIENT
Start: 2018-07-07 | End: 2018-07-07

## 2018-07-07 RX ORDER — CEFUROXIME AXETIL 250 MG/1
250 TABLET ORAL ONCE
Status: DISCONTINUED | OUTPATIENT
Start: 2018-07-07 | End: 2018-07-07 | Stop reason: HOSPADM

## 2018-07-07 RX ADMIN — SODIUM CHLORIDE 1000 ML: 9 INJECTION, SOLUTION INTRAVENOUS at 00:32

## 2018-07-07 RX ADMIN — IOPAMIDOL 68 ML: 755 INJECTION, SOLUTION INTRAVENOUS at 01:05

## 2018-07-07 RX ADMIN — POTASSIUM CHLORIDE 40 MEQ: 1.5 POWDER, FOR SOLUTION ORAL at 00:32

## 2018-07-07 RX ADMIN — SODIUM CHLORIDE 92 ML: 9 INJECTION, SOLUTION INTRAVENOUS at 01:05

## 2018-07-07 NOTE — ED NOTES
Patient presents to ED with reports of one day of coughing up blood. Pt reports that he has had this cough for a while. Pt states that his sputum was white and today it turned blood. Patient denies any pain. Pt also reporting right lower thigh pain and swelling. Pulses to right leg WNL. Patient on cardiac monitor. Call light in reach.

## 2018-07-07 NOTE — ED PROVIDER NOTES
History     Chief Complaint:  Hemoptysis    HPI   Abhilash Gonzalez is a 62 year old male with a history of TB (diagnosed 2008) who presents to the emergency department for evaluation of hemoptysis. The patient reports that he has been coughing for months, which is exacerbated by deep breathing, but notes that he had hemoptysis today which prompted him to present to the ED. He further reports occasional shortness of breath and chest pain, as well as occasional low back pain; he attributes the last of these symptoms to two slipped discs in his back. The patient denies any known fever, nausea, or vomiting.    The patient has a secondary complaint of right leg pain and swelling at the knee. He states that the pain makes ambulating difficult. He is unable to say how long it has been bothering him, and he denies any trauma.    Allergies:  Vicodin    Medications:    Albuterol  Norvasc  Aspirin  Tessalon  Keflex  Neurontin  Robitussin  Nitrostat  Klor-con  Deltasone  Zocor  Bactrim  Flomax     Past Medical History:    Back pain  Depression  DJD  HTN  Polysubstance abuse  TB  Tobacco use disorder  DDD  Neuralgia and neuritis    Past Surgical History:    Bronchoscopy  Colonoscopy  Hand surgery  Mandible surgery    Family History:    HTN  Cerebrovascular disease    Social History:  Presents alone.  Current some day smoker, 0.30 ppd.  Negative for alcohol use.  Marital Status:  Single [1]     Review of Systems   Constitutional: Negative for fever.   Respiratory: Positive for cough (hemoptysis) and shortness of breath.    Cardiovascular: Positive for chest pain and leg swelling.   Gastrointestinal: Negative for nausea and vomiting.   Musculoskeletal: Positive for arthralgias, back pain and gait problem.   All other systems reviewed and are negative.    Physical Exam     Patient Vitals for the past 24 hrs:   BP Temp Temp src Pulse Heart Rate Resp SpO2 Height Weight   07/07/18 0120 (!) 145/106 - - - 85 10 - - -   07/07/18 0033 -  "- - - 79 13 95 % - -   07/06/18 2343 - - - - 77 17 95 % - -   07/06/18 2257 147/86 98  F (36.7  C) Oral 89 - 18 96 % 1.727 m (5' 8\") 81.6 kg (180 lb)       Physical Exam  Nursing note and vitals reviewed.  Constitutional:  Oriented to person, place, and time. Cooperative.   HENT:   Nose:    Nose normal.   Mouth/Throat:   Mucous membranes are normal.   Eyes:    Conjunctivae normal and EOM are normal.      Pupils are equal, round, and reactive to light.   Neck:    Trachea normal.   Cardiovascular:  Normal rate, regular rhythm, normal heart sounds and normal pulses. No murmur heard.  Pulmonary/Chest:  Effort normal and breath sounds normal with faint expiratory wheezes.   Abdominal:   Soft. Normal appearance and bowel sounds are normal.      There is no tenderness.      There is no rebound and no CVA tenderness.   Musculoskeletal:  Extremities atraumatic x 4. Right knee with a small effusion and pain along medial joint line, but no laxity with valgus or varus stress. No LE edema or calf tenderness.  Lymphadenopathy:  No cervical adenopathy.   Neurological:   Alert and oriented to person, place, and time. Normal strength.      No cranial nerve deficit or sensory deficit. GCS eye subscore is 4. GCS verbal subscore is 5. GCS motor subscore is 6.   Skin:    Skin is intact. No rash noted.   Psychiatric:   Normal mood and affect.      Emergency Department Course   ECG:  Indication: Hemoptysis  Time: 2330  Vent. Rate 75 bpm. NM interval 152. QRS duration 90. QT/QTc 444/495. P-R-T axis 52 48 76.  Normal sinus rhythm, Nonspecific T wave abnormality. Prolonged QT. Abnormal ECG. No significant change compared to EKG dated 6/26/18. Read time: 1133    Imaging:  Radiographic findings were communicated with the patient who voiced understanding of the findings.  CT Chest w/ IV contrast:   1. No evidence of pulmonary embolism. Thoracic aorta is unremarkable.  2. Focal area of consolidation posterior right lung base not " well  appreciated on recent chest x-ray. Pneumonia is suspected. Probable  reactive right hilar lymph node.  3. Stable liver cysts as per radiology.     Laboratory:  CBC: WBC: 3.9 (L), HGB: 14.5, PLT: 234  CMP: Glucose 71, Potassium 2.8 (L), Creatinine 1.34 (H), GFR estimate 54 (L), Calcium 8.2 (L), Albumin 2.8 (L), Protein total 6.7 (L), o/w WNL     0014 Troponin: <0.015  D Dimer: 0.3  BNP: 194    Interventions:  0032 NS 1,000 mLs   Klor-con 40 mEq  Please see MAR for full list of medications administered in the ED.    Emergency Department Course:  Nursing notes and vitals reviewed. (8321) I performed an exam of the patient as documented above.     IV inserted. Medicine administered as documented above. Blood drawn. This was sent to the lab for further testing, results above.    EKG obtained in the ED, see results above.     The patient was sent for a Chest CT while in the emergency department, findings above.     (1794) I rechecked the patient and discussed the results of his workup thus far.     Findings and plan explained to the Patient. Patient discharged home with instructions regarding supportive care, medications, and reasons to return. The importance of close follow-up was reviewed. The patient was prescribed Zithromax and Ceftin    I personally reviewed the laboratory results with the Patient and answered all related questions prior to discharge.       Impression & Plan    Medical Decision Making:  This is a 62-year-old male who came in for further evaluation of an ongoing cough and now hemoptysis today.  He has a few wheezes on exam, however he declines a nebulizer therapy here.  I was concerned that he may have pneumonia, but I also considered the possibility of pulmonary embolism or CHF.  He also has a history of treated tuberculosis, and therefore I was concerned a little bit that he could have recurrent TB as well.  I proceeded with the above workup, including EKG and blood work.  Despite his negative  d-dimer, I felt was reasonable at this point given the ongoing nature of his cough to proceed with a CT scan of his chest to look for not only a pulmonary embolism, but also any signs of pneumonia.  His CT scan is consistent with pneumonia.  Therefore he was provided an oral dose of cefuroxime here, and I will send him home with 10 days of that in addition to a Z-Faustino.  He was also provided oral potassium here.  He is to follow-up with his primary physician as soon as possible and certainly return here with any concerns or worsening symptoms.    Diagnosis:    ICD-10-CM    1. Pneumonia of right lower lobe due to infectious organism (H) J18.1    2. Hypokalemia E87.6        Disposition:  discharged to home    Discharge Medications:  Discharge Medication List as of 7/7/2018  1:49 AM      START taking these medications    Details   azithromycin (ZITHROMAX Z-FAUSTINO) 250 MG tablet Two tablets on the first day, then one tablet daily for the next 4 days, Disp-6 tablet, R-0, Local Print      cefuroxime (CEFTIN) 500 MG tablet Take 1 tablet (500 mg) by mouth 2 times daily, Disp-20 tablet, R-0, Local Print           IChacorta, nayana serving as a scribe on 7/6/2018 at 11:21 PM to personally document services performed by Aston Feliz MD based on my observations and the provider's statements to me.     Chacorta Stone  7/6/2018    EMERGENCY DEPARTMENT       Aston Feliz MD  07/07/18 0252

## 2018-07-07 NOTE — DISCHARGE INSTRUCTIONS
Discharge Instructions  Bronchitis, Pneumonia, Bronchospasm    You were seen today for a chest infection or inflammation. If your provider decided this was due to a bacterial infection, you may need an antibiotic. Sometimes these are caused by a virus, and then an antibiotic will not help.     Generally, every Emergency Department visit should have a follow-up clinic visit with either a primary or a specialty clinic/provider. Please follow-up as instructed by your emergency provider today.    Return to the Emergency Department if:    Your breathing gets much worse.    You are very weak, or feel much more ill.    You develop new symptoms, such as chest pain.    You cough up blood.    You are vomiting (throwing up) enough that you cannot keep fluids or your medicine down.    What can I do to help myself?    Fill any prescriptions the provider gave you and take them right away--especially antibiotics. Be sure to finish the whole antibiotic prescription.    You may be given a prescription for an inhaler, which can help loosen tight air passages.  Use this as needed, but not more often than directed. Inhalers work much better when used with a spacer.     You may be given a prescription for a steroid to reduce inflammation. Used long-term, these can have side effects, but for short-term use they are safe. You may notice restlessness or increased appetite.        You may use non-prescription cough or cold medicines. Cough medicines may help, but don t make the cough go away completely.     Avoid smoke, because this can make your symptoms worse. If you smoke, this may be a good time to quit! Consider using nicotine lozenges, gum, or patches to reduce cravings.     If you have a fever, Tylenol  (acetaminophen), Motrin  (ibuprofen), or Advil  (ibuprofen) may help bring fever down and may help you feel more comfortable. Be sure to read and follow the package directions, and ask your provider if you have questions.    Be sure  to get your flu shot each year.  For certain ages, the pneumonia shot can help prevent pneumonia.  If you were given a prescription for medicine here today, be sure to read all of the information (including the package insert) that comes with your prescription.  This will include important information about the medicine, its side effects, and any warnings that you need to know about.  The pharmacist who fills the prescription can provide more information and answer questions you may have about the medicine.  If you have questions or concerns that the pharmacist cannot address, please call or return to the Emergency Department.     Remember that you can always come back to the Emergency Department if you are not able to see your regular provider in the amount of time listed above, if you get any new symptoms, or if there is anything that worries you.          Hypokalemia  Hypokalemia means a low level of potassium in the blood. This most often occurs in people who take water pills (diuretics). It can also occur because of severe vomiting or diarrhea. You may also have it if you take laxatives for long periods of time. It sometimes happens if you have low magnesium (hypomagnesemia). If you have this, your healthcare provider will treat the low magnesium first.  A mild case of hypokalemia usually causes no symptoms. It is only found with blood testing. More severe potassium loss causes overall weakness, muscle or abdominal cramps, rapid or irregular heartbeats (heart palpitations), low blood pressure, and muscle weakness.   Home care    Take any potassium supplements as prescribed.    Eat foods rich in potassium. The highest amount is found in avocado, baked potatoes, spinach, cantaloupe, cod, halibut, salmon, and scallops. White, red, or caro beans are also very good sources. A modest amount of potassium is found in orange juice, bananas, carrots, and tomato juice.    If you take certain types of diuretics, you will  also need to take potassium supplements. If you take a diuretic, discuss potassium supplements with your doctor.  Follow-up care  Follow up with your healthcare provider for a repeat blood test within the next week, or as advised by our staff.  When to seek medical advice  Call your healthcare provider right away if any of the following occur:    Increased weakness, fatigue, or muscle cramps    Dizziness  Call 911  Call 911 if any of the following occur:    Irregular heartbeat, extra beats, or very fast heart rate    Loss of consciousness  Date Last Reviewed: 7/1/2017 2000-2017 [employer]. [address] [place] [id number]. All rights reserved. This information is not intended as a substitute for professional medical care. Always follow your healthcare professional's instructions.

## 2018-07-10 ENCOUNTER — APPOINTMENT (OUTPATIENT)
Dept: GENERAL RADIOLOGY | Facility: CLINIC | Age: 63
End: 2018-07-10
Attending: EMERGENCY MEDICINE
Payer: COMMERCIAL

## 2018-07-10 ENCOUNTER — HOSPITAL ENCOUNTER (EMERGENCY)
Facility: CLINIC | Age: 63
Discharge: HOME | End: 2018-07-10
Attending: EMERGENCY MEDICINE | Admitting: EMERGENCY MEDICINE
Payer: COMMERCIAL

## 2018-07-10 VITALS
DIASTOLIC BLOOD PRESSURE: 95 MMHG | SYSTOLIC BLOOD PRESSURE: 154 MMHG | TEMPERATURE: 98.3 F | HEIGHT: 68 IN | HEART RATE: 103 BPM | OXYGEN SATURATION: 94 % | RESPIRATION RATE: 20 BRPM

## 2018-07-10 DIAGNOSIS — J18.9 PNEUMONIA DUE TO INFECTIOUS ORGANISM, UNSPECIFIED LATERALITY, UNSPECIFIED PART OF LUNG: ICD-10-CM

## 2018-07-10 DIAGNOSIS — R04.2 HEMOPTYSIS: ICD-10-CM

## 2018-07-10 LAB
ANION GAP SERPL CALCULATED.3IONS-SCNC: 6 MMOL/L (ref 3–14)
BASOPHILS # BLD AUTO: 0 10E9/L (ref 0–0.2)
BASOPHILS NFR BLD AUTO: 0.3 %
BUN SERPL-MCNC: 15 MG/DL (ref 7–30)
CALCIUM SERPL-MCNC: 8.3 MG/DL (ref 8.5–10.1)
CHLORIDE SERPL-SCNC: 108 MMOL/L (ref 94–109)
CO2 SERPL-SCNC: 30 MMOL/L (ref 20–32)
CREAT SERPL-MCNC: 1.24 MG/DL (ref 0.66–1.25)
DIFFERENTIAL METHOD BLD: ABNORMAL
EOSINOPHIL # BLD AUTO: 0.1 10E9/L (ref 0–0.7)
EOSINOPHIL NFR BLD AUTO: 2.3 %
ERYTHROCYTE [DISTWIDTH] IN BLOOD BY AUTOMATED COUNT: 13.9 % (ref 10–15)
GFR SERPL CREATININE-BSD FRML MDRD: 59 ML/MIN/1.7M2
GLUCOSE SERPL-MCNC: 95 MG/DL (ref 70–99)
GRAM STN SPEC: NORMAL
HCT VFR BLD AUTO: 46.1 % (ref 40–53)
HGB BLD-MCNC: 16 G/DL (ref 13.3–17.7)
IMM GRANULOCYTES # BLD: 0 10E9/L (ref 0–0.4)
IMM GRANULOCYTES NFR BLD: 0.3 %
INR PPP: 1.02 (ref 0.86–1.14)
LYMPHOCYTES # BLD AUTO: 1.5 10E9/L (ref 0.8–5.3)
LYMPHOCYTES NFR BLD AUTO: 37.2 %
Lab: NORMAL
MCH RBC QN AUTO: 32.9 PG (ref 26.5–33)
MCHC RBC AUTO-ENTMCNC: 34.7 G/DL (ref 31.5–36.5)
MCV RBC AUTO: 95 FL (ref 78–100)
MONOCYTES # BLD AUTO: 0.4 10E9/L (ref 0–1.3)
MONOCYTES NFR BLD AUTO: 10.5 %
MYCOBACTERIUM SPEC CULT: NORMAL
NEUTROPHILS # BLD AUTO: 1.9 10E9/L (ref 1.6–8.3)
NEUTROPHILS NFR BLD AUTO: 49.4 %
NRBC # BLD AUTO: 0 10*3/UL
NRBC BLD AUTO-RTO: 0 /100
PLATELET # BLD AUTO: 222 10E9/L (ref 150–450)
POTASSIUM SERPL-SCNC: 3.4 MMOL/L (ref 3.4–5.3)
RBC # BLD AUTO: 4.87 10E12/L (ref 4.4–5.9)
SODIUM SERPL-SCNC: 144 MMOL/L (ref 133–144)
SPECIMEN SOURCE: NORMAL
WBC # BLD AUTO: 3.9 10E9/L (ref 4–11)

## 2018-07-10 PROCEDURE — 25000132 ZZH RX MED GY IP 250 OP 250 PS 637: Performed by: EMERGENCY MEDICINE

## 2018-07-10 PROCEDURE — 87070 CULTURE OTHR SPECIMN AEROBIC: CPT | Performed by: EMERGENCY MEDICINE

## 2018-07-10 PROCEDURE — 85025 COMPLETE CBC W/AUTO DIFF WBC: CPT | Performed by: EMERGENCY MEDICINE

## 2018-07-10 PROCEDURE — 87116 MYCOBACTERIA CULTURE: CPT | Performed by: EMERGENCY MEDICINE

## 2018-07-10 PROCEDURE — 85610 PROTHROMBIN TIME: CPT | Performed by: EMERGENCY MEDICINE

## 2018-07-10 PROCEDURE — 99285 EMERGENCY DEPT VISIT HI MDM: CPT | Mod: Z6 | Performed by: EMERGENCY MEDICINE

## 2018-07-10 PROCEDURE — 87106 FUNGI IDENTIFICATION YEAST: CPT | Performed by: EMERGENCY MEDICINE

## 2018-07-10 PROCEDURE — 80048 BASIC METABOLIC PNL TOTAL CA: CPT | Performed by: EMERGENCY MEDICINE

## 2018-07-10 PROCEDURE — 87102 FUNGUS ISOLATION CULTURE: CPT | Performed by: EMERGENCY MEDICINE

## 2018-07-10 PROCEDURE — 71046 X-RAY EXAM CHEST 2 VIEWS: CPT

## 2018-07-10 PROCEDURE — 87205 SMEAR GRAM STAIN: CPT | Performed by: EMERGENCY MEDICINE

## 2018-07-10 PROCEDURE — 99284 EMERGENCY DEPT VISIT MOD MDM: CPT | Mod: 25 | Performed by: EMERGENCY MEDICINE

## 2018-07-10 RX ORDER — AMLODIPINE BESYLATE 5 MG/1
10 TABLET ORAL ONCE
Status: COMPLETED | OUTPATIENT
Start: 2018-07-10 | End: 2018-07-10

## 2018-07-10 RX ADMIN — AMLODIPINE BESYLATE 10 MG: 5 TABLET ORAL at 05:29

## 2018-07-10 ASSESSMENT — ENCOUNTER SYMPTOMS
ABDOMINAL PAIN: 0
COUGH: 1
FEVER: 1
CHILLS: 1
SHORTNESS OF BREATH: 1

## 2018-07-10 NOTE — ED AVS SNAPSHOT
[facility], [place], Emergency Department    [address] [place] [id number]    Phone:  [phone]                                       Abhilash Gonzalez   MRN: [id number]    Department:  [facility], Emergency Department   Date of Visit:  7/10/2018           After Visit Summary Signature Page     I have received my discharge instructions, and my questions have been answered. I have discussed any challenges I see with this plan with the nurse or doctor.    ..........................................................................................................................................  Patient/Patient Representative Signature      ..........................................................................................................................................  Patient Representative Print Name and Relationship to Patient    ..................................................               ................................................  Date                                            Time    ..........................................................................................................................................  Reviewed by Signature/Title    ...................................................              ..............................................  Date                                                            Time

## 2018-07-10 NOTE — DISCHARGE INSTRUCTIONS
It's important that you call the [facility] tomorrow morning and tell them that you've previously had TB, and are now coughing up blood. You need to be seen in their clinic. Continue your current antibiotic. Try to stay in your home for the most part, and if you must leave, use a mask. You should also make an appointment with your primary care clinic. If your TB evaluation is negative, you will need further evaluation for cancer or other causes. Return to the ER with new or worsening symptoms.

## 2018-07-10 NOTE — ED PROVIDER NOTES
History     Chief Complaint   Patient presents with     Hemoptysis     HPI  Abhilash Gonzalez is a 62 year old male who sense with hemoptysis for 5 days.  He complains of occasional shortness of breath as well, though no chest pain.  He feels hot and cold, though has not checked his temp.  No bloody nose, those notes and sometimes stuffy.  No sore throat.  No abdominal pain, vomiting, diarrhea.  He was seen at an outside ER 4 days ago, diagnosed with pneumonia after CT scan was done, found negative for PE.  He was started on azithromycin and cefuroxime.  The azithromycin was done now, per his report, though he continues to have the cefuroxime.  He reports that he continues to have multiple episodes of hemoptysis per day.  It does not sound like he is coughing up a significant amount of blood, though he feels that almost every time he coughs he coughs up a small amount.  He does not take any blood thinners.  He does report a remote history of tuberculosis, for which he completed his treatment.  He presents today because he is concerned that symptoms are not improving.  He is a smoker.  He denies any recent travel outside [place].  He additionally complains of a red bump on his left upper arm which he noticed earlier in the day.  He states he wonders if he got a bug bite.  It is painful.    Past Medical History:   Diagnosis Date     Back pain      Depression      DJD (degenerative joint disease)      Hypertension      Polysubstance abuse      TB (tuberculosis) 2009    hx TB, states full regimine of medication taken in 2009 and xray in march 2018 looked good     TB lung, latent        Past Surgical History:   Procedure Laterality Date     BRONCHOSCOPY       COLONOSCOPY  12-5-12    Repeat Colonoscopy in 5 yrs.     HAND SURGERY      right palm     MANDIBLE SURGERY  10/2004     XR KNEE SURGERY JUANCHO RIGHT         Family History   Problem Relation Age of Onset     Hypertension Mother      Cerebrovascular Disease Brother   "      Social History   Substance Use Topics     Smoking status: Current Some Day Smoker     Packs/day: 0.30     Years: 15.00     Types: Cigarettes     Smokeless tobacco: Never Used     Alcohol use 0.0 oz/week     0 Standard drinks or equivalent per week      Comment: occasionally         I have reviewed the Medications, Allergies, Past Medical and Surgical History, and Social History in the Epic system.    Review of Systems   Constitutional: Positive for chills and fever (Subjective).   Respiratory: Positive for cough and shortness of breath.         Hemoptysis   Cardiovascular: Negative for chest pain.   Gastrointestinal: Negative for abdominal pain.   Skin:        Red area left upper arm is painful   All other systems reviewed and are negative.      Physical Exam   BP: (!) 164/112  Pulse: 103  Heart Rate: 102  Temp: 98.3  F (36.8  C)  Resp: 18  Height: 172.7 cm (5' 8\")  SpO2: 95 %      Physical Exam   Constitutional: No distress.   HENT:   Head: Atraumatic.   Mouth/Throat: Oropharynx is clear and moist. No oropharyngeal exudate.   Eyes: Pupils are equal, round, and reactive to light. No scleral icterus.   Cardiovascular: Normal heart sounds and intact distal pulses.    Pulmonary/Chest: Breath sounds normal. No respiratory distress.   Abdominal: Soft. There is no tenderness.   Musculoskeletal: He exhibits no edema or tenderness.   Skin: Skin is warm. No rash noted. He is not diaphoretic. There is erythema.            ED Course     ED Course     Procedures             Critical Care time:  none             Labs Ordered and Resulted from Time of ED Arrival Up to the Time of Departure from the ED   CBC WITH PLATELETS DIFFERENTIAL - Abnormal; Notable for the following:        Result Value    WBC 3.9 (*)     All other components within normal limits   BASIC METABOLIC PANEL - Abnormal; Notable for the following:     GFR Estimate 59 (*)     Calcium 8.3 (*)     All other components within normal limits   INR   SPUTUM " CULTURE AEROBIC BACTERIAL   GRAM STAIN   AFB CULTURE NON BLOOD   FUNGUS CULTURE            Assessments & Plan (with Medical Decision Making)   The patient is not toxic appearing, has no respiratory distress.  Lung sounds are normal.  X-ray does show faint infiltrate in the right lower lobe, consistent with opacity seen on previous CT.  White blood cell count is 3.9, which is consistent with previous.  Platelets are normal, as his INR.  Given his recent history of treated TB, I did speak with the infectious disease fellow, Dr. AGUDELO, who felt that, if the patient otherwise does not look as though he requires admission, this can be worked up as an outpatient.  She did recommend trying to send a sputum sample, the patient was able to produce one.  What he was able to produce was clear, and looked more like saliva.  It was sent down, though I do not know that it is getting to be able to be run.  She strongly recommends, and I passed on to him, that he should follow-up the [facility] TB clinic for further evaluation for possible recurrent tuberculosis.  He denies any recent time in [place], in homeless shelters, or broad.  The fellow also recommended I send his sputum for general culture as well as fungal culture.  She also noted that he should be evaluated for possible malignancy, given his smoking history.  I did discuss this with him.  I also strongly encouraged him to follow-up with his primary care clinic.  He verbalizes understanding, states he does have a primary care clinic.  He is told he should follow-up with both clinics.  He is instructed to return to the ER if new or worsening symptoms.  He is continuing to take his cefuroxime, it simply may be that he has pneumonia that is not completely treated at this point.  He does have several days left of his antibiotic and is encouraged to continue these.  He is also encouraged to return to the ER if new or worsening symptoms.  He is advised that he  should try to stay in his apartment, when able, and if he has to go out he should wear a mask.  He verbalizes understanding of the above, is agreeable to plan.    Dictation Disclaimer: Some of this Note has been completed with voice-recognition dictation software. Although errors are generally corrected real-time, there is the potential for a rare error to be present in the completed chart.      I have reviewed the nursing notes.    I have reviewed the findings, diagnosis, plan and need for follow up with the patient.    New Prescriptions    No medications on file       Final diagnoses:   Hemoptysis   Pneumonia due to infectious organism, unspecified laterality, unspecified part of lung       7/10/2018   [facility], [place], EMERGENCY DEPARTMENT     Neida Eldridge MD  07/10/18 0565

## 2018-07-10 NOTE — ED AVS SNAPSHOT
[facility], Emergency Department    [address] [address]    Phone:  [phone]                                       Abhilash Gonzalez   MRN: [id number]    Department:  [facility], Emergency Department   Date of Visit:  7/10/2018           Patient Information     Date Of Birth          1955        Your diagnoses for this visit were:     Hemoptysis     Pneumonia due to infectious organism, unspecified laterality, unspecified part of lung        You were seen by Neida Eldridge MD.        Discharge Instructions       It's important that you call the [facility] tomorrow morning and tell them that you've previously had TB, and are now coughing up blood. You need to be seen in their clinic. Continue your current antibiotic. Try to stay in your home for the most part, and if you must leave, use a mask. You should also make an appointment with your primary care clinic. If your TB evaluation is negative, you will need further evaluation for cancer or other causes. Return to the ER with new or worsening symptoms.     24 Hour Appointment Hotline       To make an appointment at any [facility] (formerly [facility]), call [phone] ([phone]). If you don't have a family doctor or clinic, we will help you find one. [facility] are conveniently located to serve the needs of you and your family.             Review of your medicines      Our records show that you are taking the medicines listed below. If these are incorrect, please call your family doctor or clinic.        Dose / Directions Last dose taken    albuterol 108 (90 Base) MCG/ACT Inhaler   Commonly known as:  PROAIR HFA/PROVENTIL HFA/VENTOLIN HFA   Dose:  2 puff   Quantity:  1 Inhaler        Inhale 2 puffs into the lungs every 4 hours as needed for shortness of breath / dyspnea or wheezing   Refills:  0        amLODIPine 5 MG tablet   Commonly known as:  NORVASC   Dose:  5 mg   Quantity:  30 tablet        Take  1 tablet (5 mg) by mouth daily   Refills:  3        aspirin 81 MG EC tablet   Dose:  81 mg        Take 81 mg by mouth daily   Refills:  0        azithromycin 250 MG tablet   Commonly known as:  ZITHROMAX Z-MARKY   Quantity:  6 tablet        Two tablets on the first day, then one tablet daily for the next 4 days   Refills:  0        benzonatate 100 MG capsule   Commonly known as:  TESSALON PERLES   Dose:  100 mg   Quantity:  15 capsule        Take 1 capsule (100 mg) by mouth 3 times daily as needed for cough   Refills:  0        cefuroxime 500 MG tablet   Commonly known as:  CEFTIN   Dose:  500 mg   Quantity:  20 tablet        Take 1 tablet (500 mg) by mouth 2 times daily   Refills:  0        FLOMAX 0.4 MG capsule   Dose:  0.4 mg   Generic drug:  tamsulosin        Take 0.4 mg by mouth At Bedtime   Refills:  0        gabapentin 300 MG capsule   Commonly known as:  NEURONTIN   Dose:  300 mg   Quantity:  90 capsule        Take 1 capsule (300 mg) by mouth 3 times daily   Refills:  3        nitroGLYcerin 0.4 MG sublingual tablet   Commonly known as:  NITROSTAT   Dose:  0.4 mg   Quantity:  25 tablet        Place 1 tablet (0.4 mg) under the tongue every 5 minutes as needed for chest pain If you are still having symptoms after 3 doses (15 minutes) call 911.   Refills:  0        potassium chloride SA 20 MEQ CR tablet   Commonly known as:  KLOR-CON   Dose:  20 mEq   Quantity:  30 tablet        Take 1 tablet (20 mEq) by mouth daily   Refills:  1        predniSONE 20 MG tablet   Commonly known as:  DELTASONE   Quantity:  10 tablet        Take two tablets (= 40mg) each day for 5 (five) days   Refills:  0        ZOCOR PO   Dose:  40 mg        Take 40 mg by mouth At Bedtime   Refills:  0                Procedures and tests performed during your visit     AFB Culture Non Blood    Basic metabolic panel    CBC with platelets differential    Chest XR,  PA & LAT    Fungus culture    Gram stain    INR    Sputum Culture Aerobic Bacterial     "  Orders Needing Specimen Collection     None      Pending Results     Date and Time Order Name Status Description    7/10/2018 0405 Fungus culture In process     7/10/2018 0405 AFB Culture Non Blood In process     7/10/2018 0405 Gram stain In process     7/10/2018 0405 Sputum Culture Aerobic Bacterial In process     7/10/2018 0227 Chest XR,  PA & LAT Preliminary             Pending Culture Results     Date and Time Order Name Status Description    7/10/2018 0405 Fungus culture In process     7/10/2018 0405 AFB Culture Non Blood In process     7/10/2018 0405 Gram stain In process     7/10/2018 0405 Sputum Culture Aerobic Bacterial In process             Pending Results Instructions     If you had any lab results that were not finalized at the time of your Discharge, you can call the ED Lab Result RN at [phone]. You will be contacted by this team for any positive Lab results or changes in treatment. The nurses are available 7 days a week from 10A to 6:30P.  You can leave a message 24 hours per day and they will return your call.        Thank you for choosing [facility]       Thank you for choosing [facility] for your care. Our goal is always to provide you with excellent care. Hearing back from our patients is one way we can continue to improve our services. Please take a few minutes to complete the written survey that you may receive in the mail after you visit with us. Thank you!        [employer] Information     [employer] lets you send messages to your doctor, view your test results, renew your prescriptions, schedule appointments and more. To sign up, go to [email or web address] . Click on \"Log in\" on the left side of the screen, which will take you to the Welcome page. Then click on \"Sign up Now\" on the right side of the page.     You will be asked to enter the access code listed below, as well as some personal information. Please follow the directions to create your username and password.     Your access code " is: [email or web address]  Expires: 10/8/2018  5:14 AM     Your access code will  in 90 days. If you need help or a new code, please call your [facility] clinic or [phone].        Care EveryWhere ID     This is your Care EveryWhere ID. This could be used by other organizations to access your [facility] medical records  [id number]        Equal Access to Services     [facility] HospitalLEONEL : Hadii corie hernandezo Soaida, waaxda luqadaha, qaybta kaalmada adeegyada, hyacinth gusman . So [facility] [phone].    ATENCIÓN: Si habla español, tiene a sahu disposición servicios gratuitos de asistencia lingüística. Llame al [phone].    We comply with applicable federal civil rights laws and [place] laws. We do not discriminate on the basis of race, color, national origin, age, disability, sex, sexual orientation, or gender identity.            After Visit Summary       This is your record. Keep this with you and show to your community pharmacist(s) and doctor(s) at your next visit.

## 2018-07-12 ENCOUNTER — TELEPHONE (OUTPATIENT)
Dept: EMERGENCY MEDICINE | Facility: CLINIC | Age: 63
End: 2018-07-12

## 2018-07-12 LAB
BACTERIA SPEC CULT: NORMAL
SPECIMEN SOURCE: NORMAL

## 2018-07-12 NOTE — TELEPHONE ENCOUNTER
[facility] Emergency Department Lab result notification:    [place] ED lab result protocol used  General Cultures    Reason for call  Notify of lab results, assess symptoms,  review ED providers recommendations/discharge instructions (if necessary) and advise per ED lab result f/u protocol    Lab Result  Preliminary Fungus culture (sputum specimen) report on 7/12/18 shows the presence of bacteria(s): Candida albicans / dubliniensis   Emergency Dept discharge antibiotic prescribed: None  As per  ED lab result protocol, consult with ED provider, if indicated, or await final culture result.  Information table from ED Provider visit on 7/10/18  Symptoms reported at ED visit (Chief complaint, HPI) Patient presents with     Hemoptysis      HPI  Abhilash Gonzalez is a 62 year old male who sense with hemoptysis for 5 days.  He complains of occasional shortness of breath as well, though no chest pain.  He feels hot and cold, though has not checked his temp.  No bloody nose, those notes and sometimes stuffy.  No sore throat.  No abdominal pain, vomiting, diarrhea.  He was seen at an outside ER 4 days ago, diagnosed with pneumonia after CT scan was done, found negative for PE.  He was started on azithromycin and cefuroxime.  The azithromycin was done now, per his report, though he continues to have the cefuroxime.  He reports that he continues to have multiple episodes of hemoptysis per day.  It does not sound like he is coughing up a significant amount of blood, though he feels that almost every time he coughs he coughs up a small amount.  He does not take any blood thinners.  He does report a remote history of tuberculosis, for which he completed his treatment.  He presents today because he is concerned that symptoms are not improving.  He is a smoker.  He denies any recent travel outside [place].  He additionally complains of a red bump on his left upper arm which he noticed earlier in the day.  He states he wonders if  he got a bug bite.  It is painful.       ED providers Impression and Plan (applicable information) The patient is not toxic appearing, has no respiratory distress.  Lung sounds are normal.  X-ray does show faint infiltrate in the right lower lobe, consistent with opacity seen on previous CT.  White blood cell count is 3.9, which is consistent with previous.  Platelets are normal, as his INR.  Given his recent history of treated TB, I did speak with the infectious disease fellow, Dr. AGUDELO, who felt that, if the patient otherwise does not look as though he requires admission, this can be worked up as an outpatient.  She did recommend trying to send a sputum sample, the patient was able to produce one.  What he was able to produce was clear, and looked more like saliva.  It was sent down, though I do not know that it is getting to be able to be run.  She strongly recommends, and I passed on to him, that he should follow-up the [facility] TB clinic for further evaluation for possible recurrent tuberculosis.  He denies any recent time in long-term, in homeless shelters, or broad.  The fellow also recommended I send his sputum for general culture as well as fungal culture.  She also noted that he should be evaluated for possible malignancy, given his smoking history.  I did discuss this with him.  I also strongly encouraged him to follow-up with his primary care clinic.  He verbalizes understanding, states he does have a primary care clinic.  He is told he should follow-up with both clinics.  He is instructed to return to the ER if new or worsening symptoms.  He is continuing to take his cefuroxime, it simply may be that he has pneumonia that is not completely treated at this point.  He does have several days left of his antibiotic and is encouraged to continue these.  He is also encouraged to return to the ER if new or worsening symptoms.  He is advised that he should try to stay in his apartment, when  able, and if he has to go out he should wear a mask.  He verbalizes understanding of the above, is agreeable to plan.      Miscellaneous information Final diagnoses:   Hemoptysis   Pneumonia due to infectious organism, unspecified laterality, unspecified part of lung        RN Assessment (Patient s current Symptoms), include time called.  [Insert Left message here if message left]  6:35 pm Message left to call us back at [phone], between 10 am and 6 pm, seven days a week. May leave a message 24/7, if no one available.     PCP follow-up Questions asked: NO    Chasidy Moreno RN  [facility] RN  Lung Nodule and ED Lab Result F/u RN  Epic pool (ED late result f/u RN): P [id number]  # [phone]

## 2018-07-19 ENCOUNTER — APPOINTMENT (OUTPATIENT)
Dept: GENERAL RADIOLOGY | Facility: CLINIC | Age: 63
End: 2018-07-19
Attending: EMERGENCY MEDICINE
Payer: COMMERCIAL

## 2018-07-19 ENCOUNTER — HOSPITAL ENCOUNTER (EMERGENCY)
Facility: CLINIC | Age: 63
Discharge: HOME | End: 2018-07-19
Attending: EMERGENCY MEDICINE | Admitting: EMERGENCY MEDICINE
Payer: COMMERCIAL

## 2018-07-19 VITALS
WEIGHT: 180 LBS | SYSTOLIC BLOOD PRESSURE: 168 MMHG | RESPIRATION RATE: 18 BRPM | BODY MASS INDEX: 27.28 KG/M2 | HEIGHT: 68 IN | DIASTOLIC BLOOD PRESSURE: 101 MMHG | HEART RATE: 97 BPM | OXYGEN SATURATION: 94 % | TEMPERATURE: 98.1 F

## 2018-07-19 DIAGNOSIS — R04.2 HEMOPTYSIS: ICD-10-CM

## 2018-07-19 DIAGNOSIS — R10.11 ABDOMINAL PAIN, RIGHT UPPER QUADRANT: ICD-10-CM

## 2018-07-19 DIAGNOSIS — I10 HYPERTENSION, UNSPECIFIED TYPE: ICD-10-CM

## 2018-07-19 LAB
ALBUMIN SERPL-MCNC: 3.1 G/DL (ref 3.4–5)
ALP SERPL-CCNC: 79 U/L (ref 40–150)
ALT SERPL W P-5'-P-CCNC: 25 U/L (ref 0–70)
ANION GAP SERPL CALCULATED.3IONS-SCNC: 8 MMOL/L (ref 3–14)
AST SERPL W P-5'-P-CCNC: 25 U/L (ref 0–45)
BASOPHILS # BLD AUTO: 0 10E9/L (ref 0–0.2)
BASOPHILS NFR BLD AUTO: 0.2 %
BILIRUB SERPL-MCNC: 0.7 MG/DL (ref 0.2–1.3)
BUN SERPL-MCNC: 16 MG/DL (ref 7–30)
CALCIUM SERPL-MCNC: 8.8 MG/DL (ref 8.5–10.1)
CHLORIDE SERPL-SCNC: 108 MMOL/L (ref 94–109)
CO2 SERPL-SCNC: 28 MMOL/L (ref 20–32)
CREAT SERPL-MCNC: 1.23 MG/DL (ref 0.66–1.25)
DIFFERENTIAL METHOD BLD: ABNORMAL
EOSINOPHIL # BLD AUTO: 0.1 10E9/L (ref 0–0.7)
EOSINOPHIL NFR BLD AUTO: 3.3 %
ERYTHROCYTE [DISTWIDTH] IN BLOOD BY AUTOMATED COUNT: 13.6 % (ref 10–15)
GFR SERPL CREATININE-BSD FRML MDRD: 60 ML/MIN/1.7M2
GLUCOSE SERPL-MCNC: 124 MG/DL (ref 70–99)
HCT VFR BLD AUTO: 47.1 % (ref 40–53)
HGB BLD-MCNC: 16.3 G/DL (ref 13.3–17.7)
IMM GRANULOCYTES # BLD: 0 10E9/L (ref 0–0.4)
IMM GRANULOCYTES NFR BLD: 0 %
INTERPRETATION ECG - MUSE: NORMAL
LIPASE SERPL-CCNC: 174 U/L (ref 73–393)
LYMPHOCYTES # BLD AUTO: 1.1 10E9/L (ref 0.8–5.3)
LYMPHOCYTES NFR BLD AUTO: 24.9 %
MCH RBC QN AUTO: 31.8 PG (ref 26.5–33)
MCHC RBC AUTO-ENTMCNC: 34.6 G/DL (ref 31.5–36.5)
MCV RBC AUTO: 92 FL (ref 78–100)
MONOCYTES # BLD AUTO: 0.6 10E9/L (ref 0–1.3)
MONOCYTES NFR BLD AUTO: 14.5 %
NEUTROPHILS # BLD AUTO: 2.5 10E9/L (ref 1.6–8.3)
NEUTROPHILS NFR BLD AUTO: 57.1 %
NRBC # BLD AUTO: 0 10*3/UL
NRBC BLD AUTO-RTO: 0 /100
PLATELET # BLD AUTO: 121 10E9/L (ref 150–450)
POTASSIUM SERPL-SCNC: 3.3 MMOL/L (ref 3.4–5.3)
PROT SERPL-MCNC: 6.8 G/DL (ref 6.8–8.8)
RBC # BLD AUTO: 5.12 10E12/L (ref 4.4–5.9)
SODIUM SERPL-SCNC: 144 MMOL/L (ref 133–144)
WBC # BLD AUTO: 4.3 10E9/L (ref 4–11)

## 2018-07-19 PROCEDURE — 71046 X-RAY EXAM CHEST 2 VIEWS: CPT

## 2018-07-19 PROCEDURE — 85025 COMPLETE CBC W/AUTO DIFF WBC: CPT | Performed by: EMERGENCY MEDICINE

## 2018-07-19 PROCEDURE — 93005 ELECTROCARDIOGRAM TRACING: CPT

## 2018-07-19 PROCEDURE — 83690 ASSAY OF LIPASE: CPT | Performed by: EMERGENCY MEDICINE

## 2018-07-19 PROCEDURE — 96360 HYDRATION IV INFUSION INIT: CPT

## 2018-07-19 PROCEDURE — 96361 HYDRATE IV INFUSION ADD-ON: CPT

## 2018-07-19 PROCEDURE — 25000132 ZZH RX MED GY IP 250 OP 250 PS 637: Performed by: EMERGENCY MEDICINE

## 2018-07-19 PROCEDURE — 80053 COMPREHEN METABOLIC PANEL: CPT | Performed by: EMERGENCY MEDICINE

## 2018-07-19 PROCEDURE — 25000128 H RX IP 250 OP 636: Performed by: EMERGENCY MEDICINE

## 2018-07-19 PROCEDURE — 99285 EMERGENCY DEPT VISIT HI MDM: CPT | Mod: 25

## 2018-07-19 RX ORDER — LISINOPRIL 10 MG/1
10 TABLET ORAL DAILY
Qty: 30 TABLET | Refills: 0 | Status: SHIPPED | OUTPATIENT
Start: 2018-07-19 | End: 2019-04-08

## 2018-07-19 RX ORDER — SODIUM CHLORIDE 9 MG/ML
1000 INJECTION, SOLUTION INTRAVENOUS CONTINUOUS
Status: DISCONTINUED | OUTPATIENT
Start: 2018-07-19 | End: 2018-07-19 | Stop reason: HOSPADM

## 2018-07-19 RX ORDER — ONDANSETRON 2 MG/ML
4 INJECTION INTRAMUSCULAR; INTRAVENOUS EVERY 30 MIN PRN
Status: DISCONTINUED | OUTPATIENT
Start: 2018-07-19 | End: 2018-07-19 | Stop reason: HOSPADM

## 2018-07-19 RX ORDER — PANTOPRAZOLE SODIUM 40 MG/1
40 TABLET, DELAYED RELEASE ORAL ONCE
Status: COMPLETED | OUTPATIENT
Start: 2018-07-19 | End: 2018-07-19

## 2018-07-19 RX ORDER — PANTOPRAZOLE SODIUM 40 MG/1
40 TABLET, DELAYED RELEASE ORAL DAILY
Qty: 30 TABLET | Refills: 0 | Status: SHIPPED | OUTPATIENT
Start: 2018-07-19 | End: 2018-08-18

## 2018-07-19 RX ORDER — POTASSIUM CHLORIDE 1.5 G/1.58G
40 POWDER, FOR SOLUTION ORAL ONCE
Status: COMPLETED | OUTPATIENT
Start: 2018-07-19 | End: 2018-07-19

## 2018-07-19 RX ADMIN — SODIUM CHLORIDE 1000 ML: 9 INJECTION, SOLUTION INTRAVENOUS at 10:31

## 2018-07-19 RX ADMIN — POTASSIUM CHLORIDE 40 MEQ: 1.5 POWDER, FOR SOLUTION ORAL at 11:47

## 2018-07-19 RX ADMIN — PANTOPRAZOLE SODIUM 40 MG: 40 TABLET, DELAYED RELEASE ORAL at 11:48

## 2018-07-19 ASSESSMENT — ENCOUNTER SYMPTOMS
SHORTNESS OF BREATH: 0
BLOOD IN STOOL: 0
NAUSEA: 1
COUGH: 1
ABDOMINAL PAIN: 1
VOMITING: 0
DIARRHEA: 0
FEVER: 0
ANAL BLEEDING: 0

## 2018-07-19 NOTE — DISCHARGE INSTRUCTIONS

## 2018-07-19 NOTE — ED PROVIDER NOTES
History     Chief Complaint:  Hemoptysis    HPI   Abhilash Gonzalez is a 62 year old male who presents with hemoptysis. The patient reports that he became nauseous with slight abdominal pain yesterday which has persisted through today. He reports that he had a coughing episode this morning, after which he had 2 episodes of hemoptysis and 1 episode of emesis. Patient has been seen for hemoptysis in the past but has not had any pathology identified that could cause this. On presentation patient is still experiencing some slight, right sided abdominal pain and subjective fever, but denies any dysuria, hematuria, black/bloody stools, or any other symptoms.     CBC with platelets differential 7/10/2018  WBC    3.9  RBC Count   4.87  Hemoglobin   16.0  Hematocrit   46.1  MCV    95  MCH    32.9  MCHC    34.7  RDW    13.9  Platelet Count   222  % Neutrophils   49.4  % Lymphocytes  37.2  % Monocytes   10.5  % Eosinophils   2.3  % Basophils   0.3  % Immature Granulocytes 0.3  Nucleated RBCs  0  Absolute Neutrophil  1.9  Absolute Lymphocytes 1.5  Absolute Monocytes  0.4  Absolute Eosinophils  0.1  Absolute Basophils  0.0  Abs Immature Granulocytes 0.0  Absolute Nucleated RBC 0.0    Basic metabolic panel 7/10/2018  Sodium    144  Potassium    3.4  Chloride    108  Carbon Dioxide   30  Anion Gap    6  Glucose    95  Urea Nitrogen    15  Creatinine    1.24  GFR Estimate    59  Non  GFR Calc  GFR Estimate If Black  72  African American GFR Calc  Calcium    8.3    INR on 7/10/2018  INR 1.02    Sputum Culture Aerobic Bacterial 7/10/2018  Specimen Description:  Sputum  Culture Micro:   Heavy growth, Normal brock     Gram stain 7/10/2018  Specimen Description: Sputum  Gram Stain   <25 PMNs/low power field  Gram Stain   <10 Squamous epithelial cells/low power field  Gram Stain Moderate Mixed gram positive and gram negative bacteria present.     Fungus culture 7/10/2018  Specimen Description Sputum  Culture Micro  "(Abnormal) Candida albicans / dubliniensis  isolated  Candida albicans and Candida dubliniensis are not routinely speciated     Allergies:  Vicodin [Hydrocodone-Acetaminophen]     Medications:    Albuterol inhaler  Cefuroxime  Nitroglycerin SL      Past Medical History:    Back pain  Depression  Degenerative joint disease  Hypertension  Polysubstance abuse  Tuberculosis    Past Surgical History:    Bronchoscopy  Colonoscopy  Right palm surgery  Mandible surgery  Knee surgery    Family History:    Mother-hypertension  Brother-cerebrovascular disease    Social History:  Smoking status: Current smoker (0.5 packs/day)  Alcohol use: Yes (occasional)  Marital Status:  Single      Review of Systems   Constitutional: Negative for fever.   Respiratory: Positive for cough. Negative for shortness of breath.    Cardiovascular: Negative for chest pain.   Gastrointestinal: Positive for abdominal pain and nausea. Negative for anal bleeding, blood in stool, diarrhea and vomiting.   All other systems reviewed and are negative.    Physical Exam     Patient Vitals:   BP Temp Temp src Pulse Resp SpO2 Height Weight   07/19/18 1133 - - - 99 - 93 % - -   07/19/18 1132 (!) 182/113 - - - - - - -   07/19/18 0908 143/74 98.1  F (36.7  C) Oral 110 20 97 % 1.727 m (5' 8\") 81.6 kg (180 lb)     Patient Vitals for the past 24 hrs:   BP Temp Temp src Pulse Resp SpO2 Height Weight   07/19/18 1225 (!) 168/101 - - 97 18 94 % - -   07/19/18 1200 (!) 168/101 - - - - 93 % - -   07/19/18 1133 - - - 99 - 93 % - -   07/19/18 1132 (!) 182/113 - - - - - - -   07/19/18 0908 143/74 98.1  F (36.7  C) Oral 110 20 97 % 1.727 m (5' 8\") 81.6 kg (180 lb)       Physical Exam  Constitutional: Black male supine, no respiratory distress  HENT: No signs of trauma.   Eyes: EOM are normal. Pupils are equal, round, and reactive to light.   Neck: Normal range of motion. No JVD present. No cervical adenopathy.  Cardiovascular: Regular rhythm.  Exam reveals no gallop and no " friction rub.    No murmur heard.  Pulmonary/Chest: Bilateral breath sounds normal. No wheezes, rhonchi or rales.  Abdominal: Soft. Mild RUQ tenderness, no guarding or rebound. 2+ femoral pulses  Rectal: Brown stool, hemoccult negative  Musculoskeletal: No edema. No tenderness.   Lymphadenopathy: No lymphadenopathy.   Neurological: Alert and oriented to person, place, and time. Normal strength. Coordination normal.   Skin: Skin is warm and dry. No rash noted. No erythema.     Emergency Department Course   ECG:  @ 1007  Indication: hematemesis  Vent. Rate 103 bpm. AK interval 140 ms. QRS duration 86 ms. QT/QTc 370/484 ms. P-R-T axis 58 42 78.   Sinus tachycardia. Nonspecific T wave abnormality.   Read by Dr. Cordero.    Imaging:  XR Chest 2 views  IMPRESSION: The lungs are well-inflated and clear without consolidation, edema, effusion, or pneumothorax. The cardiac silhouette is normal in size and contour.  Report per radiology.     Laboratory:  CBC:  WBC 4.3, HGB 16.3,   CMP: Potassium 3.3, Glucose 124, GFR 60, Albumin 3.1, otherwise WNL (Creatinine 1.23)  Lipase: 174    Interventions:  (1031) Normal Saline, 1 liter, IV bolus  (1147) Potassium Chloride Packet, 40 mEq, PO  (1148) Protonix 40 mg, IV injection    Emergency Department Course:  Nursing notes and vitals reviewed.  (3288) I performed an exam of the patient as documented above.    Blood was drawn from the patient. This was sent for laboratory testing, findings above.   The patient was sent for a x-ray while in the emergency department, findings above.   EKG was done, interpretation as above.    Findings and plan explained to the patient. Patient discharged home with instructions regarding supportive care, medications, and reasons to return. The importance of close follow-up was reviewed. The patient was prescribed Lisinopril and Protonix.    Impression & Plan    Medical Decision Making:  Abhilash Gonzalez is a 62 year old male who has had multiple ER  visits recently. On his last visit last 8 days ago he was seen for hemoptysis. He had some sputum sent for TB. He has a remote history of TB which was treated 4 days before when he was seen. He had a CT scan which showed a pneumonia but no pulmonary embolus. Patient states today that he had an episode where he had been coughing and then he vomited. He complains of a little soreness in his upper abdomen. He does not admit to drinking or smoking, and he does not have any black/bloody stools. On exam now, he is not coughing, he is not vomiting, his lungs are clear, he has some very mild RUQ tenderness with a non-surgical abdomen. He has no blood in his stool on rectal exam. Workup includes blood, EKG, and chest x-ray which are all essentially unremarkable. He has minimal thrombocytopenia and hypokalemia. I think his symptoms are more suggestive of gastritis or peptic ulcer disease than gallbladder. He does not have a surgical abdomen. His chest x-ray shows no persistent pneumonia. I will start him on Protonix, also given him a dose of potassium. His last blood pressure was high and he admits to not being on his blood pressure meds anymore so I will given him a refill of his Lisinopril. He needs to make a follow up with his primary doctor and he also should see pulmonary to discuss bronchoscopy, I have referred him to [facility]. If symptoms worsen, return to ED.      Diagnosis:    ICD-10-CM   1. Hemoptysis R04.2   2. Abdominal pain, right upper quadrant R10.11   3. Hypertension, unspecified type I10       Disposition:  Patient is discharged to home.    Discharge Medications:  New Prescriptions    LISINOPRIL (PRINIVIL/ZESTRIL) 10 MG TABLET    Take 1 tablet (10 mg) by mouth daily    PANTOPRAZOLE (PROTONIX) 40 MG EC TABLET    Take 1 tablet (40 mg) by mouth daily for 30 doses       Charles LOONEY, nayana serving as a scribe on 7/19/2018 at 9:51 AM to personally document services performed by Dr. Cordero based on  my observations and the provider's statements to me.        Charles Power  7/19/2018    EMERGENCY DEPARTMENT       Ivna Cordero MD  07/19/18 3719

## 2018-07-19 NOTE — ED AVS SNAPSHOT
Emergency Department    [address] [address]    Phone:  [phone]    Fax:  [phone]                                       Abhilash Gonzalez   MRN: [id number]    Department:   Emergency Department   Date of Visit:  7/19/2018           After Visit Summary Signature Page     I have received my discharge instructions, and my questions have been answered. I have discussed any challenges I see with this plan with the nurse or doctor.    ..........................................................................................................................................  Patient/Patient Representative Signature      ..........................................................................................................................................  Patient Representative Print Name and Relationship to Patient    ..................................................               ................................................  Date                                            Time    ..........................................................................................................................................  Reviewed by Signature/Title    ...................................................              ..............................................  Date                                                            Time

## 2018-07-19 NOTE — ED AVS SNAPSHOT
Emergency Department    [address] [facility] [address]    Phone:  [phone]    Fax:  [phone]                                       Abhilash Gonzalez   MRN: [id number]    Department:   Emergency Department   Date of Visit:  7/19/2018           Patient Information     Date Of Birth          1955        Your diagnoses for this visit were:     Hemoptysis     Abdominal pain, right upper quadrant     Hypertension, unspecified type        You were seen by Ivan Cordero MD.      Follow-up Information     Schedule an appointment as soon as possible for a visit with own md.        Schedule an appointment as soon as possible for a visit with [facility].    Contact information:    [address]  [place] [address] [phone]        Discharge Instructions         Abdominal Pain    Abdominal pain is pain in the stomach or belly area. Everyone has this pain from time to time. In many cases it goes away on its own. But abdominal pain can sometimes be due to a serious problem, such as appendicitis. So it s important to know when to seek help.  Causes of abdominal pain  There are many possible causes of abdominal pain. Common causes in adults include:    Constipation, diarrhea, or gas    Stomach acid flowing back up into the esophagus (acid reflux or heartburn)    Severe acid reflux, called GERD (gastroesophageal reflux disease)    A sore in the lining of the stomach or small intestine (peptic ulcer)    Inflammation of the gallbladder, liver, or pancreas    Gallstones or kidney stones    Appendicitis     Intestinal blockage     An internal organ pushing through a muscle or other tissue (hernia)    Urinary tract infections    In women, menstrual cramps, fibroids, or endometriosis    Inflammation or infection of the intestines  Diagnosing the cause of abdominal pain  Your healthcare provider will do a physical exam help find the cause of your pain. If  needed, tests will be ordered. Belly pain has many possible causes. So it can be hard to find the reason for your pain. Giving details about your pain can help. Tell your provider where and when you feel the pain, and what makes it better or worse. Also let your provider know if you have other symptoms such as:    Fever    Tiredness    Upset stomach (nausea)    Vomiting    Changes in bathroom habits  Treating abdominal pain  Some causes of pain need emergency medical treatment right away. These include appendicitis or a bowel blockage. Other problems can be treated with rest, fluids, or medicines. Your healthcare provider can give you specific instructions for treatment or self-care based on what is causing your pain.  If you have vomiting or diarrhea, sip water or other clear fluids. When you are ready to eat solid foods again, start with small amounts of easy-to-digest, low-fat foods. These include apple sauce, toast, or crackers.   When to seek medical care  Call 911 or go to the hospital right away if you:    Can t pass stool and are vomiting    Are vomiting blood or have bloody diarrhea or black, tarry diarrhea    Have chest, neck, or shoulder pain    Feel like you might pass out    Have pain in your shoulder blades with nausea    Have sudden, severe belly pain    Have new, severe pain unlike any you have felt before    Have a belly that is rigid, hard, and tender to touch  Call your healthcare provider if you have:    Pain for more than 5 days    Bloating for more than 2 days    Diarrhea for more than 5 days    A fever of 100.4 F (38 C) or higher, or as directed by your healthcare provider    Pain that gets worse    Weight loss for no reason    Continued lack of appetite    Blood in your stool  How to prevent abdominal pain  Here are some tips to help prevent abdominal pain:    Eat smaller amounts of food at one time.    Avoid greasy, fried, or other high-fat foods.    Avoid foods that give you  gas.    Exercise regularly.    Drink plenty of fluids.  To help prevent GERD symptoms:    Quit smoking.    Reduce alcohol and certain foods that increase stomach acid.    Avoid aspirin and over-the-counter pain and fever medicines (NSAIDS or nonsteroidal anti-inflammatory drugs), if possible    Lose extra weight.    Finish eating at least 2 hours before you go to bed or lie down.    Raise the head of your bed.  Date Last Reviewed: 7/1/2016 2000-2017 [employer]. [address] [place] [address], [place] [address]. All rights reserved. This information is not intended as a substitute for professional medical care. Always follow your healthcare professional's instructions.          Discharge References/Attachments     HEMOPTYSIS (ENGLISH)      24 Hour Appointment Hotline       To make an appointment at any [facility], call [phone] ([phone]). If you don't have a family doctor or clinic, we will help you find one. [facility] clinics are conveniently located to serve the needs of you and your family.             Review of your medicines      START taking        Dose / Directions Last dose taken    lisinopril 10 MG tablet   Commonly known as:  PRINIVIL/ZESTRIL   Dose:  10 mg   Quantity:  30 tablet        Take 1 tablet (10 mg) by mouth daily   Refills:  0        pantoprazole 40 MG EC tablet   Commonly known as:  PROTONIX   Dose:  40 mg   Quantity:  30 tablet        Take 1 tablet (40 mg) by mouth daily for 30 doses   Refills:  0          Our records show that you are taking the medicines listed below. If these are incorrect, please call your family doctor or clinic.        Dose / Directions Last dose taken    albuterol 108 (90 Base) MCG/ACT Inhaler   Commonly known as:  PROAIR HFA/PROVENTIL HFA/VENTOLIN HFA   Dose:  2 puff   Quantity:  1 Inhaler        Inhale 2 puffs into the lungs every 4 hours as needed for shortness of breath / dyspnea or wheezing   Refills:  0        cefuroxime 500 MG tablet    Commonly known as:  CEFTIN   Dose:  500 mg   Quantity:  20 tablet        Take 1 tablet (500 mg) by mouth 2 times daily   Refills:  0        nitroGLYcerin 0.4 MG sublingual tablet   Commonly known as:  NITROSTAT   Dose:  0.4 mg   Quantity:  25 tablet        Place 1 tablet (0.4 mg) under the tongue every 5 minutes as needed for chest pain If you are still having symptoms after 3 doses (15 minutes) call 911.   Refills:  0                Prescriptions were sent or printed at these locations (2 Prescriptions)                   Other Prescriptions                Printed at Department/Unit printer (2 of 2)         lisinopril (PRINIVIL/ZESTRIL) 10 MG tablet               pantoprazole (PROTONIX) 40 MG EC tablet                Procedures and tests performed during your visit     CBC with platelets differential    Comprehensive metabolic panel    EKG 12-lead, tracing only    Lipase    XR Chest 2 Views      Orders Needing Specimen Collection     None      Pending Results     No orders found from 7/17/2018 to 7/20/2018.            Pending Culture Results     No orders found from 7/17/2018 to 7/20/2018.            Pending Results Instructions     If you had any lab results that were not finalized at the time of your Discharge, you can call the ED Lab Result RN at [phone]. You will be contacted by this team for any positive Lab results or changes in treatment. The nurses are available 7 days a week from 10A to 6:30P.  You can leave a message 24 hours per day and they will return your call.        Test Results From Your Hospital Stay        7/19/2018 10:38 AM      Component Results     Component Value Ref Range & Units Status    WBC 4.3 4.0 - 11.0 10e9/L Final    RBC Count 5.12 4.4 - 5.9 10e12/L Final    Hemoglobin 16.3 13.3 - 17.7 g/dL Final    Hematocrit 47.1 40.0 - 53.0 % Final    MCV 92 78 - 100 fl Final    MCH 31.8 26.5 - 33.0 pg Final    MCHC 34.6 31.5 - 36.5 g/dL Final    RDW 13.6 10.0 - 15.0 % Final    Platelet Count  121 (L) 150 - 450 10e9/L Final    Diff Method Automated Method  Final    % Neutrophils 57.1 % Final    % Lymphocytes 24.9 % Final    % Monocytes 14.5 % Final    % Eosinophils 3.3 % Final    % Basophils 0.2 % Final    % Immature Granulocytes 0.0 % Final    Nucleated RBCs 0 0 /100 Final    Absolute Neutrophil 2.5 1.6 - 8.3 10e9/L Final    Absolute Lymphocytes 1.1 0.8 - 5.3 10e9/L Final    Absolute Monocytes 0.6 0.0 - 1.3 10e9/L Final    Absolute Eosinophils 0.1 0.0 - 0.7 10e9/L Final    Absolute Basophils 0.0 0.0 - 0.2 10e9/L Final    Abs Immature Granulocytes 0.0 0 - 0.4 10e9/L Final    Absolute Nucleated RBC 0.0  Final         7/19/2018 11:02 AM      Component Results     Component Value Ref Range & Units Status    Sodium 144 133 - 144 mmol/L Final    Potassium 3.3 (L) 3.4 - 5.3 mmol/L Final    Chloride 108 94 - 109 mmol/L Final    Carbon Dioxide 28 20 - 32 mmol/L Final    Anion Gap 8 3 - 14 mmol/L Final    Glucose 124 (H) 70 - 99 mg/dL Final    Urea Nitrogen 16 7 - 30 mg/dL Final    Creatinine 1.23 0.66 - 1.25 mg/dL Final    GFR Estimate 60 (L) >60 mL/min/1.7m2 Final    Non  GFR Calc    GFR Estimate If Black 72 >60 mL/min/1.7m2 Final    African American GFR Calc    Calcium 8.8 8.5 - 10.1 mg/dL Final    Bilirubin Total 0.7 0.2 - 1.3 mg/dL Final    Albumin 3.1 (L) 3.4 - 5.0 g/dL Final    Protein Total 6.8 6.8 - 8.8 g/dL Final    Alkaline Phosphatase 79 40 - 150 U/L Final    ALT 25 0 - 70 U/L Final    AST 25 0 - 45 U/L Final         7/19/2018 10:59 AM      Component Results     Component Value Ref Range & Units Status    Lipase 174 73 - 393 U/L Final         7/19/2018 11:18 AM      Narrative     CHEST TWO VIEWS   7/19/2018 10:34 AM     HISTORY: Follow up pneumonia/hemoptysis.     COMPARISON: 7/10/2018        Impression     IMPRESSION: The lungs are well-inflated and clear without  consolidation, edema, effusion, or pneumothorax. The cardiac  silhouette is normal in size and contour.    LEV HUNTLEY,  MD                Clinical Quality Measure: Blood Pressure Screening     Your blood pressure was checked while you were in the emergency department today. The last reading we obtained was  BP: (!) 168/101 . Please read the guidelines below about what these numbers mean and what you should do about them.  If your systolic blood pressure (the top number) is less than 120 and your diastolic blood pressure (the bottom number) is less than 80, then your blood pressure is normal. There is nothing more that you need to do about it.  If your systolic blood pressure (the top number) is 120-139 or your diastolic blood pressure (the bottom number) is 80-89, your blood pressure may be higher than it should be. You should have your blood pressure rechecked within a year by a primary care provider.  If your systolic blood pressure (the top number) is 140 or greater or your diastolic blood pressure (the bottom number) is 90 or greater, you may have high blood pressure. High blood pressure is treatable, but if left untreated over time it can put you at risk for heart attack, stroke, or kidney failure. You should have your blood pressure rechecked by a primary care provider within the next 4 weeks.  If your provider in the emergency department today gave you specific instructions to follow-up with your doctor or provider even sooner than that, you should follow that instruction and not wait for up to 4 weeks for your follow-up visit.        Thank you for choosing [facility]       Thank you for choosing [facility] for your care. Our goal is always to provide you with excellent care. Hearing back from our patients is one way we can continue to improve our services. Please take a few minutes to complete the written survey that you may receive in the mail after you visit with us. Thank you!        [employer] Information     [employer] lets you send messages to your doctor, view your test results, renew your prescriptions, schedule appointments  "and more. To sign up, go to [email or web address] . Click on \"Log in\" on the left side of the screen, which will take you to the Welcome page. Then click on \"Sign up Now\" on the right side of the page.     You will be asked to enter the access code listed below, as well as some personal information. Please follow the directions to create your username and password.     Your access code is: [email or web address]  Expires: 10/8/2018  5:14 AM     Your access code will  in 90 days. If you need help or a new code, please call your [facility] clinic or [phone].        Care EveryWhere ID     This is your Care EveryWhere ID. This could be used by other organizations to access your [facility] medical records  [id number]        Equal Access to Services     JASE KUMAR : Mars Robert, bryant yu, shelbie murray, hyacinth miller. So [facility] [phone].    ATENCIÓN: Si habla español, tiene a sahu disposición servicios gratuitos de asistencia lingüística. Llame al [phone].    We comply with applicable federal civil rights laws and [place] laws. We do not discriminate on the basis of race, color, national origin, age, disability, sex, sexual orientation, or gender identity.            After Visit Summary       This is your record. Keep this with you and show to your community pharmacist(s) and doctor(s) at your next visit.                  "

## 2018-07-20 NOTE — TELEPHONE ENCOUNTER
Patient reevaluated in Emergency Dept on 7/19/18.  From ED visit, provider recommended f/u with [facility]  Fungus culture pending and result was reviewed by the ED provider on 7/19/18    Close encounter.    Yony Singh RN  [facility] RN  Lung Nodule and ED Lab Result RN  Epic pool (ED late result f/u RN): [id number]  FV INCIDENTAL RADIOLOGY F/U NURSES: [id number]  # [phone]

## 2018-08-07 LAB
FUNGUS SPEC CULT: ABNORMAL
FUNGUS SPEC CULT: ABNORMAL
SPECIMEN SOURCE: ABNORMAL

## 2019-03-18 ENCOUNTER — HOSPITAL ENCOUNTER (EMERGENCY)
Facility: CLINIC | Age: 64
Discharge: HOME | End: 2019-03-18
Attending: EMERGENCY MEDICINE | Admitting: EMERGENCY MEDICINE

## 2019-03-18 ENCOUNTER — APPOINTMENT (OUTPATIENT)
Dept: GENERAL RADIOLOGY | Facility: CLINIC | Age: 64
End: 2019-03-18
Attending: EMERGENCY MEDICINE

## 2019-03-18 VITALS
DIASTOLIC BLOOD PRESSURE: 101 MMHG | OXYGEN SATURATION: 95 % | SYSTOLIC BLOOD PRESSURE: 159 MMHG | HEART RATE: 91 BPM | RESPIRATION RATE: 18 BRPM | WEIGHT: 186 LBS | HEIGHT: 68 IN | TEMPERATURE: 98.6 F | BODY MASS INDEX: 28.19 KG/M2

## 2019-03-18 DIAGNOSIS — R07.9 CHEST PAIN, UNSPECIFIED TYPE: ICD-10-CM

## 2019-03-18 DIAGNOSIS — F14.90 COCAINE USE: ICD-10-CM

## 2019-03-18 LAB
ALBUMIN SERPL-MCNC: 3.7 G/DL (ref 3.4–5)
ALP SERPL-CCNC: 91 U/L (ref 40–150)
ALT SERPL W P-5'-P-CCNC: 22 U/L (ref 0–70)
ANION GAP SERPL CALCULATED.3IONS-SCNC: 10 MMOL/L (ref 3–14)
AST SERPL W P-5'-P-CCNC: 19 U/L (ref 0–45)
BASOPHILS # BLD AUTO: 0 10E9/L (ref 0–0.2)
BASOPHILS NFR BLD AUTO: 0.2 %
BILIRUB SERPL-MCNC: 1.2 MG/DL (ref 0.2–1.3)
BUN SERPL-MCNC: 11 MG/DL (ref 7–30)
CALCIUM SERPL-MCNC: 8.7 MG/DL (ref 8.5–10.1)
CHLORIDE SERPL-SCNC: 108 MMOL/L (ref 94–109)
CO2 SERPL-SCNC: 24 MMOL/L (ref 20–32)
CREAT SERPL-MCNC: 1.14 MG/DL (ref 0.66–1.25)
D DIMER PPP FEU-MCNC: 0.4 UG/ML FEU (ref 0–0.5)
DIFFERENTIAL METHOD BLD: NORMAL
EOSINOPHIL # BLD AUTO: 0.1 10E9/L (ref 0–0.7)
EOSINOPHIL NFR BLD AUTO: 1.2 %
ERYTHROCYTE [DISTWIDTH] IN BLOOD BY AUTOMATED COUNT: 14 % (ref 10–15)
GFR SERPL CREATININE-BSD FRML MDRD: 68 ML/MIN/{1.73_M2}
GLUCOSE SERPL-MCNC: 67 MG/DL (ref 70–99)
HCT VFR BLD AUTO: 48.3 % (ref 40–53)
HGB BLD-MCNC: 16.9 G/DL (ref 13.3–17.7)
IMM GRANULOCYTES # BLD: 0 10E9/L (ref 0–0.4)
IMM GRANULOCYTES NFR BLD: 0.2 %
LIPASE SERPL-CCNC: 93 U/L (ref 73–393)
LYMPHOCYTES # BLD AUTO: 1.6 10E9/L (ref 0.8–5.3)
LYMPHOCYTES NFR BLD AUTO: 24.8 %
MCH RBC QN AUTO: 30.9 PG (ref 26.5–33)
MCHC RBC AUTO-ENTMCNC: 35 G/DL (ref 31.5–36.5)
MCV RBC AUTO: 88 FL (ref 78–100)
MONOCYTES # BLD AUTO: 0.5 10E9/L (ref 0–1.3)
MONOCYTES NFR BLD AUTO: 7.3 %
NEUTROPHILS # BLD AUTO: 4.3 10E9/L (ref 1.6–8.3)
NEUTROPHILS NFR BLD AUTO: 66.3 %
NRBC # BLD AUTO: 0 10*3/UL
NRBC BLD AUTO-RTO: 0 /100
PLATELET # BLD AUTO: 184 10E9/L (ref 150–450)
POTASSIUM SERPL-SCNC: 3.4 MMOL/L (ref 3.4–5.3)
PROT SERPL-MCNC: 7.8 G/DL (ref 6.8–8.8)
RBC # BLD AUTO: 5.47 10E12/L (ref 4.4–5.9)
SODIUM SERPL-SCNC: 142 MMOL/L (ref 133–144)
TROPONIN I SERPL-MCNC: <0.015 UG/L (ref 0–0.04)
WBC # BLD AUTO: 6.5 10E9/L (ref 4–11)

## 2019-03-18 PROCEDURE — 85025 COMPLETE CBC W/AUTO DIFF WBC: CPT | Performed by: EMERGENCY MEDICINE

## 2019-03-18 PROCEDURE — 99285 EMERGENCY DEPT VISIT HI MDM: CPT | Mod: 25

## 2019-03-18 PROCEDURE — 71046 X-RAY EXAM CHEST 2 VIEWS: CPT

## 2019-03-18 PROCEDURE — 85379 FIBRIN DEGRADATION QUANT: CPT | Performed by: EMERGENCY MEDICINE

## 2019-03-18 PROCEDURE — 25000128 H RX IP 250 OP 636: Performed by: EMERGENCY MEDICINE

## 2019-03-18 PROCEDURE — 80053 COMPREHEN METABOLIC PANEL: CPT | Performed by: EMERGENCY MEDICINE

## 2019-03-18 PROCEDURE — 93005 ELECTROCARDIOGRAM TRACING: CPT

## 2019-03-18 PROCEDURE — 84484 ASSAY OF TROPONIN QUANT: CPT | Performed by: EMERGENCY MEDICINE

## 2019-03-18 PROCEDURE — 96374 THER/PROPH/DIAG INJ IV PUSH: CPT

## 2019-03-18 PROCEDURE — 83690 ASSAY OF LIPASE: CPT | Performed by: EMERGENCY MEDICINE

## 2019-03-18 RX ORDER — KETOROLAC TROMETHAMINE 15 MG/ML
15 INJECTION, SOLUTION INTRAMUSCULAR; INTRAVENOUS ONCE
Status: COMPLETED | OUTPATIENT
Start: 2019-03-18 | End: 2019-03-18

## 2019-03-18 RX ADMIN — KETOROLAC TROMETHAMINE 15 MG: 15 INJECTION, SOLUTION INTRAMUSCULAR; INTRAVENOUS at 07:00

## 2019-03-18 ASSESSMENT — MIFFLIN-ST. JEOR: SCORE: 1618.19

## 2019-03-18 NOTE — ED AVS SNAPSHOT
Emergency Department  [address] [place] [address]  Phone:  [phone]  Fax:  [phone]                                    Abhilash Gonzalez   MRN: [id number]    Department:   Emergency Department   Date of Visit:  3/18/2019           After Visit Summary Signature Page    I have received my discharge instructions, and my questions have been answered. I have discussed any challenges I see with this plan with the nurse or doctor.    ..........................................................................................................................................  Patient/Patient Representative Signature      ..........................................................................................................................................  Patient Representative Print Name and Relationship to Patient    ..................................................               ................................................  Date                                   Time    ..........................................................................................................................................  Reviewed by Signature/Title    ...................................................              ..............................................  Date                                               Time          22EPIC Rev 08/18

## 2019-03-18 NOTE — ED PROVIDER NOTES
History     Chief Complaint:  Chest Pain    The history is provided by the patient.      Abhilash Gonzalez is a 63 year old male with a history of hypertension and polysubstance abuse who presents to the emergency department for evaluation of chest pain. For the past couple weeks, the patient endorses intermittent episodes of chest pain with some shortness of breath. He endorses a history of chest pain that he has been evaluated for, with a most recent stress test being unremarkable, full results below.  Patient denies any recent trauma to the chest that may have provoked the pain in addition to denying any leg pain or swelling. Of note, the patient endorses use of cocaine, most recently yesterday.     Cardiac/PE/DVT Risk Factors:  The patient has a history of hypertension. The patient reports no family history of heart disease. The patient denies any personal or familial history of PE, DVT, or clotting disorder. The patient reports no recent travel, surgery, or other immobilizations.     NM Lexiscan Stress Test: 4/9/18  Impression  1.  Myocardial perfusion imaging using single isotope technique demonstrated normal myocardial perfusion. There is no ischemia or infarction identified on this study.   2. Gated images demonstrated normal wall motion and normal left ventricular chamber size.  The left ventricular systolic function is normal, with an ejection fraction of 76%.  3. Compared to the prior study from 8/26/2015, there has been no change.     Allergies:  Codeine   Hydrocodone-Acetaminophen    Medications:    Albuterol  Aspirin 81 mg  Simvastatin  Amlodipine  Sudafed  Potassium Chloride  Nitroglycerin    Past Medical History:    Chronic Back Pain  Depression  DJD  Hypertension  Polysubstance Abuse: Cocaine, Marijuana, Crack  Latent TB  Migraines  Osteoarthritis    Past Surgical History:    Bronchoscopy  Colonoscopy  Right Hand Surgery   Mandible Surgery   Right Knee Surgery    Family History:   "  Hypertension  Cerebrovascular Disease    Social History:  Marital Status:  Single [1]  Tobacco Use: Former  Alcohol Use: Yes, socially 1 time a week  Drug Use: Crack, Cocaine, Marijuana    Review of Systems   All other systems reviewed and are negative.      Physical Exam     Patient Vitals for the past 24 hrs:   BP Temp Temp src Pulse Heart Rate Resp SpO2 Height Weight   03/18/19 0800 (!) 159/101 -- -- -- 93 18 95 % -- --   03/18/19 0745 (!) 169/111 -- -- 91 93 16 92 % -- --   03/18/19 0730 (!) 171/117 -- -- 94 93 16 94 % -- --   03/18/19 0700 145/85 -- -- 96 96 21 95 % -- --   03/18/19 0645 (!) 158/107 -- -- -- -- -- -- -- --   03/18/19 0642 (!) 182/97 98.6  F (37  C) Oral 98 -- 21 95 % 1.727 m (5' 8\") 84.4 kg (186 lb)       Physical Exam  General: nontoxic appearing male sitting upright in room 20  HENT: mucous membranes moist  CV: regular rate, regular rhythm, no lower extremity edema, no JVD, palpable symmetric radial pulses  Resp: clear throughout, normal effort, no crackles or wheezing  GI: abdomen soft and nontender, no guarding, negative Cobb's sign  MSK: no bony tenderness to chest  Skin: appropriately warm and dry, no erythema or vesicles to chest wall  Neuro: alert, clear speech, oriented   Psych: cooperative      Emergency Department Course   ECG:  Indication: Chest Pain  Time: 0643  Vent. Rate 91 bpm. TX interval 142. QRS duration 90. QT/QTc 410/504. P-R-T axis 67 49 74. Normal sinus rhythm. Possible left atrial enlargement. Cannot rule out anterior infarct, age undetermined. Prolonged QT. Abnormal ECG. Read time: 0651    Imaging:  XR Chest 2 views:   Mildly tortuous descending thoracic aorta is redemonstrated. Thin linear fibrosis or discoid atelectasis right lung base. Lungs otherwise clear. Heart and pulmonary vessels within normal limits. No evidence for pleural effusion.   As per radiology.     Laboratory:  CBC: WBC: 6.5, HGB: 16.9, PLT: 184  CMP: Glucose 67 (L), o/w WNL (Creatinine: " 1.14)  Lipase: 93  Troponin I: <0.015  D Dimer: 0.4    Interventions:  0700 Toradol 15 mg, IV    Emergency Department Course:  0649 Nursing notes and vitals reviewed. I performed an exam of the patient as documented above.     IV inserted. Medicine administered as documented above. Blood drawn. This was sent to the lab for further testing, results above.    The patient was sent for a chest xray while in the emergency department, findings above.    I performed electronic chart review in [facility].  The patient was placed on continuous cardiac and pulse ox monitoring.    EKG obtained in the ED, see results above.     0750 I rechecked the patient and discussed the results of his workup thus far.     Findings and plan explained to the patient. Patient discharged home with instructions regarding supportive care, medications, and reasons to return. The importance of close follow-up was reviewed.    I personally reviewed the laboratory results with the patient and answered all related questions prior to discharge.    Impression & Plan      Medical Decision Making:  The cause of his recent chest discomfort is unconfirmed despite testing as above.  Differential diagnosis includes acute coronary syndrome, but after many days of symptoms he has negative troponin and his EKG is benign.  Chest x-ray shows no infiltrate or pneumothorax.  Highly doubt aortic dissection.  Normal d-dimer makes pulmonary embolism extremely unlikely.  I do not think he requires hospitalization for further care at this time, though I did emphasize return precautions and close outpatient follow-up.  He is not clinically intoxicated at this time.  He reports he is content with this plan he was discharged home in improved condition.      Diagnosis:    ICD-10-CM    1. Chest pain, unspecified type R07.9    2. Cocaine use F14.90        Disposition:  discharged to home    Scribe Disclosure:  Ramsey LOONEY, am serving as a scribe on 3/18/2019 at 6:45 AM to  personally document services performed by Kedar Frank MD based on my observations and the provider's statements to me.     This record was created at least in part using electronic voice recognition software, so please excuse any typographical errors.    Ramsey Henry  3/18/2019    EMERGENCY DEPARTMENT       Kedar Frank MD  03/18/19 2014

## 2019-04-08 ENCOUNTER — APPOINTMENT (OUTPATIENT)
Dept: CT IMAGING | Facility: CLINIC | Age: 64
End: 2019-04-08
Attending: EMERGENCY MEDICINE

## 2019-04-08 ENCOUNTER — HOSPITAL ENCOUNTER (EMERGENCY)
Facility: CLINIC | Age: 64
Discharge: HOME | End: 2019-04-08
Attending: EMERGENCY MEDICINE | Admitting: EMERGENCY MEDICINE

## 2019-04-08 VITALS
OXYGEN SATURATION: 94 % | TEMPERATURE: 98.5 F | SYSTOLIC BLOOD PRESSURE: 156 MMHG | HEART RATE: 93 BPM | DIASTOLIC BLOOD PRESSURE: 103 MMHG | RESPIRATION RATE: 11 BRPM | BODY MASS INDEX: 27.28 KG/M2 | WEIGHT: 180 LBS | HEIGHT: 68 IN

## 2019-04-08 DIAGNOSIS — Z91.148 NONCOMPLIANCE WITH MEDICATION REGIMEN: ICD-10-CM

## 2019-04-08 DIAGNOSIS — F19.10 POLYSUBSTANCE ABUSE (H): ICD-10-CM

## 2019-04-08 DIAGNOSIS — G44.89 OTHER HEADACHE SYNDROME: ICD-10-CM

## 2019-04-08 DIAGNOSIS — I10 BENIGN ESSENTIAL HYPERTENSION: ICD-10-CM

## 2019-04-08 LAB
ANION GAP SERPL CALCULATED.3IONS-SCNC: 7 MMOL/L (ref 3–14)
BASOPHILS # BLD AUTO: 0 10E9/L (ref 0–0.2)
BASOPHILS NFR BLD AUTO: 0.2 %
BUN SERPL-MCNC: 11 MG/DL (ref 7–30)
CALCIUM SERPL-MCNC: 8.6 MG/DL (ref 8.5–10.1)
CHLORIDE SERPL-SCNC: 107 MMOL/L (ref 94–109)
CO2 SERPL-SCNC: 25 MMOL/L (ref 20–32)
CREAT SERPL-MCNC: 1.26 MG/DL (ref 0.66–1.25)
DIFFERENTIAL METHOD BLD: ABNORMAL
EOSINOPHIL # BLD AUTO: 0 10E9/L (ref 0–0.7)
EOSINOPHIL NFR BLD AUTO: 0.7 %
ERYTHROCYTE [DISTWIDTH] IN BLOOD BY AUTOMATED COUNT: 13.9 % (ref 10–15)
ETHANOL SERPL-MCNC: 0.04 G/DL
GFR SERPL CREATININE-BSD FRML MDRD: 60 ML/MIN/{1.73_M2}
GLUCOSE SERPL-MCNC: 84 MG/DL (ref 70–99)
HCT VFR BLD AUTO: 46.7 % (ref 40–53)
HGB BLD-MCNC: 16.4 G/DL (ref 13.3–17.7)
IMM GRANULOCYTES # BLD: 0 10E9/L (ref 0–0.4)
IMM GRANULOCYTES NFR BLD: 0 %
INTERPRETATION ECG - MUSE: NORMAL
LYMPHOCYTES # BLD AUTO: 1.2 10E9/L (ref 0.8–5.3)
LYMPHOCYTES NFR BLD AUTO: 27.6 %
MCH RBC QN AUTO: 31.6 PG (ref 26.5–33)
MCHC RBC AUTO-ENTMCNC: 35.1 G/DL (ref 31.5–36.5)
MCV RBC AUTO: 90 FL (ref 78–100)
MONOCYTES # BLD AUTO: 0.4 10E9/L (ref 0–1.3)
MONOCYTES NFR BLD AUTO: 9.9 %
NEUTROPHILS # BLD AUTO: 2.7 10E9/L (ref 1.6–8.3)
NEUTROPHILS NFR BLD AUTO: 61.6 %
NRBC # BLD AUTO: 0 10*3/UL
NRBC BLD AUTO-RTO: 0 /100
PLATELET # BLD AUTO: 145 10E9/L (ref 150–450)
POTASSIUM SERPL-SCNC: 3.4 MMOL/L (ref 3.4–5.3)
RBC # BLD AUTO: 5.19 10E12/L (ref 4.4–5.9)
SODIUM SERPL-SCNC: 139 MMOL/L (ref 133–144)
WBC # BLD AUTO: 4.4 10E9/L (ref 4–11)

## 2019-04-08 PROCEDURE — 99285 EMERGENCY DEPT VISIT HI MDM: CPT | Mod: 25

## 2019-04-08 PROCEDURE — 70450 CT HEAD/BRAIN W/O DYE: CPT

## 2019-04-08 PROCEDURE — 80048 BASIC METABOLIC PNL TOTAL CA: CPT | Performed by: EMERGENCY MEDICINE

## 2019-04-08 PROCEDURE — 25000128 H RX IP 250 OP 636: Performed by: EMERGENCY MEDICINE

## 2019-04-08 PROCEDURE — 80320 DRUG SCREEN QUANTALCOHOLS: CPT | Performed by: EMERGENCY MEDICINE

## 2019-04-08 PROCEDURE — 25000132 ZZH RX MED GY IP 250 OP 250 PS 637: Performed by: EMERGENCY MEDICINE

## 2019-04-08 PROCEDURE — 85025 COMPLETE CBC W/AUTO DIFF WBC: CPT | Performed by: EMERGENCY MEDICINE

## 2019-04-08 PROCEDURE — 96375 TX/PRO/DX INJ NEW DRUG ADDON: CPT

## 2019-04-08 PROCEDURE — 93005 ELECTROCARDIOGRAM TRACING: CPT

## 2019-04-08 PROCEDURE — 96374 THER/PROPH/DIAG INJ IV PUSH: CPT

## 2019-04-08 RX ORDER — DEXAMETHASONE SODIUM PHOSPHATE 10 MG/ML
10 INJECTION, SOLUTION INTRAMUSCULAR; INTRAVENOUS ONCE
Status: COMPLETED | OUTPATIENT
Start: 2019-04-08 | End: 2019-04-08

## 2019-04-08 RX ORDER — METOCLOPRAMIDE HYDROCHLORIDE 5 MG/ML
10 INJECTION INTRAMUSCULAR; INTRAVENOUS ONCE
Status: COMPLETED | OUTPATIENT
Start: 2019-04-08 | End: 2019-04-08

## 2019-04-08 RX ORDER — AMLODIPINE BESYLATE 5 MG/1
5 TABLET ORAL DAILY
Qty: 30 TABLET | Refills: 0 | Status: ON HOLD | OUTPATIENT
Start: 2019-04-08 | End: 2019-08-05

## 2019-04-08 RX ORDER — LISINOPRIL 10 MG/1
10 TABLET ORAL DAILY
Qty: 30 TABLET | Refills: 0 | Status: SHIPPED | OUTPATIENT
Start: 2019-04-08 | End: 2019-07-04

## 2019-04-08 RX ORDER — AMLODIPINE BESYLATE 5 MG/1
5 TABLET ORAL ONCE
Status: COMPLETED | OUTPATIENT
Start: 2019-04-08 | End: 2019-04-08

## 2019-04-08 RX ORDER — LISINOPRIL 10 MG/1
10 TABLET ORAL ONCE
Status: COMPLETED | OUTPATIENT
Start: 2019-04-08 | End: 2019-04-08

## 2019-04-08 RX ORDER — DIPHENHYDRAMINE HYDROCHLORIDE 50 MG/ML
25 INJECTION INTRAMUSCULAR; INTRAVENOUS ONCE
Status: COMPLETED | OUTPATIENT
Start: 2019-04-08 | End: 2019-04-08

## 2019-04-08 RX ADMIN — LISINOPRIL 10 MG: 10 TABLET ORAL at 01:53

## 2019-04-08 RX ADMIN — DEXAMETHASONE SODIUM PHOSPHATE 10 MG: 10 INJECTION, SOLUTION INTRAMUSCULAR; INTRAVENOUS at 01:47

## 2019-04-08 RX ADMIN — AMLODIPINE BESYLATE 5 MG: 5 TABLET ORAL at 01:53

## 2019-04-08 RX ADMIN — DIPHENHYDRAMINE HYDROCHLORIDE 25 MG: 50 INJECTION INTRAMUSCULAR; INTRAVENOUS at 01:45

## 2019-04-08 RX ADMIN — METOCLOPRAMIDE 10 MG: 5 INJECTION, SOLUTION INTRAMUSCULAR; INTRAVENOUS at 01:48

## 2019-04-08 ASSESSMENT — ENCOUNTER SYMPTOMS: HEADACHES: 1

## 2019-04-08 ASSESSMENT — MIFFLIN-ST. JEOR: SCORE: 1590.97

## 2019-04-08 NOTE — ED PROVIDER NOTES
History     Chief Complaint:  Headache     HPI   Abhilash Gonzalez is a 63 year old male with a history of depression, hypertension, polysubstance abuse, among others who presents with headache. The patient was seen here 3 weeks ago with chest pain. 4 days ago the patient noticed a headache before bed. The next morning he still had it and it has continued since. The patient has used aspirin and tylenol without alleviation. Due to concern, the patient has visited the ED for evaluation and treatment. Upon arrival, the patient states he has been off of his blood pressure medications for the last 4-5 days as he ran out. The patient also adds that he had vodka, beer, and marijuana today and cocaine yesterday. He states the cocaine and alcohol have not helped the headache. The patient states the headache is located more on the right side of his head than the left although he does have pain generally all over as well. The patient states he is concerned as his brother had a headache and was found to have a brain tumor in the past.     Allergies:  Codeine   Hydrocodone-acetaminophen     Medications:    albuterol  Ceftin  Prinivil  Nitrostat      Past Medical History:    Back pain  Depression  Degenerative joint disease  Hypertension   Polysubstance abuse  TB   OA    Past Surgical History:    Bronchoscopy  Colonoscopy  Hand surgery   Mandible surgery   Knee surgery    Family History:    Hypertension   Cerebrovascular disease     Social History:  Marital Status:  Single  Smoker:   Current   Smokeless:   never   Alcohol:   Yes   Drugs:   Yes, cocaine, marijuana     Review of Systems   Neurological: Positive for headaches.   All other systems reviewed and are negative.    Physical Exam     Patient Vitals for the past 24 hrs:   BP Temp Temp src Pulse Heart Rate Resp SpO2 Height Weight   04/08/19 0245 -- -- -- -- 83 11 94 % -- --   04/08/19 0241 (!) 156/103 -- -- 93 102 17 94 % -- --   04/08/19 0207 -- -- -- -- 103 18 93 % -- --  "  04/08/19 0114 (!) 156/118 -- -- 115 117 25 -- -- --   04/08/19 0101 (!) 158/100 98.5  F (36.9  C) Oral 122 -- 16 96 % 1.727 m (5' 8\") 81.6 kg (180 lb)     Physical Exam  Eye:  Pupils are equal, round, and reactive.  Extraocular movements intact.    ENT:  No rhinorrhea.  Moist mucus membranes.  Normal tongue and tonsil.    Cardiac:  Mildly tachycardic rate and regular rhythm.  No murmurs, gallops, or rubs.    Pulmonary:  Clear to auscultation bilaterally.  No wheezes, rales, or rhonchi.    Abdomen:  Positive bowel sounds.  Abdomen is soft and non-distended, without focal tenderness.    Musculoskeletal:  Normal movement of all extremities without evidence for deficit.    Skin:  Warm and dry without rashes.    Neurologic:  CN II - XII intact.  5/5 strength in all extremities.  Normal sensation throughout.  Normal finger to nose and heel to shin.  2+ patellar reflexes.  Normal gait.    Psychiatric:  Normal affect with appropriate interaction with examiner. The patient is mildly intoxicated.    Emergency Department Course   ECG:  Indication: headache   Time: 0118  Vent. Rate 114 bpm. IN interval 142. QRS duration 84. QT/QTc 360/496. P-R-T axis 61 0 76. Sinus tachycardia. Minimal voltage criteria for LVH, may be normal variant. Borderline ECG. Read time: 0119    Imaging:  Radiographic findings were communicated with the patient who voiced understanding of the findings.    CT Head without contrast:   No evidence of acute intracranial abnormality. as per radiology.    Laboratory:  BMP: creatinine 1.26, GFR 60, o/w WNL (Creatinine: 1.26)  CBC: WBC: 4.4, HGB: 16.4, PLT: 145  Alcohol ethyl: 0.04    Interventions:  0145: Benadryl 25 mg IV  0147: Decadron 10 mg IV  0148: Reglan 10 mg IV  0153: Lisinopril 10 mg PO  0153: Amlodipine 5 mg PO    Emergency Department Course:  (0132) I performed an exam of the patient as documented above.     (0230) I rechecked the patient and discussed the results of their workup thus far. "      Findings and plan explained. Patient discharged home with instructions regarding supportive care, medications, and reasons to return. The importance of close follow-up was reviewed.     I personally reviewed the laboratory results with them and answered all related questions prior to discharge.    Impression & Plan    Medical Decision Making:  This 63-year-old man with a long-standing history of polysubstance abuse and hypertension with medication noncompliance presents to us primarily with concerns of a headache tonight.  The patient notes that he is struggled with migraines on and off through the years.  He notes that these headaches will typically go away with over-the-counter pain relievers and rest.  This headache began in a very benign fashion approximately 3 days ago for which he took medication before going to bed, expecting it to be gone in the morning.  However, he notes that it was not improved and is continue to plague him.  He describes an aching sensation to the right side of his head behind the right eye.  He otherwise denies any new neurologic deficits with this.  Because his brother was diagnosed with a brain tumor, the patient is very concerned that this may be the issue for him as well, thus resulting in him presenting here today.    The patient underwent a very normal neurologic exam.  I did elect to scan his head out of an abundance of caution with his concerns for a tumor and the study is negative.  His headache is improved with the above interventions.  I do not believe that this is likely to represent a subarachnoid hemorrhage, meningitis, or other more nefarious cause of headache.  Plan will be for discharge home with reassurance and close outpatient follow-up.    The patient has a few other issues that I would like to address.  He has long-standing hypertension and he always notes that he is either out of his medications or has not picked them up recently from the pharmacy.  He notes  he has been off of his meds for several days and his blood pressure is elevated here.  His screening labs are unremarkable and I do not believe that there is any reason for admission or IV meds.  I did give him a dose of his oral meds and I will discharge him home with another 30-day supply, strongly urging him to follow-up with his doctor.  We also discussed his polysubstance abuse and the likelihood that the cocaine is causing long-term issues.  He did present here mildly tachycardic though this improved with fluids but I have to wonder if some of this is due to his polysubstance abuse along with his alcohol drinking tonight.  Finally, he commented to the triage staff about bilateral arm numbness and also described having some neck discomfort, though freely admits these issues have been going on for many many months and I do not believe that these concerns merit a emergency evaluation today as they are very clearly chronic issues.    Diagnosis:    ICD-10-CM    1. Other headache syndrome G44.89    2. Benign essential hypertension I10    3. Polysubstance abuse (H) F19.10    4. Noncompliance with medication regimen Z91.14      Disposition:  discharged to home with Norvasc    Discharge Medications:     Medication List      Started    amLODIPine 5 MG tablet  Commonly known as:  NORVASC  5 mg, Oral, DAILY          Scribe Disclosure:  I, Ken Sorensen, am serving as a scribe on 4/8/2019 at 1:32 AM to personally document services performed by Trierweiler, Chad A, MD based on my observations and the provider's statements to me.     Ken Sorensen  4/8/2019    EMERGENCY DEPARTMENT       Trierweiler, Chad A, MD  04/08/19 0608

## 2019-04-08 NOTE — ED AVS SNAPSHOT
Emergency Department  [address] [place] [address]  Phone:  [phone]  Fax:  [phone]                                    Abhilash Gonzalez   MRN: [id number]    Department:   Emergency Department   Date of Visit:  4/8/2019           After Visit Summary Signature Page    I have received my discharge instructions, and my questions have been answered. I have discussed any challenges I see with this plan with the nurse or doctor.    ..........................................................................................................................................  Patient/Patient Representative Signature      ..........................................................................................................................................  Patient Representative Print Name and Relationship to Patient    ..................................................               ................................................  Date                                   Time    ..........................................................................................................................................  Reviewed by Signature/Title    ...................................................              ..............................................  Date                                               Time          22EPIC Rev 08/18

## 2019-04-08 NOTE — DISCHARGE INSTRUCTIONS
Discharge Instructions  Headache    You were seen today for a headache. Headaches may be caused by many different things such as muscle tension, sinus inflammation, anxiety and stress, having too little sleep, too much alcohol, some medical conditions or injury. You may have a migraine, which is caused by changes in the blood vessels in your head.  At this time your provider does not find that your headache is a sign of anything dangerous or life-threatening.  However, sometimes the signs of serious illness do not show up right away.      Generally, every Emergency Department visit should have a follow-up clinic visit with either a primary or a specialty clinic/provider. Please follow-up as instructed by your emergency provider today.    Return to the Emergency Department if:  You get a new fever of 100.4 F or higher.  Your headache gets much worse.  You get a stiff neck with your headache.  You get a new headache that is significantly different or worse than headaches you have had before.  You are vomiting (throwing up) and cannot keep food or water down.  You have blurry or double vision or other problems with your eyes.  You have a new weakness on one side of your body.  You have difficulty with balance which is new.  You or your family thinks you are confused.  You have a seizure.    What can I do to help myself?  Pain medications - You may take a pain medication such as Tylenol  (acetaminophen), Advil , Motrin  (ibuprofen) or Aleve  (naproxen).  Take a pain reliever as soon as you notice symptoms.  Starting medications as soon as you start to have symptoms may lessen the amount of pain you have.  Relaxing in a quiet, dark room may help.  Get enough sleep and eat meals regularly.  You may need to watch for certain foods or other things which may trigger your headaches.  Keeping a journal of your headaches and possible triggers may help you and your primary provider to identify things which you should avoid which  may be causing your headaches.  If you were given a prescription for medicine here today, be sure to read all of the information (including the package insert) that comes with your prescription.  This will include important information about the medicine, its side effects, and any warnings that you need to know about.  The pharmacist who fills the prescription can provide more information and answer questions you may have about the medicine.  If you have questions or concerns that the pharmacist cannot address, please call or return to the Emergency Department.   Remember that you can always come back to the Emergency Department if you are not able to see your regular provider in the amount of time listed above, if you get any new symptoms, or if there is anything that worries you.    Discharge Instructions  Hypertension - High Blood Pressure    During you visit to the Emergency Department, your blood pressure was higher than the recommended blood pressure.  This may be related to stress, pain, medication or other temporary conditions. In these cases, your blood pressure may return to normal on its own. If you have a history of high blood pressure, you may need to have your provider adjust your medications. Sometimes, your high measurement here may indicate that you have developed high blood pressure that will stay high unless it is treated. As a general rule, high blood pressure causes problems over years rather than days, weeks, or months. So, while it is important to treat blood pressure, it is rarely important to treat blood pressure immediately. Occasionally we will begin a medication in the Emergency Department; more often we will recommend close follow-up for medications with a primary doctor/clinic.    Generally, every Emergency Department visit should have a follow-up clinic visit with either a primary or a specialty clinic/provider. Please follow-up as instructed by your emergency provider today.    Return  to the Emergency Department if you start to have:  A severe headache.  Chest pain.  Shortness of breath.  Weakness or numbness that affects one part of the body.  Confusion.  Vision changes.  Significant swelling of legs and/or eyes.  A reaction to any medication started in the Emergency Department.    What can I do to help myself?  Avoid alcohol.  Take any blood pressure medicine that you are prescribed.  Get a good night?s sleep.  Lower your salt intake.  Exercise.  Lose weight.  Manage stress.  See your doctor regularly    If blood pressure medication was started in the Emergency Department:  The medicine may not have an immediate effect. The body and brain determine what blood pressure you have. The medicine?s job is to retrain the body?s ?thermostat? to a lower blood pressure.  You will need to follow up with your provider to see how this medicine is working for you.  If you were given a prescription for medicine here today, be sure to read all of the information (including the package insert) that comes with your prescription.  This will include important information about the medicine, its side effects, and any warnings that you need to know about.  The pharmacist who fills the prescription can provide more information and answer questions you may have about the medicine.  If you have questions or concerns that the pharmacist cannot address, please call or return to the Emergency Department.   Remember that you can always come back to the Emergency Department if you are not able to see your regular provider in the amount of time listed above, if you get any new symptoms, or if there is anything that worries you.

## 2019-04-09 ENCOUNTER — HOSPITAL ENCOUNTER (EMERGENCY)
Facility: CLINIC | Age: 64
Discharge: HOME | End: 2019-04-09
Attending: EMERGENCY MEDICINE | Admitting: EMERGENCY MEDICINE
Payer: MEDICAID

## 2019-04-09 VITALS
RESPIRATION RATE: 16 BRPM | WEIGHT: 180 LBS | BODY MASS INDEX: 27.28 KG/M2 | HEART RATE: 82 BPM | OXYGEN SATURATION: 99 % | SYSTOLIC BLOOD PRESSURE: 135 MMHG | TEMPERATURE: 97.6 F | HEIGHT: 68 IN | DIASTOLIC BLOOD PRESSURE: 86 MMHG

## 2019-04-09 DIAGNOSIS — G43.809 OTHER MIGRAINE WITHOUT STATUS MIGRAINOSUS, NOT INTRACTABLE: ICD-10-CM

## 2019-04-09 PROCEDURE — 99284 EMERGENCY DEPT VISIT MOD MDM: CPT | Mod: 25 | Performed by: EMERGENCY MEDICINE

## 2019-04-09 PROCEDURE — 99284 EMERGENCY DEPT VISIT MOD MDM: CPT | Mod: Z6 | Performed by: EMERGENCY MEDICINE

## 2019-04-09 PROCEDURE — 96375 TX/PRO/DX INJ NEW DRUG ADDON: CPT | Performed by: EMERGENCY MEDICINE

## 2019-04-09 PROCEDURE — 25800030 ZZH RX IP 258 OP 636: Performed by: EMERGENCY MEDICINE

## 2019-04-09 PROCEDURE — 96361 HYDRATE IV INFUSION ADD-ON: CPT | Performed by: EMERGENCY MEDICINE

## 2019-04-09 PROCEDURE — 96374 THER/PROPH/DIAG INJ IV PUSH: CPT | Performed by: EMERGENCY MEDICINE

## 2019-04-09 PROCEDURE — 25000128 H RX IP 250 OP 636: Performed by: EMERGENCY MEDICINE

## 2019-04-09 RX ORDER — SODIUM CHLORIDE 9 MG/ML
1000 INJECTION, SOLUTION INTRAVENOUS CONTINUOUS
Status: DISCONTINUED | OUTPATIENT
Start: 2019-04-09 | End: 2019-04-09 | Stop reason: HOSPADM

## 2019-04-09 RX ORDER — DIPHENHYDRAMINE HYDROCHLORIDE 50 MG/ML
25 INJECTION INTRAMUSCULAR; INTRAVENOUS ONCE
Status: COMPLETED | OUTPATIENT
Start: 2019-04-09 | End: 2019-04-09

## 2019-04-09 RX ORDER — IBUPROFEN 200 MG
400 TABLET ORAL EVERY 8 HOURS PRN
Qty: 60 TABLET | Refills: 0 | Status: SHIPPED | OUTPATIENT
Start: 2019-04-09 | End: 2019-07-28

## 2019-04-09 RX ORDER — KETOROLAC TROMETHAMINE 30 MG/ML
30 INJECTION, SOLUTION INTRAMUSCULAR; INTRAVENOUS ONCE
Status: COMPLETED | OUTPATIENT
Start: 2019-04-09 | End: 2019-04-09

## 2019-04-09 RX ADMIN — PROCHLORPERAZINE EDISYLATE 10 MG: 5 INJECTION INTRAMUSCULAR; INTRAVENOUS at 03:24

## 2019-04-09 RX ADMIN — SODIUM CHLORIDE 1000 ML: 9 INJECTION, SOLUTION INTRAVENOUS at 03:59

## 2019-04-09 RX ADMIN — KETOROLAC TROMETHAMINE 30 MG: 30 INJECTION, SOLUTION INTRAMUSCULAR at 03:24

## 2019-04-09 RX ADMIN — SODIUM CHLORIDE 1000 ML: 9 INJECTION, SOLUTION INTRAVENOUS at 03:23

## 2019-04-09 RX ADMIN — DIPHENHYDRAMINE HYDROCHLORIDE 25 MG: 50 INJECTION INTRAMUSCULAR; INTRAVENOUS at 03:24

## 2019-04-09 ASSESSMENT — ENCOUNTER SYMPTOMS
CHILLS: 0
DIARRHEA: 0
VOMITING: 0
LIGHT-HEADEDNESS: 0
NAUSEA: 0
SHORTNESS OF BREATH: 0
HEADACHES: 1
NECK PAIN: 0
WEAKNESS: 0
BACK PAIN: 0
NUMBNESS: 0
FEVER: 0
NECK STIFFNESS: 0
ABDOMINAL PAIN: 0
SEIZURES: 0
PHOTOPHOBIA: 0

## 2019-04-09 ASSESSMENT — MIFFLIN-ST. JEOR: SCORE: 1590.97

## 2019-04-09 ASSESSMENT — PAIN DESCRIPTION - DESCRIPTORS
DESCRIPTORS: HEADACHE
DESCRIPTORS: HEADACHE

## 2019-04-09 NOTE — ED PROVIDER NOTES
"  History     Chief Complaint   Patient presents with     Headache     HPI  Abhilash Gonzalez is a 63 year old male who has a past medical history of migraine headaches, osteoporosis, pulmonary tuberculosis presenting with headache.  He has been having migraines on and off since Friday.  These are his typical migraines but typically they go away with aspirin.  He has not seen a doctor for this.  Denies nausea, vomiting, visual changes.  Denies numbness, tingling or weakness.  Pain is in the right side of the back of his head.  No neck pain or stiffness.  Patient has not been ambulating without difficulty.  Has not loss consciousness.  Pain was not sudden onset.    I have reviewed the Medications, Allergies, Past Medical and Surgical History, and Social History in the Epic system.    Review of Systems   Constitutional: Negative for chills and fever.   Eyes: Negative for photophobia and visual disturbance.   Respiratory: Negative for shortness of breath.    Cardiovascular: Negative for chest pain.   Gastrointestinal: Negative for abdominal pain, diarrhea, nausea and vomiting.   Musculoskeletal: Negative for back pain, neck pain and neck stiffness.   Skin: Negative for rash.   Neurological: Positive for headaches. Negative for seizures, syncope, weakness, light-headedness and numbness.       Physical Exam   BP: 151/80  Pulse: 89  Temp: 97.6  F (36.4  C)  Resp: 16  Height: 172.7 cm (5' 8\")  Weight: 81.6 kg (180 lb)  SpO2: 99 %      Physical Exam  Physical Exam   Constitutional: oriented to person, place, and time. appears well-developed and well-nourished.   HENT:   Head: Normocephalic and atraumatic.   Neck: Normal range of motion. Supple neck.  Pulmonary/Chest: Effort normal. No respiratory distress.   Cardiac: No murmurs, rubs, gallops. RRR.  Abdominal: Abdomen soft, nontender, nondistended. No rebound tenderness.  MSK: Long bones without deformity or evidence of trauma  Neurological: alert and oriented to person, " place, and time. Gait intact.  Finger nose finger intact.  Cranial nerves II through XII intact.  Strength is 5 out of 5 and symmetric in upper and lower extremities.  Sensation grossly intact in all extremities.  Skin: Skin is warm and dry.   Psychiatric:  normal mood and affect.  behavior is normal. Thought content normal.     ED Course        Procedures         Labs Ordered and Resulted from Time of ED Arrival Up to the Time of Departure from the ED - No data to display         Assessments & Plan (with Medical Decision Making)   MDM  Patient presenting with migraine that is typical for his current symptoms.  He is given Toradol, fluids and Compazine.  No suspicion for meningitis or encephalitis due to lack of neck stiffness or infectious symptoms.  Unlikely intracranial hemorrhage due to typical headache without sudden onset or trauma.    Re eval: Headache is gone and patient is able to sleep.  Patient will be discharged.  He is planning on following up with primary care provider today.  Patient otherwise appears well and does not need further imaging or testing in the emergency department.  He will return if symptoms are worsening.  I have reviewed the nursing notes.    I have reviewed the findings, diagnosis, plan and need for follow up with the patient.       Medication List      There are no discharge medications for this visit.         Final diagnoses:   Other migraine without status migrainosus, not intractable       4/9/2019   [facility], [place], EMERGENCY DEPARTMENT     Dre Henry MD  04/09/19 9128

## 2019-04-09 NOTE — ED AVS SNAPSHOT
[facility], [place], Emergency Department  [address] [place] [address]  Phone:  [phone]                                    Abhilash Gonzalez   MRN: [id number]    Department:  [facility], Emergency Department   Date of Visit:  4/9/2019           After Visit Summary Signature Page    I have received my discharge instructions, and my questions have been answered. I have discussed any challenges I see with this plan with the nurse or doctor.    ..........................................................................................................................................  Patient/Patient Representative Signature      ..........................................................................................................................................  Patient Representative Print Name and Relationship to Patient    ..................................................               ................................................  Date                                   Time    ..........................................................................................................................................  Reviewed by Signature/Title    ...................................................              ..............................................  Date                                               Time          22EPIC Rev 08/18

## 2019-04-09 NOTE — DISCHARGE INSTRUCTIONS
Follow-up with your primary doctor as you plan today.    If Abhilash has discomfort from fever or other pain, he can have:        Ibuprofen (Advil, Motrin) every 6 hours as needed. His/her dose is:    1 tab of the 400 mg prescription tabs                                                                  (40-60 kg/ lb)

## 2019-04-10 NOTE — PROGRESS NOTES
Writer in chart for post discharge call. Unable to reach the patient at this time.    Wendi Parker RN on 4/10/2019 at 2:37 PM

## 2019-06-26 ENCOUNTER — HOSPITAL ENCOUNTER (EMERGENCY)
Facility: CLINIC | Age: 64
Discharge: HOME | End: 2019-06-27
Attending: EMERGENCY MEDICINE | Admitting: EMERGENCY MEDICINE
Payer: MEDICAID

## 2019-06-26 DIAGNOSIS — G43.909 MIGRAINE WITHOUT STATUS MIGRAINOSUS, NOT INTRACTABLE, UNSPECIFIED MIGRAINE TYPE: ICD-10-CM

## 2019-06-26 DIAGNOSIS — I10 ESSENTIAL HYPERTENSION: ICD-10-CM

## 2019-06-26 PROCEDURE — 99284 EMERGENCY DEPT VISIT MOD MDM: CPT | Mod: 25

## 2019-06-26 PROCEDURE — 25000128 H RX IP 250 OP 636: Performed by: EMERGENCY MEDICINE

## 2019-06-26 PROCEDURE — 25000132 ZZH RX MED GY IP 250 OP 250 PS 637: Performed by: EMERGENCY MEDICINE

## 2019-06-26 PROCEDURE — 96361 HYDRATE IV INFUSION ADD-ON: CPT

## 2019-06-26 PROCEDURE — 96375 TX/PRO/DX INJ NEW DRUG ADDON: CPT

## 2019-06-26 PROCEDURE — 96374 THER/PROPH/DIAG INJ IV PUSH: CPT

## 2019-06-26 RX ORDER — LISINOPRIL 10 MG/1
10 TABLET ORAL DAILY
Qty: 30 TABLET | Refills: 0 | Status: SHIPPED | OUTPATIENT
Start: 2019-06-26 | End: 2019-07-20

## 2019-06-26 RX ORDER — AMLODIPINE BESYLATE 5 MG/1
5 TABLET ORAL DAILY
Qty: 5 TABLET | Refills: 0 | Status: ON HOLD | OUTPATIENT
Start: 2019-06-26 | End: 2019-08-05

## 2019-06-26 RX ORDER — LISINOPRIL 10 MG/1
10 TABLET ORAL ONCE
Status: COMPLETED | OUTPATIENT
Start: 2019-06-26 | End: 2019-06-26

## 2019-06-26 RX ORDER — DIHYDROERGOTAMINE MESYLATE 1 MG/ML
1 INJECTION, SOLUTION INTRAMUSCULAR; INTRAVENOUS; SUBCUTANEOUS ONCE
Status: COMPLETED | OUTPATIENT
Start: 2019-06-26 | End: 2019-06-26

## 2019-06-26 RX ORDER — AMLODIPINE BESYLATE 5 MG/1
5 TABLET ORAL DAILY
Status: DISCONTINUED | OUTPATIENT
Start: 2019-06-26 | End: 2019-06-27 | Stop reason: HOSPADM

## 2019-06-26 RX ORDER — KETOROLAC TROMETHAMINE 15 MG/ML
15 INJECTION, SOLUTION INTRAMUSCULAR; INTRAVENOUS ONCE
Status: COMPLETED | OUTPATIENT
Start: 2019-06-26 | End: 2019-06-26

## 2019-06-26 RX ORDER — DIPHENHYDRAMINE HYDROCHLORIDE 50 MG/ML
50 INJECTION INTRAMUSCULAR; INTRAVENOUS ONCE
Status: COMPLETED | OUTPATIENT
Start: 2019-06-26 | End: 2019-06-26

## 2019-06-26 RX ADMIN — SODIUM CHLORIDE 1000 ML: 9 INJECTION, SOLUTION INTRAVENOUS at 22:54

## 2019-06-26 RX ADMIN — DIPHENHYDRAMINE HYDROCHLORIDE 50 MG: 50 INJECTION, SOLUTION INTRAMUSCULAR; INTRAVENOUS at 22:30

## 2019-06-26 RX ADMIN — PROCHLORPERAZINE EDISYLATE 10 MG: 5 INJECTION INTRAMUSCULAR; INTRAVENOUS at 22:30

## 2019-06-26 RX ADMIN — LISINOPRIL 10 MG: 10 TABLET ORAL at 23:52

## 2019-06-26 RX ADMIN — AMLODIPINE BESYLATE 5 MG: 5 TABLET ORAL at 23:52

## 2019-06-26 RX ADMIN — DIHYDROERGOTAMINE MESYLATE 1 MG: 1 INJECTION, SOLUTION INTRAMUSCULAR; INTRAVENOUS; SUBCUTANEOUS at 22:52

## 2019-06-26 RX ADMIN — KETOROLAC TROMETHAMINE 15 MG: 15 INJECTION, SOLUTION INTRAMUSCULAR; INTRAVENOUS at 22:30

## 2019-06-26 ASSESSMENT — ENCOUNTER SYMPTOMS: HEADACHES: 1

## 2019-06-26 ASSESSMENT — MIFFLIN-ST. JEOR: SCORE: 1590.97

## 2019-06-26 NOTE — ED AVS SNAPSHOT
Emergency Department  [address] [place] [address]  Phone:  [phone]  Fax:  [phone]                                    Abhilash Gonzalez   MRN: [id number]    Department:   Emergency Department   Date of Visit:  6/26/2019           After Visit Summary Signature Page    I have received my discharge instructions, and my questions have been answered. I have discussed any challenges I see with this plan with the nurse or doctor.    ..........................................................................................................................................  Patient/Patient Representative Signature      ..........................................................................................................................................  Patient Representative Print Name and Relationship to Patient    ..................................................               ................................................  Date                                   Time    ..........................................................................................................................................  Reviewed by Signature/Title    ...................................................              ..............................................  Date                                               Time          22EPIC Rev 08/18

## 2019-06-27 VITALS
OXYGEN SATURATION: 97 % | BODY MASS INDEX: 27.28 KG/M2 | SYSTOLIC BLOOD PRESSURE: 204 MMHG | HEIGHT: 68 IN | HEART RATE: 103 BPM | WEIGHT: 180 LBS | RESPIRATION RATE: 18 BRPM | DIASTOLIC BLOOD PRESSURE: 175 MMHG | TEMPERATURE: 98.7 F

## 2019-06-27 NOTE — DISCHARGE INSTRUCTIONS
Start back on your Norvasc and Prinivil.  Set an appointment up with your doctor for recheck of your blood pressure and your headaches next week.  Tylenol or ibuprofen as needed in the future.

## 2019-06-27 NOTE — ED PROVIDER NOTES
"  History     Chief Complaint:  Headache    HPI   Abhilash Gonzalez is a 63 year old male with a history of migraines, hypertension, and chronic back pain who presents with headache. The patient notes a frontal headache with mild photophobia and nausea that feels like his migraines. The patient took Asprin without relief. The patient does not have migraine medication though he has seen a physician for his migraines. The patient is not sure what causes his migraines. The patient notes the his last migraine was several months ago. The patient also noted some left flank pain, but he states that this is due to his chronic back pain because of which he currently does not have a job. The patient denies any trauma.     Allergies:  Codeine  Hydrocodone-acetaminophen     Medications:    No daily medication.     Past Medical History:    Back pain  Depression  Degenerative joint disease  Hypertension  Polysubstance abuse   Tuberculosis   Cocaine abuse   Tobacco abuse disorder  Migraine headache    Past Surgical History:    Right palm surgery   Mandible surgery   XR Knee surgery hector right     Family History:    Mother: hypertension  Brother: cerebrovascular     Social History:  Smoking Status: Current Every Day Smoker   Smokeless Tobacco: Never Used  Alcohol Use: Positive  Drug use: yes   Cocaine, marijuana   Marital Status:  Single     Review of Systems   Neurological: Positive for headaches.   All other systems reviewed and are negative.    Physical Exam     Patient Vitals for the past 24 hrs:   BP Temp Temp src Heart Rate Resp SpO2 Height Weight   06/26/19 2300 (!) 183/121 -- -- -- -- 98 % -- --   06/26/19 2145 (!) 182/123 -- -- -- -- 96 % -- --   06/26/19 2133 (!) 194/123 98.7  F (37.1  C) Temporal 105 18 95 % 1.727 m (5' 8\") 81.6 kg (180 lb)     Physical Exam  Nursing note and vitals reviewed.    Constitutional:  Appears well-developed and well-nourished, uncomfortable.    HENT:    Nose normal.  No discharge. "      Oropharynx is clear and moist.  Eyes:    Conjunctivae are normal without injection.      Pupils are equal, round, and reactive to light.   Cardiovascular:  Normal rate, regular rhythm with normal S1 and S2.      Normal heart sounds and peripheral pulses 2+ and equal.       No murmur or janay.  Pulmonary:  Effort normal and breath sounds clear to auscultation bilaterally. No respiratory distress.  No stridor.     No wheezes. No rales.     GI:    Soft. No distension and no mass. No tenderness.      No rebound and no guarding. No flank pain.  No HSM.  Musculoskeletal:  Normal range of motion. No extremity deformity.                                      Neck supple, no midline spinal tenderness.   Neurological:   Alert and oriented. No cranial nerve deficit, no facial droop.     Exhibits good muscle tone. Coordination normal.      GCS eye subscore is 4. GCS verbal subscore is 5.      GCS motor subscore is 6.   Skin:    Skin is warm and dry. No rash noted. No diaphoresis.      No erythema. No pallor.  No lesions.  Psychiatric:   Behavior is normal. Appropriate mood and affect.     Judgment and thought content normal.   Emergency Department Course   Interventions:  2230 Benadryl 50 mg IV  2230 Toradol 15 mg IV  2230 Compazine 10 mg IV  2252 Dihydroergotamine 1 mg IV  2254 NS 1000 mL IV  2352 Lisinopril 10 mg Oral  2352 Amlodipine 5 mg Oral    Emergency Department Course:  Nursing notes and vitals reviewed.    2200 I performed an exam of the patient as documented above.     2328 The patient feels improved. The patient's blood pressure was high here in the ED, and the patient reports her ran out of his Norvasc and Lisinopril and has not refilled them. Prior to discharge, I personally answered all related questions . Patient voiced understanding.     Impression & Plan      Medical Decision Making:  Abhilash Gonzalez is a 63 year old male who presents to the emergency department today for evaluation of headache  consistent with his normal migraine.  Evaluation in the emergency department has been negative. The patient has not had any fever, weakness, numbness, paresthesia, neck stiffness or confusion. Meningitis, subarachnoid hemorrhage, CNS tumor, and stroke were considered as part of the differential, and considered unlikely. The pain has improved with 50 mg Benadryl IV,  15 mg Toradol, 10 mg IV Compazine, and 1 mg of Dihydroergotamine with good relief of his headache. The patient was noted to have persistent hypertension in the Emergency department, and when questioned reported that he has run out of his Lisinopril and Norvasc prescriptions and had not refilled them. They were provided here prior to discharge. The patient has been instructed to follow-up with his primary physician within a week for both his hypertension and headaches, and to return to the ED with new or worse symptoms, such as worsening headache, fever, vomiting, focal neurologic symptoms.    Start back on your Norvasc and Prinivil.  Set an appointment up with your doctor for recheck of your blood pressure and your headaches next week.  Tylenol or ibuprofen as needed in the future.      Diagnosis:    ICD-10-CM    1. Migraine without status migrainosus, not intractable, unspecified migraine type G43.909    2. Essential hypertension I10         Disposition:   The patient is discharged to home.    Discharge Medications:     CONTINUE these medicines which may have CHANGED, or have new prescriptions. If we are uncertain of the size of tablets/capsules you have at home, strength may be listed as something that might have changed.      Dose / Directions   * amLODIPine 5 MG tablet  Commonly known as:  NORVASC  This may have changed:  Another medication with the same name was added. Make sure you understand how and when to take each.      Dose:  5 mg  Take 1 tablet (5 mg) by mouth daily  Quantity:  30 tablet  Refills:  0     * amLODIPine 5 MG tablet  Commonly  known as:  NORVASC  This may have changed:  You were already taking a medication with the same name, and this prescription was added. Make sure you understand how and when to take each.      Dose:  5 mg  Take 1 tablet (5 mg) by mouth daily  Quantity:  5 tablet  Refills:  0     * lisinopril 10 MG tablet  Commonly known as:  PRINIVIL/ZESTRIL  This may have changed:  Another medication with the same name was added. Make sure you understand how and when to take each.      Dose:  10 mg  Take 1 tablet (10 mg) by mouth daily  Quantity:  30 tablet  Refills:  0     * lisinopril 10 MG tablet  Commonly known as:  PRINIVIL/ZESTRIL  This may have changed:  You were already taking a medication with the same name, and this prescription was added. Make sure you understand how and when to take each.      Dose:  10 mg  Take 1 tablet (10 mg) by mouth daily  Quantity:  30 tablet  Refills:  0         * This list has 4 medication(s) that are the same as other medications prescribed for you. Read the directions carefully, and ask your doctor or other care provider to review them with you.               Where to get your medicines      Some of these will need a paper prescription and others can be bought over the counter. Ask your nurse if you have questions.    Bring a paper prescription for each of these medications    amLODIPine 5 MG tablet    lisinopril 10 MG tablet         Scribe Disclosure:  I, Amy Weber, am serving as a scribe at 9:38 PM on 6/26/2019 to document services personally performed by Marlene Lord MD based on my observations and the provider's statements to me.   EMERGENCY DEPARTMENT       Marlene Lord MD  06/27/19 0003

## 2019-07-03 ENCOUNTER — HOSPITAL ENCOUNTER (EMERGENCY)
Facility: CLINIC | Age: 64
Discharge: HOME | End: 2019-07-04
Attending: EMERGENCY MEDICINE | Admitting: EMERGENCY MEDICINE
Payer: MEDICAID

## 2019-07-03 ENCOUNTER — APPOINTMENT (OUTPATIENT)
Dept: CT IMAGING | Facility: CLINIC | Age: 64
End: 2019-07-03
Attending: EMERGENCY MEDICINE
Payer: MEDICAID

## 2019-07-03 DIAGNOSIS — R51.9 NONINTRACTABLE EPISODIC HEADACHE, UNSPECIFIED HEADACHE TYPE: ICD-10-CM

## 2019-07-03 DIAGNOSIS — I10 HYPERTENSION, UNSPECIFIED TYPE: ICD-10-CM

## 2019-07-03 LAB
ALBUMIN SERPL-MCNC: 3.3 G/DL (ref 3.4–5)
ALP SERPL-CCNC: 67 U/L (ref 40–150)
ALT SERPL W P-5'-P-CCNC: 35 U/L (ref 0–70)
ANION GAP SERPL CALCULATED.3IONS-SCNC: 7 MMOL/L (ref 3–14)
AST SERPL W P-5'-P-CCNC: 26 U/L (ref 0–45)
BASOPHILS # BLD AUTO: 0 10E9/L (ref 0–0.2)
BASOPHILS NFR BLD AUTO: 0.3 %
BILIRUB SERPL-MCNC: 1 MG/DL (ref 0.2–1.3)
BUN SERPL-MCNC: 18 MG/DL (ref 7–30)
CALCIUM SERPL-MCNC: 8.8 MG/DL (ref 8.5–10.1)
CHLORIDE SERPL-SCNC: 110 MMOL/L (ref 94–109)
CO2 SERPL-SCNC: 25 MMOL/L (ref 20–32)
CREAT SERPL-MCNC: 1.25 MG/DL (ref 0.66–1.25)
DIFFERENTIAL METHOD BLD: ABNORMAL
EOSINOPHIL # BLD AUTO: 0.1 10E9/L (ref 0–0.7)
EOSINOPHIL NFR BLD AUTO: 3 %
ERYTHROCYTE [DISTWIDTH] IN BLOOD BY AUTOMATED COUNT: 13.8 % (ref 10–15)
GFR SERPL CREATININE-BSD FRML MDRD: 61 ML/MIN/{1.73_M2}
GLUCOSE SERPL-MCNC: 112 MG/DL (ref 70–99)
HCT VFR BLD AUTO: 44.9 % (ref 40–53)
HGB BLD-MCNC: 15.7 G/DL (ref 13.3–17.7)
IMM GRANULOCYTES # BLD: 0 10E9/L (ref 0–0.4)
IMM GRANULOCYTES NFR BLD: 0 %
LYMPHOCYTES # BLD AUTO: 1.2 10E9/L (ref 0.8–5.3)
LYMPHOCYTES NFR BLD AUTO: 31.4 %
MCH RBC QN AUTO: 33.2 PG (ref 26.5–33)
MCHC RBC AUTO-ENTMCNC: 35 G/DL (ref 31.5–36.5)
MCV RBC AUTO: 95 FL (ref 78–100)
MONOCYTES # BLD AUTO: 0.4 10E9/L (ref 0–1.3)
MONOCYTES NFR BLD AUTO: 11.9 %
NEUTROPHILS # BLD AUTO: 2 10E9/L (ref 1.6–8.3)
NEUTROPHILS NFR BLD AUTO: 53.4 %
NRBC # BLD AUTO: 0 10*3/UL
NRBC BLD AUTO-RTO: 0 /100
PLATELET # BLD AUTO: 131 10E9/L (ref 150–450)
POTASSIUM SERPL-SCNC: 3.4 MMOL/L (ref 3.4–5.3)
PROT SERPL-MCNC: 7 G/DL (ref 6.8–8.8)
RBC # BLD AUTO: 4.73 10E12/L (ref 4.4–5.9)
SODIUM SERPL-SCNC: 142 MMOL/L (ref 133–144)
WBC # BLD AUTO: 3.7 10E9/L (ref 4–11)

## 2019-07-03 PROCEDURE — 85025 COMPLETE CBC W/AUTO DIFF WBC: CPT | Performed by: EMERGENCY MEDICINE

## 2019-07-03 PROCEDURE — 70450 CT HEAD/BRAIN W/O DYE: CPT

## 2019-07-03 PROCEDURE — 80053 COMPREHEN METABOLIC PANEL: CPT | Performed by: EMERGENCY MEDICINE

## 2019-07-03 PROCEDURE — 96374 THER/PROPH/DIAG INJ IV PUSH: CPT

## 2019-07-03 PROCEDURE — 25000132 ZZH RX MED GY IP 250 OP 250 PS 637: Performed by: EMERGENCY MEDICINE

## 2019-07-03 PROCEDURE — 96375 TX/PRO/DX INJ NEW DRUG ADDON: CPT

## 2019-07-03 PROCEDURE — 99285 EMERGENCY DEPT VISIT HI MDM: CPT | Mod: 25

## 2019-07-03 PROCEDURE — 25000128 H RX IP 250 OP 636: Performed by: EMERGENCY MEDICINE

## 2019-07-03 RX ORDER — KETOROLAC TROMETHAMINE 30 MG/ML
30 INJECTION, SOLUTION INTRAMUSCULAR; INTRAVENOUS ONCE
Status: COMPLETED | OUTPATIENT
Start: 2019-07-03 | End: 2019-07-03

## 2019-07-03 RX ORDER — METOCLOPRAMIDE HYDROCHLORIDE 5 MG/ML
10 INJECTION INTRAMUSCULAR; INTRAVENOUS ONCE
Status: COMPLETED | OUTPATIENT
Start: 2019-07-03 | End: 2019-07-03

## 2019-07-03 RX ORDER — AMLODIPINE BESYLATE 5 MG/1
5 TABLET ORAL ONCE
Status: COMPLETED | OUTPATIENT
Start: 2019-07-03 | End: 2019-07-03

## 2019-07-03 RX ORDER — LISINOPRIL 10 MG/1
10 TABLET ORAL ONCE
Status: COMPLETED | OUTPATIENT
Start: 2019-07-03 | End: 2019-07-03

## 2019-07-03 RX ORDER — DIPHENHYDRAMINE HYDROCHLORIDE 50 MG/ML
25 INJECTION INTRAMUSCULAR; INTRAVENOUS ONCE
Status: COMPLETED | OUTPATIENT
Start: 2019-07-03 | End: 2019-07-03

## 2019-07-03 RX ADMIN — METOCLOPRAMIDE 10 MG: 5 INJECTION, SOLUTION INTRAMUSCULAR; INTRAVENOUS at 23:09

## 2019-07-03 RX ADMIN — DIPHENHYDRAMINE HYDROCHLORIDE 25 MG: 50 INJECTION, SOLUTION INTRAMUSCULAR; INTRAVENOUS at 23:10

## 2019-07-03 RX ADMIN — AMLODIPINE BESYLATE 5 MG: 5 TABLET ORAL at 23:15

## 2019-07-03 RX ADMIN — KETOROLAC TROMETHAMINE 30 MG: 30 INJECTION, SOLUTION INTRAMUSCULAR at 23:08

## 2019-07-03 RX ADMIN — LISINOPRIL 10 MG: 10 TABLET ORAL at 23:15

## 2019-07-03 ASSESSMENT — ENCOUNTER SYMPTOMS
PHOTOPHOBIA: 1
NAUSEA: 1
FEVER: 0
VOMITING: 1
CHILLS: 0
HEADACHES: 1

## 2019-07-03 ASSESSMENT — MIFFLIN-ST. JEOR: SCORE: 1590.97

## 2019-07-03 NOTE — ED AVS SNAPSHOT
Emergency Department  [address] [place] [address]  Phone:  [phone]  Fax:  [phone]                                    Abhilash Gonzalez   MRN: [id number]    Department:   Emergency Department   Date of Visit:  7/3/2019           After Visit Summary Signature Page    I have received my discharge instructions, and my questions have been answered. I have discussed any challenges I see with this plan with the nurse or doctor.    ..........................................................................................................................................  Patient/Patient Representative Signature      ..........................................................................................................................................  Patient Representative Print Name and Relationship to Patient    ..................................................               ................................................  Date                                   Time    ..........................................................................................................................................  Reviewed by Signature/Title    ...................................................              ..............................................  Date                                               Time          22EPIC Rev 08/18

## 2019-07-04 VITALS
OXYGEN SATURATION: 94 % | TEMPERATURE: 98.3 F | SYSTOLIC BLOOD PRESSURE: 164 MMHG | BODY MASS INDEX: 27.28 KG/M2 | DIASTOLIC BLOOD PRESSURE: 88 MMHG | WEIGHT: 180 LBS | HEIGHT: 68 IN | HEART RATE: 89 BPM | RESPIRATION RATE: 20 BRPM

## 2019-07-04 RX ORDER — AMLODIPINE BESYLATE 5 MG/1
5 TABLET ORAL DAILY
Qty: 30 TABLET | Refills: 0 | Status: SHIPPED | OUTPATIENT
Start: 2019-07-04 | End: 2019-07-20

## 2019-07-04 RX ORDER — LISINOPRIL 10 MG/1
10 TABLET ORAL DAILY
Qty: 30 TABLET | Refills: 0 | Status: ON HOLD | OUTPATIENT
Start: 2019-07-04 | End: 2019-08-05

## 2019-07-04 NOTE — ED NOTES
RN at bedside. Pt extremely drowsy prior to receiving medications. Pt falling asleep mid sentence.

## 2019-07-04 NOTE — DISCHARGE INSTRUCTIONS
Call your clinic if you need help filling your blood pressure medicines.    Schedule an appointment to talk about your headaches.

## 2019-07-04 NOTE — ED NOTES
Pt returned to room from CT, pt reports his headache is better. Pt snoring, respirations even and unlabored.
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
## 2019-07-04 NOTE — ED TRIAGE NOTES
HA for a couple days. Got worse today. Worst HA he has ever had. Reports blurred vision, photophobia, phonophobia.

## 2019-07-15 ENCOUNTER — APPOINTMENT (OUTPATIENT)
Dept: CT IMAGING | Facility: CLINIC | Age: 64
End: 2019-07-15
Attending: EMERGENCY MEDICINE
Payer: MEDICAID

## 2019-07-15 ENCOUNTER — HOSPITAL ENCOUNTER (EMERGENCY)
Facility: CLINIC | Age: 64
Discharge: HOME | End: 2019-07-15
Attending: EMERGENCY MEDICINE | Admitting: EMERGENCY MEDICINE
Payer: MEDICAID

## 2019-07-15 VITALS
TEMPERATURE: 98.3 F | HEART RATE: 100 BPM | BODY MASS INDEX: 27.37 KG/M2 | RESPIRATION RATE: 16 BRPM | DIASTOLIC BLOOD PRESSURE: 85 MMHG | OXYGEN SATURATION: 95 % | SYSTOLIC BLOOD PRESSURE: 148 MMHG | WEIGHT: 180 LBS

## 2019-07-15 DIAGNOSIS — R10.84 GENERALIZED ABDOMINAL PAIN: ICD-10-CM

## 2019-07-15 LAB
ALBUMIN SERPL-MCNC: 3.2 G/DL (ref 3.4–5)
ALBUMIN UR-MCNC: 10 MG/DL
ALP SERPL-CCNC: 69 U/L (ref 40–150)
ALT SERPL W P-5'-P-CCNC: 34 U/L (ref 0–70)
ANION GAP SERPL CALCULATED.3IONS-SCNC: 7 MMOL/L (ref 3–14)
APPEARANCE UR: CLEAR
AST SERPL W P-5'-P-CCNC: 26 U/L (ref 0–45)
BASOPHILS # BLD AUTO: 0 10E9/L (ref 0–0.2)
BASOPHILS NFR BLD AUTO: 0.2 %
BILIRUB SERPL-MCNC: 1.2 MG/DL (ref 0.2–1.3)
BILIRUB UR QL STRIP: NEGATIVE
BUN SERPL-MCNC: 17 MG/DL (ref 7–30)
CALCIUM SERPL-MCNC: 8.7 MG/DL (ref 8.5–10.1)
CAOX CRY #/AREA URNS HPF: ABNORMAL /HPF
CHLORIDE SERPL-SCNC: 107 MMOL/L (ref 94–109)
CO2 BLDCOV-SCNC: 26 MMOL/L (ref 21–28)
CO2 SERPL-SCNC: 28 MMOL/L (ref 20–32)
COLOR UR AUTO: YELLOW
CREAT BLD-MCNC: 1.7 MG/DL (ref 0.66–1.25)
CREAT SERPL-MCNC: 1.5 MG/DL (ref 0.66–1.25)
DIFFERENTIAL METHOD BLD: ABNORMAL
EOSINOPHIL # BLD AUTO: 0.1 10E9/L (ref 0–0.7)
EOSINOPHIL NFR BLD AUTO: 1.7 %
ERYTHROCYTE [DISTWIDTH] IN BLOOD BY AUTOMATED COUNT: 13.6 % (ref 10–15)
GFR SERPL CREATININE-BSD FRML MDRD: 41 ML/MIN/{1.73_M2}
GFR SERPL CREATININE-BSD FRML MDRD: 49 ML/MIN/{1.73_M2}
GLUCOSE SERPL-MCNC: 92 MG/DL (ref 70–99)
GLUCOSE UR STRIP-MCNC: NEGATIVE MG/DL
HCT VFR BLD AUTO: 45.4 % (ref 40–53)
HGB BLD-MCNC: 16.1 G/DL (ref 13.3–17.7)
HGB UR QL STRIP: NEGATIVE
HYALINE CASTS #/AREA URNS LPF: 2 /LPF (ref 0–2)
IMM GRANULOCYTES # BLD: 0 10E9/L (ref 0–0.4)
IMM GRANULOCYTES NFR BLD: 0.2 %
KETONES UR STRIP-MCNC: 5 MG/DL
LACTATE BLD-SCNC: 0.7 MMOL/L (ref 0.7–2.1)
LEUKOCYTE ESTERASE UR QL STRIP: NEGATIVE
LIPASE SERPL-CCNC: 95 U/L (ref 73–393)
LYMPHOCYTES # BLD AUTO: 1 10E9/L (ref 0.8–5.3)
LYMPHOCYTES NFR BLD AUTO: 18.4 %
MCH RBC QN AUTO: 33.3 PG (ref 26.5–33)
MCHC RBC AUTO-ENTMCNC: 35.5 G/DL (ref 31.5–36.5)
MCV RBC AUTO: 94 FL (ref 78–100)
MONOCYTES # BLD AUTO: 0.6 10E9/L (ref 0–1.3)
MONOCYTES NFR BLD AUTO: 12 %
MUCOUS THREADS #/AREA URNS LPF: PRESENT /LPF
NEUTROPHILS # BLD AUTO: 3.5 10E9/L (ref 1.6–8.3)
NEUTROPHILS NFR BLD AUTO: 67.5 %
NITRATE UR QL: NEGATIVE
NRBC # BLD AUTO: 0 10*3/UL
NRBC BLD AUTO-RTO: 0 /100
PCO2 BLDV: 45 MM HG (ref 40–50)
PH BLDV: 7.36 PH (ref 7.32–7.43)
PH UR STRIP: 6 PH (ref 5–7)
PLATELET # BLD AUTO: 165 10E9/L (ref 150–450)
PO2 BLDV: 36 MM HG (ref 25–47)
POTASSIUM SERPL-SCNC: 3 MMOL/L (ref 3.4–5.3)
PROT SERPL-MCNC: 7.2 G/DL (ref 6.8–8.8)
RBC # BLD AUTO: 4.83 10E12/L (ref 4.4–5.9)
RBC #/AREA URNS AUTO: 0 /HPF (ref 0–2)
SAO2 % BLDV FROM PO2: 65 %
SODIUM SERPL-SCNC: 142 MMOL/L (ref 133–144)
SOURCE: ABNORMAL
SP GR UR STRIP: 1.01 (ref 1–1.03)
SQUAMOUS #/AREA URNS AUTO: <1 /HPF (ref 0–1)
UROBILINOGEN UR STRIP-MCNC: 2 MG/DL (ref 0–2)
WBC # BLD AUTO: 5.3 10E9/L (ref 4–11)
WBC #/AREA URNS AUTO: 1 /HPF (ref 0–5)

## 2019-07-15 PROCEDURE — 25000125 ZZHC RX 250: Performed by: EMERGENCY MEDICINE

## 2019-07-15 PROCEDURE — 25000128 H RX IP 250 OP 636: Performed by: EMERGENCY MEDICINE

## 2019-07-15 PROCEDURE — 82803 BLOOD GASES ANY COMBINATION: CPT

## 2019-07-15 PROCEDURE — 96361 HYDRATE IV INFUSION ADD-ON: CPT

## 2019-07-15 PROCEDURE — 80053 COMPREHEN METABOLIC PANEL: CPT | Performed by: EMERGENCY MEDICINE

## 2019-07-15 PROCEDURE — 99285 EMERGENCY DEPT VISIT HI MDM: CPT | Mod: 25

## 2019-07-15 PROCEDURE — 74177 CT ABD & PELVIS W/CONTRAST: CPT

## 2019-07-15 PROCEDURE — 96374 THER/PROPH/DIAG INJ IV PUSH: CPT | Mod: 59

## 2019-07-15 PROCEDURE — 81001 URINALYSIS AUTO W/SCOPE: CPT | Performed by: EMERGENCY MEDICINE

## 2019-07-15 PROCEDURE — 96375 TX/PRO/DX INJ NEW DRUG ADDON: CPT

## 2019-07-15 PROCEDURE — 83605 ASSAY OF LACTIC ACID: CPT

## 2019-07-15 PROCEDURE — 85025 COMPLETE CBC W/AUTO DIFF WBC: CPT | Performed by: EMERGENCY MEDICINE

## 2019-07-15 PROCEDURE — 82565 ASSAY OF CREATININE: CPT

## 2019-07-15 PROCEDURE — 83690 ASSAY OF LIPASE: CPT | Performed by: EMERGENCY MEDICINE

## 2019-07-15 RX ORDER — IOPAMIDOL 755 MG/ML
91 INJECTION, SOLUTION INTRAVASCULAR ONCE
Status: COMPLETED | OUTPATIENT
Start: 2019-07-15 | End: 2019-07-15

## 2019-07-15 RX ORDER — HYDROMORPHONE HYDROCHLORIDE 1 MG/ML
0.5 INJECTION, SOLUTION INTRAMUSCULAR; INTRAVENOUS; SUBCUTANEOUS
Status: DISCONTINUED | OUTPATIENT
Start: 2019-07-15 | End: 2019-07-15 | Stop reason: HOSPADM

## 2019-07-15 RX ORDER — ONDANSETRON 2 MG/ML
4 INJECTION INTRAMUSCULAR; INTRAVENOUS ONCE
Status: COMPLETED | OUTPATIENT
Start: 2019-07-15 | End: 2019-07-15

## 2019-07-15 RX ADMIN — IOPAMIDOL 91 ML: 755 INJECTION, SOLUTION INTRAVENOUS at 12:48

## 2019-07-15 RX ADMIN — HYDROMORPHONE HYDROCHLORIDE 0.5 MG: 1 INJECTION, SOLUTION INTRAMUSCULAR; INTRAVENOUS; SUBCUTANEOUS at 12:32

## 2019-07-15 RX ADMIN — SODIUM CHLORIDE 1000 ML: 9 INJECTION, SOLUTION INTRAVENOUS at 12:32

## 2019-07-15 RX ADMIN — ONDANSETRON 4 MG: 2 INJECTION INTRAMUSCULAR; INTRAVENOUS at 12:32

## 2019-07-15 RX ADMIN — SODIUM CHLORIDE 68 ML: 9 INJECTION, SOLUTION INTRAVENOUS at 12:54

## 2019-07-15 ASSESSMENT — ENCOUNTER SYMPTOMS
DYSURIA: 0
NAUSEA: 1
VOMITING: 1
DIARRHEA: 1
ABDOMINAL PAIN: 1
FREQUENCY: 1

## 2019-07-15 NOTE — ED NOTES
Bed: ED29  Expected date: 7/15/19  Expected time: 12:04 PM  Means of arrival:   Comments:  Triage

## 2019-07-15 NOTE — ED AVS SNAPSHOT
Emergency Department  [address] [place] [address]  Phone:  [phone]  Fax:  [phone]                                    Abhilash Gonzalez   MRN: [id number]    Department:   Emergency Department   Date of Visit:  7/15/2019           After Visit Summary Signature Page    I have received my discharge instructions, and my questions have been answered. I have discussed any challenges I see with this plan with the nurse or doctor.    ..........................................................................................................................................  Patient/Patient Representative Signature      ..........................................................................................................................................  Patient Representative Print Name and Relationship to Patient    ..................................................               ................................................  Date                                   Time    ..........................................................................................................................................  Reviewed by Signature/Title    ...................................................              ..............................................  Date                                               Time          22EPIC Rev 08/18

## 2019-07-15 NOTE — ED PROVIDER NOTES
History     Chief Complaint:    Abdominal Pain      HPI   Abhilash Gonzalez is a 63 year old male with a history of cecal volvulus who presents with abdominal pain. The patient reports that his constant lower left abdominal pain began last night and is accompanied by pain while passing diarrhea. Endorses accompanying nausea, emesis, and increased urinary frequency. The patient denies dysuria. He denies fever.    Allergies:  Codeine  Hydrocodone-Acetaminophen    Medications:    Albuterol  Norvasc  Ceftin  Lisinopril  Nitrostat    Past Medical History:    Depression  DJD  Hypertension  Polysubstance abuse  TB  Cecal volvulus  OA  DDD    Past Surgical History:    Bronchoscopy  Colonoscopy  Hand surgery  Mandible surgery  Knee surgery    Family History:    Mother: hypertension  Brother: cerebrovascular disease    Social History:  Smoking Status: Smoker  Smokeless Tobacco: Never Used  1.0 packs daily. cigarettes  Alcohol Use: Positive  Drug Use: Positive  Cocaine, Marijuana  Marital Status:  Single        Review of Systems   Gastrointestinal: Positive for abdominal pain, diarrhea, nausea and vomiting.   Genitourinary: Positive for frequency. Negative for dysuria.   All other systems reviewed and are negative.    Physical Exam     Patient Vitals for the past 24 hrs:   BP Temp Temp src Pulse Heart Rate Resp SpO2 Weight   07/15/19 1207 148/85 98.3  F (36.8  C) Oral 100 100 16 95 % 81.6 kg (180 lb)      Physical Exam  General/Appearance: appears stated age, well-groomed, appears comfortable  Eyes: EOMI, no scleral injection, no icterus  ENT: MMM  Neck: supple, nl ROM, no stiffness  Cardiovascular: RRR, nl S1S2, no m/r/g, 2+ pulses in all 4 extremities, cap refill <2sec  Respiratory: CTAB, good air movement throughout, no wheezes/rhonchi/rales, no increased WOB, no retractions  Back: no lesions  GI: abd soft, non-distended, mild LLQ ttp,  no HSM, no rebound, no guarding, nl BS  MSK: BARBOUR, good tone, no bony abnormality  Skin:  warm and well-perfused, no rash, no edema, no ecchymosis, nl turgor  Neuro: GCS 15, alert and oriented, no gross focal neuro deficits  Psych: interacts appropriately  Heme: no petechia, no purpura, no active bleeding        Emergency Department Course     Imaging:  Radiology findings were communicated with the patient who voiced understanding of the findings.    CT Abdomen Pelvis w Contrast  1. No acute abnormality demonstrated in the abdomen or pelvis.  2. Fatty infiltration of the liver.  Reading per radiology    Laboratory:  Laboratory findings were communicated with the patient who voiced understanding of the findings.    UA with Microscopic: Urineketon: 5 (A), Protein Albumin Urine: 10 (A), Mucous Urine: Present (A), Calcium Oxalate: Many (A) o/w WNL  ISTAT gases lactate: o/w WNL    Creatinine POCT (1232): Creatine: 1.7 (H), GFR Estimate: 41 (H), o/w WNL  CBC: WBC 5.3, HGB 16.1,   CMP: Potassium: 3.0 (L), GFR: 49 (L), Albumin: 3.2 (L)  o/w WNL (Creatinine 1.50 (H))    Lipase: 95    Interventions:  1232 NS, 1 L, IV  1232 Dilaudid 0.5mg IV injection  1232 Zofran 4 mg IV    Emergency Department Course:   Nursing notes and vitals reviewed.    1214 I performed an exam of the patient as documented above.     Medicine administered as documented above.     IV inserted. Blood drawn. This was sent to the lab for further testing, results above.     The patient provided a urine sample here in the emergency department. This was sent for laboratory testing, findings above.     The patient was sent for a CT scan abd/pelvis w/o contrast while in the emergency department, findings above.     1426 I rechecked the patient and discussed the results of his workup thus far.      Prior to discharge, I personally reviewed the laboratory and imaging results with the patient and answered all related questions. Patient was discharged to home.       Impression & Plan      Medical Decision Making:  This patient presents with  abdominal pain.  The differential diagnosis is broad and includes:  Appendicitis, cholecystitis, peptic ulcer disease, diverticulitis, bowel obstruction, ischemia, pancreatitis, amongst others.  Based on clinical exam, laboratory testing, and imaging, no significant etiologies were found.  I have low suspicion for  pathology as UA is neg, hx is not consistent with  testicular pathology.The exact etiology of the pain is not clear at this time.  The patient will be discharged, and was warned that persistent or worsening symptoms should prompt re-examination by a physician (ED if necessary) in 8-12 hours.      Diagnosis:    ICD-10-CM    1. Generalized abdominal pain R10.84        Disposition:  Discharged to home.    Scribe Disclosure:  IIgnacio, am serving as a scribe on 7/15/2019 at 12:14 PM to personally document services performed by Ayana Kenney* based on my observations and the provider's statements to me.     Scribe Disclosure:  I, Sima Gary, am serving as a scribe at 2:30 PM on 7/15/2019 to document services personally performed by Ayana Kenney MD based on my observations and the provider's statements to me.     Ignacio Burns  7/15/2019    EMERGENCY DEPARTMENT       Ayana Kenney MD  07/15/19 5443

## 2019-07-20 ENCOUNTER — HOSPITAL ENCOUNTER (EMERGENCY)
Facility: CLINIC | Age: 64
Discharge: HOME | End: 2019-07-20
Attending: EMERGENCY MEDICINE | Admitting: EMERGENCY MEDICINE
Payer: MEDICAID

## 2019-07-20 VITALS
DIASTOLIC BLOOD PRESSURE: 91 MMHG | SYSTOLIC BLOOD PRESSURE: 150 MMHG | OXYGEN SATURATION: 98 % | BODY MASS INDEX: 26.51 KG/M2 | WEIGHT: 174.9 LBS | HEART RATE: 93 BPM | TEMPERATURE: 99.1 F | HEIGHT: 68 IN | RESPIRATION RATE: 18 BRPM

## 2019-07-20 DIAGNOSIS — I10 ESSENTIAL HYPERTENSION: ICD-10-CM

## 2019-07-20 DIAGNOSIS — R51.9 NONINTRACTABLE HEADACHE, UNSPECIFIED CHRONICITY PATTERN, UNSPECIFIED HEADACHE TYPE: ICD-10-CM

## 2019-07-20 PROCEDURE — 25000132 ZZH RX MED GY IP 250 OP 250 PS 637: Performed by: EMERGENCY MEDICINE

## 2019-07-20 PROCEDURE — 96374 THER/PROPH/DIAG INJ IV PUSH: CPT

## 2019-07-20 PROCEDURE — 99284 EMERGENCY DEPT VISIT MOD MDM: CPT | Mod: Z6 | Performed by: EMERGENCY MEDICINE

## 2019-07-20 PROCEDURE — 96361 HYDRATE IV INFUSION ADD-ON: CPT

## 2019-07-20 PROCEDURE — 25000128 H RX IP 250 OP 636: Performed by: EMERGENCY MEDICINE

## 2019-07-20 PROCEDURE — 99284 EMERGENCY DEPT VISIT MOD MDM: CPT | Mod: 25

## 2019-07-20 PROCEDURE — 96375 TX/PRO/DX INJ NEW DRUG ADDON: CPT

## 2019-07-20 RX ORDER — AMLODIPINE BESYLATE 5 MG/1
5 TABLET ORAL DAILY
Qty: 30 TABLET | Refills: 0 | Status: ON HOLD | OUTPATIENT
Start: 2019-07-20 | End: 2019-08-05

## 2019-07-20 RX ORDER — METOCLOPRAMIDE HYDROCHLORIDE 5 MG/ML
10 INJECTION INTRAMUSCULAR; INTRAVENOUS ONCE
Status: COMPLETED | OUTPATIENT
Start: 2019-07-20 | End: 2019-07-20

## 2019-07-20 RX ORDER — LISINOPRIL 10 MG/1
10 TABLET ORAL DAILY
Qty: 30 TABLET | Refills: 0 | Status: ON HOLD | OUTPATIENT
Start: 2019-07-20 | End: 2019-08-05

## 2019-07-20 RX ORDER — KETOROLAC TROMETHAMINE 15 MG/ML
15 INJECTION, SOLUTION INTRAMUSCULAR; INTRAVENOUS ONCE
Status: COMPLETED | OUTPATIENT
Start: 2019-07-20 | End: 2019-07-20

## 2019-07-20 RX ORDER — AMLODIPINE BESYLATE 5 MG/1
5 TABLET ORAL ONCE
Status: COMPLETED | OUTPATIENT
Start: 2019-07-20 | End: 2019-07-20

## 2019-07-20 RX ORDER — LISINOPRIL 10 MG/1
10 TABLET ORAL ONCE
Status: COMPLETED | OUTPATIENT
Start: 2019-07-20 | End: 2019-07-20

## 2019-07-20 RX ADMIN — AMLODIPINE BESYLATE 5 MG: 5 TABLET ORAL at 01:35

## 2019-07-20 RX ADMIN — SODIUM CHLORIDE 1000 ML: 9 INJECTION, SOLUTION INTRAVENOUS at 01:35

## 2019-07-20 RX ADMIN — KETOROLAC TROMETHAMINE 15 MG: 15 INJECTION, SOLUTION INTRAMUSCULAR; INTRAVENOUS at 01:35

## 2019-07-20 RX ADMIN — LISINOPRIL 10 MG: 10 TABLET ORAL at 01:35

## 2019-07-20 RX ADMIN — METOCLOPRAMIDE 10 MG: 5 INJECTION, SOLUTION INTRAMUSCULAR; INTRAVENOUS at 01:35

## 2019-07-20 ASSESSMENT — ENCOUNTER SYMPTOMS
VOMITING: 1
SHORTNESS OF BREATH: 0
FEVER: 0
HEADACHES: 1
DIARRHEA: 0
NAUSEA: 1
WEAKNESS: 0
PHOTOPHOBIA: 1
NUMBNESS: 0

## 2019-07-20 ASSESSMENT — MIFFLIN-ST. JEOR: SCORE: 1567.84

## 2019-07-20 NOTE — ED AVS SNAPSHOT
[facility], [place], Emergency Department  [address] [place] [address]  Phone:  [phone]                                    Abhilash Gonzalez   MRN: [id number]    Department:  [facility], Emergency Department   Date of Visit:  7/20/2019           After Visit Summary Signature Page    I have received my discharge instructions, and my questions have been answered. I have discussed any challenges I see with this plan with the nurse or doctor.    ..........................................................................................................................................  Patient/Patient Representative Signature      ..........................................................................................................................................  Patient Representative Print Name and Relationship to Patient    ..................................................               ................................................  Date                                   Time    ..........................................................................................................................................  Reviewed by Signature/Title    ...................................................              ..............................................  Date                                               Time          22EPIC Rev 08/18

## 2019-07-20 NOTE — DISCHARGE INSTRUCTIONS
Please make an appointment to follow up with [facility] (phone: [phone]) or [facility] (phone: [phone]) to establish care with a doctor. Make sure you take your blood pressure meds. Return with concerns.

## 2019-07-20 NOTE — ED NOTES
Pt given prescriptions including norvasc and lisinopril, RN went over discharge education including follow up with primary care doctor, and use of anti-hypertensive medications.  Patient verbalized understanding of dicharge instructions.  Bus token given.  Patient discharged at 0302 by PING Mattson
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
## 2019-07-20 NOTE — ED TRIAGE NOTES
Patient presents ambulatory to triage c/o headache and stomach ache since yesterday. Had an episode of diarrhea yesterday. Vomited x2 today.

## 2019-07-28 ENCOUNTER — HOSPITAL ENCOUNTER (EMERGENCY)
Facility: CLINIC | Age: 64
Discharge: HOME | End: 2019-07-28
Attending: EMERGENCY MEDICINE | Admitting: EMERGENCY MEDICINE
Payer: MEDICAID

## 2019-07-28 ENCOUNTER — APPOINTMENT (OUTPATIENT)
Dept: GENERAL RADIOLOGY | Facility: CLINIC | Age: 64
End: 2019-07-28
Attending: EMERGENCY MEDICINE
Payer: MEDICAID

## 2019-07-28 VITALS
TEMPERATURE: 97.1 F | HEIGHT: 68 IN | HEART RATE: 107 BPM | WEIGHT: 175 LBS | RESPIRATION RATE: 16 BRPM | BODY MASS INDEX: 26.52 KG/M2 | OXYGEN SATURATION: 97 % | DIASTOLIC BLOOD PRESSURE: 85 MMHG | SYSTOLIC BLOOD PRESSURE: 154 MMHG

## 2019-07-28 DIAGNOSIS — S70.02XA CONTUSION OF LEFT HIP, INITIAL ENCOUNTER: ICD-10-CM
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
## 2019-07-28 DIAGNOSIS — S93.402A SPRAIN OF LEFT ANKLE, UNSPECIFIED LIGAMENT, INITIAL ENCOUNTER: ICD-10-CM

## 2019-07-28 DIAGNOSIS — S60.222A CONTUSION OF LEFT HAND, INITIAL ENCOUNTER: ICD-10-CM

## 2019-07-28 PROCEDURE — 73130 X-RAY EXAM OF HAND: CPT | Mod: LT

## 2019-07-28 PROCEDURE — 25000132 ZZH RX MED GY IP 250 OP 250 PS 637: Performed by: EMERGENCY MEDICINE

## 2019-07-28 PROCEDURE — 99285 EMERGENCY DEPT VISIT HI MDM: CPT

## 2019-07-28 PROCEDURE — 73610 X-RAY EXAM OF ANKLE: CPT | Mod: LT

## 2019-07-28 PROCEDURE — 73502 X-RAY EXAM HIP UNI 2-3 VIEWS: CPT

## 2019-07-28 RX ORDER — IBUPROFEN 200 MG
400 TABLET ORAL EVERY 8 HOURS PRN
Qty: 30 TABLET | Refills: 0 | Status: SHIPPED | OUTPATIENT
Start: 2019-07-28 | End: 2019-08-07

## 2019-07-28 RX ORDER — OXYCODONE AND ACETAMINOPHEN 5; 325 MG/1; MG/1
2 TABLET ORAL ONCE
Status: COMPLETED | OUTPATIENT
Start: 2019-07-28 | End: 2019-07-28

## 2019-07-28 RX ADMIN — OXYCODONE HYDROCHLORIDE AND ACETAMINOPHEN 2 TABLET: 5; 325 TABLET ORAL at 11:57

## 2019-07-28 ASSESSMENT — ENCOUNTER SYMPTOMS
NECK STIFFNESS: 0
HEADACHES: 0
BACK PAIN: 0
NECK PAIN: 0
ARTHRALGIAS: 1

## 2019-07-28 ASSESSMENT — MIFFLIN-ST. JEOR: SCORE: 1568.29
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
## 2019-07-28 NOTE — ED AVS SNAPSHOT
Emergency Department  [address] [place] [address]  Phone:  [phone]  Fax:  [phone]                                    Abhilash Gonzalez   MRN: [id number]    Department:   Emergency Department   Date of Visit:  7/28/2019           After Visit Summary Signature Page    I have received my discharge instructions, and my questions have been answered. I have discussed any challenges I see with this plan with the nurse or doctor.    ..........................................................................................................................................  Patient/Patient Representative Signature      ..........................................................................................................................................  Patient Representative Print Name and Relationship to Patient    ..................................................               ................................................  Date                                   Time    ..........................................................................................................................................  Reviewed by Signature/Title    ...................................................              ..............................................  Date                                               Time          22EPIC Rev 08/18

## 2019-08-03 ENCOUNTER — HOSPITAL ENCOUNTER (OUTPATIENT)
Facility: CLINIC | Age: 64
Setting detail: OBSERVATION
Discharge: HOME | End: 2019-08-05
Attending: INTERNAL MEDICINE | Admitting: INTERNAL MEDICINE
Payer: MEDICAID

## 2019-08-03 ENCOUNTER — APPOINTMENT (OUTPATIENT)
Dept: GENERAL RADIOLOGY | Facility: CLINIC | Age: 64
End: 2019-08-03
Attending: INTERNAL MEDICINE
Payer: MEDICAID

## 2019-08-03 ENCOUNTER — APPOINTMENT (OUTPATIENT)
Dept: CT IMAGING | Facility: CLINIC | Age: 64
End: 2019-08-03
Attending: INTERNAL MEDICINE
Payer: MEDICAID

## 2019-08-03 DIAGNOSIS — M54.5 CHRONIC LOW BACK PAIN, UNSPECIFIED BACK PAIN LATERALITY, WITH SCIATICA PRESENCE UNSPECIFIED: ICD-10-CM

## 2019-08-03 DIAGNOSIS — I12.9 HYPERTENSIVE KIDNEY DISEASE: ICD-10-CM

## 2019-08-03 DIAGNOSIS — K21.9 GASTROESOPHAGEAL REFLUX DISEASE WITHOUT ESOPHAGITIS: Primary | ICD-10-CM

## 2019-08-03 DIAGNOSIS — G89.29 CHRONIC LOW BACK PAIN, UNSPECIFIED BACK PAIN LATERALITY, WITH SCIATICA PRESENCE UNSPECIFIED: ICD-10-CM

## 2019-08-03 DIAGNOSIS — R07.9 CHEST PAIN, UNSPECIFIED TYPE: ICD-10-CM

## 2019-08-03 DIAGNOSIS — I10 ESSENTIAL HYPERTENSION: ICD-10-CM

## 2019-08-03 DIAGNOSIS — J43.9 PULMONARY EMPHYSEMA, UNSPECIFIED EMPHYSEMA TYPE (H): ICD-10-CM

## 2019-08-03 DIAGNOSIS — N18.30 CKD (CHRONIC KIDNEY DISEASE) STAGE 3, GFR 30-59 ML/MIN (H): ICD-10-CM

## 2019-08-03 DIAGNOSIS — N18.30 CHRONIC KIDNEY DISEASE, STAGE III (MODERATE) (H): ICD-10-CM

## 2019-08-03 LAB
ALBUMIN SERPL-MCNC: 3 G/DL (ref 3.4–5)
ALP SERPL-CCNC: 67 U/L (ref 40–150)
ALT SERPL W P-5'-P-CCNC: 25 U/L (ref 0–70)
ANION GAP SERPL CALCULATED.3IONS-SCNC: 10 MMOL/L (ref 3–14)
AST SERPL W P-5'-P-CCNC: 20 U/L (ref 0–45)
BASOPHILS # BLD AUTO: 0 10E9/L (ref 0–0.2)
BASOPHILS NFR BLD AUTO: 0.2 %
BILIRUB SERPL-MCNC: 0.9 MG/DL (ref 0.2–1.3)
BUN SERPL-MCNC: 25 MG/DL (ref 7–30)
CALCIUM SERPL-MCNC: 8.3 MG/DL (ref 8.5–10.1)
CHLORIDE SERPL-SCNC: 106 MMOL/L (ref 94–109)
CO2 SERPL-SCNC: 27 MMOL/L (ref 20–32)
CREAT SERPL-MCNC: 1.52 MG/DL (ref 0.66–1.25)
CRP SERPL-MCNC: <2.9 MG/L (ref 0–8)
D DIMER PPP FEU-MCNC: 0.8 UG/ML FEU (ref 0–0.5)
DIFFERENTIAL METHOD BLD: NORMAL
EOSINOPHIL # BLD AUTO: 0.1 10E9/L (ref 0–0.7)
EOSINOPHIL NFR BLD AUTO: 2.3 %
ERYTHROCYTE [DISTWIDTH] IN BLOOD BY AUTOMATED COUNT: 13.2 % (ref 10–15)
ETHANOL SERPL-MCNC: <0.01 G/DL
GFR SERPL CREATININE-BSD FRML MDRD: 48 ML/MIN/{1.73_M2}
GLUCOSE SERPL-MCNC: 126 MG/DL (ref 70–99)
HCT VFR BLD AUTO: 43.4 % (ref 40–53)
HGB BLD-MCNC: 14.6 G/DL (ref 13.3–17.7)
IMM GRANULOCYTES # BLD: 0 10E9/L (ref 0–0.4)
IMM GRANULOCYTES NFR BLD: 0 %
INR PPP: 0.98 (ref 0.86–1.14)
LIPASE SERPL-CCNC: 142 U/L (ref 73–393)
LYMPHOCYTES # BLD AUTO: 1.1 10E9/L (ref 0.8–5.3)
LYMPHOCYTES NFR BLD AUTO: 24.8 %
MCH RBC QN AUTO: 32.3 PG (ref 26.5–33)
MCHC RBC AUTO-ENTMCNC: 33.6 G/DL (ref 31.5–36.5)
MCV RBC AUTO: 96 FL (ref 78–100)
MONOCYTES # BLD AUTO: 0.6 10E9/L (ref 0–1.3)
MONOCYTES NFR BLD AUTO: 14.8 %
NEUTROPHILS # BLD AUTO: 2.5 10E9/L (ref 1.6–8.3)
NEUTROPHILS NFR BLD AUTO: 57.9 %
NRBC # BLD AUTO: 0 10*3/UL
NRBC BLD AUTO-RTO: 0 /100
NT-PROBNP SERPL-MCNC: 166 PG/ML (ref 0–900)
PLATELET # BLD AUTO: 180 10E9/L (ref 150–450)
POTASSIUM SERPL-SCNC: 3.4 MMOL/L (ref 3.4–5.3)
PROT SERPL-MCNC: 6.8 G/DL (ref 6.8–8.8)
RBC # BLD AUTO: 4.52 10E12/L (ref 4.4–5.9)
SODIUM SERPL-SCNC: 143 MMOL/L (ref 133–144)
TROPONIN I SERPL-MCNC: <0.015 UG/L (ref 0–0.04)
TSH SERPL DL<=0.005 MIU/L-ACNC: 1.93 MU/L (ref 0.4–4)
WBC # BLD AUTO: 4.3 10E9/L (ref 4–11)

## 2019-08-03 PROCEDURE — 80053 COMPREHEN METABOLIC PANEL: CPT | Performed by: INTERNAL MEDICINE

## 2019-08-03 PROCEDURE — 99285 EMERGENCY DEPT VISIT HI MDM: CPT | Mod: 25 | Performed by: INTERNAL MEDICINE

## 2019-08-03 PROCEDURE — 85025 COMPLETE CBC W/AUTO DIFF WBC: CPT | Performed by: INTERNAL MEDICINE

## 2019-08-03 PROCEDURE — 25000128 H RX IP 250 OP 636: Performed by: INTERNAL MEDICINE

## 2019-08-03 PROCEDURE — 83690 ASSAY OF LIPASE: CPT | Performed by: INTERNAL MEDICINE

## 2019-08-03 PROCEDURE — 93005 ELECTROCARDIOGRAM TRACING: CPT | Performed by: INTERNAL MEDICINE

## 2019-08-03 PROCEDURE — 85379 FIBRIN DEGRADATION QUANT: CPT | Performed by: INTERNAL MEDICINE

## 2019-08-03 PROCEDURE — 71045 X-RAY EXAM CHEST 1 VIEW: CPT

## 2019-08-03 PROCEDURE — 96361 HYDRATE IV INFUSION ADD-ON: CPT | Performed by: INTERNAL MEDICINE

## 2019-08-03 PROCEDURE — 96360 HYDRATION IV INFUSION INIT: CPT | Performed by: INTERNAL MEDICINE

## 2019-08-03 PROCEDURE — 83880 ASSAY OF NATRIURETIC PEPTIDE: CPT | Performed by: INTERNAL MEDICINE

## 2019-08-03 PROCEDURE — 84443 ASSAY THYROID STIM HORMONE: CPT | Performed by: INTERNAL MEDICINE

## 2019-08-03 PROCEDURE — 84484 ASSAY OF TROPONIN QUANT: CPT | Performed by: INTERNAL MEDICINE

## 2019-08-03 PROCEDURE — 25000132 ZZH RX MED GY IP 250 OP 250 PS 637: Performed by: INTERNAL MEDICINE

## 2019-08-03 PROCEDURE — 80320 DRUG SCREEN QUANTALCOHOLS: CPT | Performed by: INTERNAL MEDICINE

## 2019-08-03 PROCEDURE — 86140 C-REACTIVE PROTEIN: CPT | Performed by: INTERNAL MEDICINE

## 2019-08-03 PROCEDURE — 71260 CT THORAX DX C+: CPT

## 2019-08-03 PROCEDURE — 93010 ELECTROCARDIOGRAM REPORT: CPT | Mod: Z6 | Performed by: INTERNAL MEDICINE

## 2019-08-03 PROCEDURE — 85610 PROTHROMBIN TIME: CPT | Performed by: INTERNAL MEDICINE

## 2019-08-03 RX ORDER — ASPIRIN 81 MG/1
162 TABLET, CHEWABLE ORAL ONCE
Status: COMPLETED | OUTPATIENT
Start: 2019-08-03 | End: 2019-08-03

## 2019-08-03 RX ORDER — SODIUM CHLORIDE 9 MG/ML
1000 INJECTION, SOLUTION INTRAVENOUS CONTINUOUS
Status: DISCONTINUED | OUTPATIENT
Start: 2019-08-03 | End: 2019-08-04

## 2019-08-03 RX ORDER — HYDROMORPHONE HYDROCHLORIDE 1 MG/ML
0.5 INJECTION, SOLUTION INTRAMUSCULAR; INTRAVENOUS; SUBCUTANEOUS ONCE
Status: DISCONTINUED | OUTPATIENT
Start: 2019-08-03 | End: 2019-08-04

## 2019-08-03 RX ORDER — IOPAMIDOL 755 MG/ML
100 INJECTION, SOLUTION INTRAVASCULAR ONCE
Status: COMPLETED | OUTPATIENT
Start: 2019-08-03 | End: 2019-08-03

## 2019-08-03 RX ADMIN — ASPIRIN 81 MG CHEWABLE TABLET 162 MG: 81 TABLET CHEWABLE at 22:16

## 2019-08-03 RX ADMIN — IOPAMIDOL 100 ML: 755 INJECTION, SOLUTION INTRAVENOUS at 23:59

## 2019-08-03 RX ADMIN — SODIUM CHLORIDE 1000 ML: 9 INJECTION, SOLUTION INTRAVENOUS at 23:33

## 2019-08-03 ASSESSMENT — ENCOUNTER SYMPTOMS
HEADACHES: 0
WHEEZING: 0
NUMBNESS: 0
NAUSEA: 1
ADENOPATHY: 0
ABDOMINAL PAIN: 0
SHORTNESS OF BREATH: 0
VOMITING: 0
NECK PAIN: 0
CHILLS: 0
BACK PAIN: 1
LIGHT-HEADEDNESS: 0
WEAKNESS: 0
PALPITATIONS: 0
DYSURIA: 0
CONFUSION: 0
COUGH: 0
FEVER: 0

## 2019-08-04 ENCOUNTER — APPOINTMENT (OUTPATIENT)
Dept: CARDIOLOGY | Facility: CLINIC | Age: 64
End: 2019-08-04
Attending: NURSE PRACTITIONER
Payer: MEDICAID

## 2019-08-04 LAB
ALBUMIN UR-MCNC: 10 MG/DL
AMORPH CRY #/AREA URNS HPF: ABNORMAL /HPF
AMPHETAMINES UR QL SCN: NEGATIVE
ANION GAP SERPL CALCULATED.3IONS-SCNC: 6 MMOL/L (ref 3–14)
APPEARANCE UR: CLEAR
BARBITURATES UR QL: NEGATIVE
BENZODIAZ UR QL: NEGATIVE
BILIRUB UR QL STRIP: NEGATIVE
BUN SERPL-MCNC: 19 MG/DL (ref 7–30)
CALCIUM SERPL-MCNC: 8 MG/DL (ref 8.5–10.1)
CANNABINOIDS UR QL SCN: POSITIVE
CAOX CRY #/AREA URNS HPF: ABNORMAL /HPF
CHLORIDE SERPL-SCNC: 111 MMOL/L (ref 94–109)
CO2 SERPL-SCNC: 24 MMOL/L (ref 20–32)
COCAINE UR QL: POSITIVE
COLOR UR AUTO: YELLOW
CREAT SERPL-MCNC: 1.34 MG/DL (ref 0.66–1.25)
ETHANOL UR QL SCN: NEGATIVE
GFR SERPL CREATININE-BSD FRML MDRD: 56 ML/MIN/{1.73_M2}
GLUCOSE SERPL-MCNC: 98 MG/DL (ref 70–99)
GLUCOSE UR STRIP-MCNC: NEGATIVE MG/DL
HGB UR QL STRIP: ABNORMAL
KETONES UR STRIP-MCNC: NEGATIVE MG/DL
LEUKOCYTE ESTERASE UR QL STRIP: NEGATIVE
MAGNESIUM SERPL-MCNC: 2 MG/DL (ref 1.6–2.3)
MUCOUS THREADS #/AREA URNS LPF: PRESENT /LPF
NITRATE UR QL: NEGATIVE
OPIATES UR QL SCN: NEGATIVE
PH UR STRIP: 6.5 PH (ref 5–7)
POTASSIUM SERPL-SCNC: 3.3 MMOL/L (ref 3.4–5.3)
POTASSIUM SERPL-SCNC: 3.8 MMOL/L (ref 3.4–5.3)
RBC #/AREA URNS AUTO: 9 /HPF (ref 0–2)
SODIUM SERPL-SCNC: 141 MMOL/L (ref 133–144)
SOURCE: ABNORMAL
SP GR UR STRIP: 1.03 (ref 1–1.03)
TROPONIN I SERPL-MCNC: <0.015 UG/L (ref 0–0.04)
TROPONIN I SERPL-MCNC: <0.015 UG/L (ref 0–0.04)
UROBILINOGEN UR STRIP-MCNC: 8 MG/DL (ref 0–2)
WBC #/AREA URNS AUTO: 0 /HPF (ref 0–5)

## 2019-08-04 PROCEDURE — 83735 ASSAY OF MAGNESIUM: CPT | Performed by: NURSE PRACTITIONER

## 2019-08-04 PROCEDURE — 84484 ASSAY OF TROPONIN QUANT: CPT | Performed by: NURSE PRACTITIONER

## 2019-08-04 PROCEDURE — 25000128 H RX IP 250 OP 636: Performed by: INTERNAL MEDICINE

## 2019-08-04 PROCEDURE — 99219 ZZC INITIAL OBSERVATION CARE,LEVL II: CPT | Mod: Z6 | Performed by: NURSE PRACTITIONER

## 2019-08-04 PROCEDURE — 25800030 ZZH RX IP 258 OP 636: Performed by: INTERNAL MEDICINE

## 2019-08-04 PROCEDURE — G0378 HOSPITAL OBSERVATION PER HR: HCPCS

## 2019-08-04 PROCEDURE — 93350 STRESS TTE ONLY: CPT | Mod: 26 | Performed by: INTERNAL MEDICINE

## 2019-08-04 PROCEDURE — 25800030 ZZH RX IP 258 OP 636: Performed by: NURSE PRACTITIONER

## 2019-08-04 PROCEDURE — 84132 ASSAY OF SERUM POTASSIUM: CPT | Performed by: EMERGENCY MEDICINE

## 2019-08-04 PROCEDURE — 40000264 ECHO STRESS ECHOCARDIOGRAM

## 2019-08-04 PROCEDURE — 36415 COLL VENOUS BLD VENIPUNCTURE: CPT | Performed by: EMERGENCY MEDICINE

## 2019-08-04 PROCEDURE — 96374 THER/PROPH/DIAG INJ IV PUSH: CPT

## 2019-08-04 PROCEDURE — 84484 ASSAY OF TROPONIN QUANT: CPT | Mod: 91 | Performed by: NURSE PRACTITIONER

## 2019-08-04 PROCEDURE — 80320 DRUG SCREEN QUANTALCOHOLS: CPT | Performed by: INTERNAL MEDICINE

## 2019-08-04 PROCEDURE — 93325 DOPPLER ECHO COLOR FLOW MAPG: CPT | Mod: 26 | Performed by: INTERNAL MEDICINE

## 2019-08-04 PROCEDURE — 25000128 H RX IP 250 OP 636: Performed by: NURSE PRACTITIONER

## 2019-08-04 PROCEDURE — 25500064 ZZH RX 255 OP 636: Performed by: INTERNAL MEDICINE

## 2019-08-04 PROCEDURE — 25000132 ZZH RX MED GY IP 250 OP 250 PS 637: Performed by: PHYSICIAN ASSISTANT

## 2019-08-04 PROCEDURE — 93321 DOPPLER ECHO F-UP/LMTD STD: CPT | Mod: 26 | Performed by: INTERNAL MEDICINE

## 2019-08-04 PROCEDURE — 81001 URINALYSIS AUTO W/SCOPE: CPT | Mod: XU | Performed by: INTERNAL MEDICINE

## 2019-08-04 PROCEDURE — 36415 COLL VENOUS BLD VENIPUNCTURE: CPT | Performed by: NURSE PRACTITIONER

## 2019-08-04 PROCEDURE — 80048 BASIC METABOLIC PNL TOTAL CA: CPT | Performed by: NURSE PRACTITIONER

## 2019-08-04 PROCEDURE — 93016 CV STRESS TEST SUPVJ ONLY: CPT | Performed by: INTERNAL MEDICINE

## 2019-08-04 PROCEDURE — 25000132 ZZH RX MED GY IP 250 OP 250 PS 637: Performed by: NURSE PRACTITIONER

## 2019-08-04 PROCEDURE — 93018 CV STRESS TEST I&R ONLY: CPT | Performed by: INTERNAL MEDICINE

## 2019-08-04 PROCEDURE — 80307 DRUG TEST PRSMV CHEM ANLYZR: CPT | Performed by: INTERNAL MEDICINE

## 2019-08-04 PROCEDURE — 25000125 ZZHC RX 250: Performed by: INTERNAL MEDICINE

## 2019-08-04 PROCEDURE — 80307 DRUG TEST PRSMV CHEM ANLYZR: CPT | Mod: 59 | Performed by: INTERNAL MEDICINE

## 2019-08-04 RX ORDER — SODIUM CHLORIDE 9 MG/ML
INJECTION, SOLUTION INTRAVENOUS CONTINUOUS
Status: DISCONTINUED | OUTPATIENT
Start: 2019-08-04 | End: 2019-08-04 | Stop reason: HOSPADM

## 2019-08-04 RX ORDER — ASPIRIN 81 MG/1
81 TABLET ORAL DAILY
Status: DISCONTINUED | OUTPATIENT
Start: 2019-08-04 | End: 2019-08-05 | Stop reason: HOSPADM

## 2019-08-04 RX ORDER — LISINOPRIL 10 MG/1
10 TABLET ORAL DAILY
Status: DISCONTINUED | OUTPATIENT
Start: 2019-08-04 | End: 2019-08-05 | Stop reason: HOSPADM

## 2019-08-04 RX ORDER — ATROPINE SULFATE 0.4 MG/ML
.2-2 AMPUL (ML) INJECTION
Status: DISCONTINUED | OUTPATIENT
Start: 2019-08-04 | End: 2019-08-04 | Stop reason: HOSPADM

## 2019-08-04 RX ORDER — ASPIRIN 81 MG/1
162 TABLET, CHEWABLE ORAL ONCE
Status: DISCONTINUED | OUTPATIENT
Start: 2019-08-04 | End: 2019-08-04

## 2019-08-04 RX ORDER — LIDOCAINE 40 MG/G
CREAM TOPICAL
Status: DISCONTINUED | OUTPATIENT
Start: 2019-08-04 | End: 2019-08-05 | Stop reason: HOSPADM

## 2019-08-04 RX ORDER — HYDRALAZINE HYDROCHLORIDE 20 MG/ML
5 INJECTION INTRAMUSCULAR; INTRAVENOUS EVERY 4 HOURS PRN
Status: DISCONTINUED | OUTPATIENT
Start: 2019-08-04 | End: 2019-08-04

## 2019-08-04 RX ORDER — POTASSIUM CHLORIDE 750 MG/1
20-40 TABLET, EXTENDED RELEASE ORAL
Status: DISCONTINUED | OUTPATIENT
Start: 2019-08-04 | End: 2019-08-05 | Stop reason: HOSPADM

## 2019-08-04 RX ORDER — DOBUTAMINE HYDROCHLORIDE 200 MG/100ML
10-50 INJECTION INTRAVENOUS CONTINUOUS
Status: DISCONTINUED | OUTPATIENT
Start: 2019-08-04 | End: 2019-08-04 | Stop reason: HOSPADM

## 2019-08-04 RX ORDER — MAGNESIUM SULFATE HEPTAHYDRATE 40 MG/ML
4 INJECTION, SOLUTION INTRAVENOUS EVERY 4 HOURS PRN
Status: DISCONTINUED | OUTPATIENT
Start: 2019-08-04 | End: 2019-08-05 | Stop reason: HOSPADM

## 2019-08-04 RX ORDER — POTASSIUM CL/LIDO/0.9 % NACL 10MEQ/0.1L
10 INTRAVENOUS SOLUTION, PIGGYBACK (ML) INTRAVENOUS
Status: DISCONTINUED | OUTPATIENT
Start: 2019-08-04 | End: 2019-08-05 | Stop reason: HOSPADM

## 2019-08-04 RX ORDER — ACETAMINOPHEN 325 MG/1
650 TABLET ORAL EVERY 4 HOURS PRN
Status: DISCONTINUED | OUTPATIENT
Start: 2019-08-04 | End: 2019-08-05 | Stop reason: HOSPADM

## 2019-08-04 RX ORDER — POTASSIUM CHLORIDE 29.8 MG/ML
20 INJECTION INTRAVENOUS
Status: DISCONTINUED | OUTPATIENT
Start: 2019-08-04 | End: 2019-08-05 | Stop reason: HOSPADM

## 2019-08-04 RX ORDER — HYDRALAZINE HYDROCHLORIDE 10 MG/1
10 TABLET, FILM COATED ORAL ONCE
Status: COMPLETED | OUTPATIENT
Start: 2019-08-04 | End: 2019-08-04

## 2019-08-04 RX ORDER — ALBUTEROL SULFATE 0.83 MG/ML
2.5 SOLUTION RESPIRATORY (INHALATION) EVERY 6 HOURS PRN
Status: DISCONTINUED | OUTPATIENT
Start: 2019-08-04 | End: 2019-08-05 | Stop reason: HOSPADM

## 2019-08-04 RX ORDER — HYDRALAZINE HYDROCHLORIDE 20 MG/ML
10 INJECTION INTRAMUSCULAR; INTRAVENOUS EVERY 4 HOURS PRN
Status: DISCONTINUED | OUTPATIENT
Start: 2019-08-04 | End: 2019-08-05 | Stop reason: HOSPADM

## 2019-08-04 RX ORDER — CLONIDINE HYDROCHLORIDE 0.1 MG/1
0.1 TABLET ORAL ONCE
Status: COMPLETED | OUTPATIENT
Start: 2019-08-04 | End: 2019-08-04

## 2019-08-04 RX ORDER — AMLODIPINE BESYLATE 5 MG/1
5 TABLET ORAL ONCE
Status: COMPLETED | OUTPATIENT
Start: 2019-08-04 | End: 2019-08-04

## 2019-08-04 RX ORDER — PANTOPRAZOLE SODIUM 40 MG/1
40 TABLET, DELAYED RELEASE ORAL
Status: DISCONTINUED | OUTPATIENT
Start: 2019-08-04 | End: 2019-08-05 | Stop reason: HOSPADM

## 2019-08-04 RX ORDER — NITROGLYCERIN 0.4 MG/1
0.4 TABLET SUBLINGUAL EVERY 5 MIN PRN
Status: DISCONTINUED | OUTPATIENT
Start: 2019-08-04 | End: 2019-08-05 | Stop reason: HOSPADM

## 2019-08-04 RX ORDER — AMLODIPINE BESYLATE 5 MG/1
5 TABLET ORAL DAILY
Status: DISCONTINUED | OUTPATIENT
Start: 2019-08-05 | End: 2019-08-04

## 2019-08-04 RX ORDER — POTASSIUM CHLORIDE 1.5 G/1.58G
20-40 POWDER, FOR SOLUTION ORAL
Status: DISCONTINUED | OUTPATIENT
Start: 2019-08-04 | End: 2019-08-05 | Stop reason: HOSPADM

## 2019-08-04 RX ORDER — ACETAMINOPHEN 650 MG/1
650 SUPPOSITORY RECTAL EVERY 4 HOURS PRN
Status: DISCONTINUED | OUTPATIENT
Start: 2019-08-04 | End: 2019-08-05

## 2019-08-04 RX ORDER — AMLODIPINE BESYLATE 5 MG/1
5 TABLET ORAL DAILY
Status: DISCONTINUED | OUTPATIENT
Start: 2019-08-04 | End: 2019-08-04

## 2019-08-04 RX ORDER — POTASSIUM CHLORIDE 750 MG/1
40 TABLET, EXTENDED RELEASE ORAL ONCE
Status: COMPLETED | OUTPATIENT
Start: 2019-08-04 | End: 2019-08-04

## 2019-08-04 RX ORDER — NALOXONE HYDROCHLORIDE 0.4 MG/ML
.1-.4 INJECTION, SOLUTION INTRAMUSCULAR; INTRAVENOUS; SUBCUTANEOUS
Status: DISCONTINUED | OUTPATIENT
Start: 2019-08-04 | End: 2019-08-05 | Stop reason: HOSPADM

## 2019-08-04 RX ORDER — SODIUM CHLORIDE 9 MG/ML
INJECTION, SOLUTION INTRAVENOUS CONTINUOUS
Status: DISCONTINUED | OUTPATIENT
Start: 2019-08-04 | End: 2019-08-04

## 2019-08-04 RX ORDER — ALUMINA, MAGNESIA, AND SIMETHICONE 2400; 2400; 240 MG/30ML; MG/30ML; MG/30ML
30 SUSPENSION ORAL EVERY 4 HOURS PRN
Status: DISCONTINUED | OUTPATIENT
Start: 2019-08-04 | End: 2019-08-05 | Stop reason: HOSPADM

## 2019-08-04 RX ORDER — METOPROLOL TARTRATE 1 MG/ML
1-20 INJECTION, SOLUTION INTRAVENOUS
Status: DISCONTINUED | OUTPATIENT
Start: 2019-08-04 | End: 2019-08-04 | Stop reason: HOSPADM

## 2019-08-04 RX ORDER — AMLODIPINE BESYLATE 10 MG/1
10 TABLET ORAL DAILY
Status: DISCONTINUED | OUTPATIENT
Start: 2019-08-05 | End: 2019-08-05 | Stop reason: HOSPADM

## 2019-08-04 RX ORDER — POTASSIUM CHLORIDE 7.45 MG/ML
10 INJECTION INTRAVENOUS
Status: DISCONTINUED | OUTPATIENT
Start: 2019-08-04 | End: 2019-08-05 | Stop reason: HOSPADM

## 2019-08-04 RX ADMIN — HYDRALAZINE HYDROCHLORIDE 10 MG: 10 TABLET, FILM COATED ORAL at 23:29

## 2019-08-04 RX ADMIN — CLONIDINE HYDROCHLORIDE 0.1 MG: 0.1 TABLET ORAL at 06:57

## 2019-08-04 RX ADMIN — POTASSIUM CHLORIDE 40 MEQ: 10 TABLET, EXTENDED RELEASE ORAL at 10:36

## 2019-08-04 RX ADMIN — NITROGLYCERIN 0.4 MG: 0.4 TABLET SUBLINGUAL at 10:22

## 2019-08-04 RX ADMIN — HYDRALAZINE HYDROCHLORIDE 10 MG: 20 INJECTION, SOLUTION INTRAMUSCULAR; INTRAVENOUS at 10:06

## 2019-08-04 RX ADMIN — LISINOPRIL 10 MG: 10 TABLET ORAL at 08:41

## 2019-08-04 RX ADMIN — HYDRALAZINE HYDROCHLORIDE 5 MG: 20 INJECTION INTRAMUSCULAR; INTRAVENOUS at 02:30

## 2019-08-04 RX ADMIN — DOBUTAMINE HYDROCHLORIDE 10 MCG/KG/MIN: 200 INJECTION INTRAVENOUS at 11:07

## 2019-08-04 RX ADMIN — PANTOPRAZOLE SODIUM 40 MG: 40 TABLET, DELAYED RELEASE ORAL at 08:41

## 2019-08-04 RX ADMIN — POTASSIUM CHLORIDE 40 MEQ: 10 TABLET, EXTENDED RELEASE ORAL at 02:30

## 2019-08-04 RX ADMIN — PERFLUTREN 4 ML: 6.52 INJECTION, SUSPENSION INTRAVENOUS at 11:28

## 2019-08-04 RX ADMIN — POTASSIUM CHLORIDE 20 MEQ: 10 TABLET, EXTENDED RELEASE ORAL at 13:50

## 2019-08-04 RX ADMIN — SODIUM CHLORIDE: 9 INJECTION, SOLUTION INTRAVENOUS at 11:28

## 2019-08-04 RX ADMIN — SODIUM CHLORIDE 1000 ML: 9 INJECTION, SOLUTION INTRAVENOUS at 01:13

## 2019-08-04 RX ADMIN — ASPIRIN 81 MG: 81 TABLET, COATED ORAL at 08:41

## 2019-08-04 RX ADMIN — SODIUM CHLORIDE 80 ML: 9 INJECTION, SOLUTION INTRAVENOUS at 00:00

## 2019-08-04 RX ADMIN — AMLODIPINE BESYLATE 5 MG: 5 TABLET ORAL at 02:57

## 2019-08-04 ASSESSMENT — PAIN DESCRIPTION - DESCRIPTORS
DESCRIPTORS: SHARP

## 2019-08-04 ASSESSMENT — ENCOUNTER SYMPTOMS
NO PATIENT REPORTED PAIN: 1
ACTIVITY IMPAIRMENT: NORMAL
DIETARY ISSUES: ADEQUATE INTAKE
DIETARY ISSUES: NPO
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
## 2019-08-04 NOTE — PROGRESS NOTES
Admission          8/3/2019  9:35 PM  -----------------------------------------------------------  Reason for admission: chest pain   Primary team notified of pt arrival.  Admitted from: [place] ED  Via: stretcher  Accompanied by: self  Belongings: home medications sent to security; clothing, shoes, wallet, and backpack kept with patient at bedside  Admission Profile: complete  Teaching: orientation to unit and call light- call light within reach, call don't fall, use of console, meal times, when to call for the RN, and enforced importance of safety   Access: PIV  Telemetry: patient is refusing  Ht./Wt.: complete  2 RN Skin Assessment Completed By: Alejandra MARTINEZ RN and Brenda THOMAS RN  Pt status:    Temp:  [98.2  F (36.8  C)-98.3  F (36.8  C)] 98.2  F (36.8  C)  Pulse:  [] 90  Heart Rate:  [] 90  Resp:  [18-23] 22  BP: (154-180)/() 180/120  SpO2:  [93 %-99 %] 99 %

## 2019-08-04 NOTE — PROGRESS NOTES
Report received from PING Shultz in Echo lab, test aborted d/t hypotension - see note. Patient returning to unit. Will update ESTRELLA.

## 2019-08-04 NOTE — ED NOTES
[facility], [place]   ED Nurse to Floor Handoff     Abhilash Gonzalez is a 63 year old male who speaks English and lives alone,  in a nursing home  They arrived in the ED by car from home    ED Chief Complaint: Chest Pain (Intermittent left sided chest pain this week, getting worse, headache.)    ED Dx;   Final diagnoses:   Chest pain, unspecified type   Essential hypertension   CKD (chronic kidney disease) stage 3, GFR 30-59 ml/min (H)   Pulmonary emphysema, unspecified emphysema type (H)         Needed?: No    Allergies:   Allergies   Allergen Reactions     Codeine Itching            Hydrocodone-Acetaminophen Itching   .  Past Medical Hx:   Past Medical History:   Diagnosis Date     Back pain      Depression      DJD (degenerative joint disease)      Hypertension      Polysubstance abuse (H)      TB (tuberculosis) 2009    hx TB, states full regimine of medication taken in 2009 and xray in march 2018 looked good     TB lung, latent       Baseline Mental status: WDL  Current Mental Status changes: at basesline    Infection present or suspected this encounter: no  Sepsis suspected: No  Isolation type: No active isolations     Activity level - Baseline/Home:  Independent  Activity Level - Current:   Independent    Bariatric equipment needed?: No    In the ED these meds were given:   Medications   0.9% sodium chloride BOLUS (1,000 mLs Intravenous New Bag 8/3/19 8163)     Followed by   sodium chloride 0.9% infusion (has no administration in time range)   aspirin (ASA) chewable tablet 162 mg (162 mg Oral Given 8/3/19 2216)   iopamidol (ISOVUE-370) solution 100 mL (100 mLs Intravenous Given 8/3/19 2359)   sodium chloride 0.9 % bag 500mL for CT scan flush use (80 mLs As instructed Given 8/4/19 0000)       Drips running?  Yes    Home pump  No    Current LDAs  Peripheral IV 08/03/19 Right Lower forearm (Active)   Site Assessment WDL 8/3/2019 10:10 PM   Line Status Saline locked 8/3/2019  10:10 PM   Phlebitis Scale 0-->no symptoms 8/3/2019 10:10 PM   Infiltration Scale 0 8/3/2019 10:10 PM   Infiltration Site Treatment Method  None 8/3/2019 10:10 PM   Extravasation? No 8/3/2019 10:10 PM   Dressing Intervention New dressing  8/3/2019 10:10 PM   Number of days: 1       Peripheral IV 08/03/19 Right Upper forearm (Active)   Site Assessment WDL 8/3/2019 11:02 PM   Line Status Saline locked 8/3/2019 11:02 PM   Phlebitis Scale 0-->no symptoms 8/3/2019 11:02 PM   Number of days: 1       Labs results:   Labs Ordered and Resulted from Time of ED Arrival Up to the Time of Departure from the ED   COMPREHENSIVE METABOLIC PANEL - Abnormal; Notable for the following components:       Result Value    Glucose 126 (*)     Creatinine 1.52 (*)     GFR Estimate 48 (*)     GFR Estimate If Black 56 (*)     Calcium 8.3 (*)     Albumin 3.0 (*)     All other components within normal limits   D DIMER QUANTITATIVE - Abnormal; Notable for the following components:    D Dimer 0.8 (*)     All other components within normal limits   CBC WITH PLATELETS DIFFERENTIAL   INR   LIPASE   TSH WITH FREE T4 REFLEX   CRP INFLAMMATION   TROPONIN I   NT PROBNP INPATIENT   ALCOHOL ETHYL   ROUTINE UA WITH MICROSCOPIC   DRUG ABUSE SCREEN 6 CHEM DEP URINE ([facility])   PERIPHERAL IV CATHETER   CARDIAC CONTINUOUS MONITORING       Imaging Studies:   Recent Results (from the past 24 hour(s))   XR Chest Port 1 View    Narrative    XR CHEST PORT 1 VW  8/3/2019 9:53 PM     HISTORY:  chest pain    COMPARISON: Film dated 3/18/2018    FINDINGS:  Heart and pulmonary vasculature are normal. The lungs are  clear.      Impression    IMPRESSION: No active infiltrate.    HONEY TENA MD   CT Chest Pulmonary Embolism w Contrast    Narrative    CT CHEST PULMONARY EMBOLISM WITH CONTRAST  8/4/2019 12:04 AM    HISTORY:  Chest pain, elevated D-dimer.    TECHNIQUE: Scans obtained from the apices through the diaphragm with  IV contrast, 65 mL Isovue-370 injected. Radiation dose  for this scan  was reduced using automated exposure control, adjustment of the mA  and/or kV according to patient size, or iterative reconstruction  technique.    COMPARISON:  7/7/2018.    FINDINGS:  Evaluation of the pulmonary arterial system shows no  evidence of embolus. The thoracic aorta is calcified and tortuous. No  aneurysm or dissection. The heart size is normal. There are a few  borderline and mildly enlarged mediastinal and bilateral hilar lymph  nodes which have not significantly changed in the interval. These  contain a few small calcifications and may be granulomatous. No  axillary lymph node enlargement. There is emphysematous disease  throughout the lungs. Dependent atelectasis bilaterally. Small  subpleural nodule in the right hemithorax laterally on image #91 is  stable. No pneumothorax or pleural effusion. Images through the upper  abdomen are remarkable for 2 probable cysts in the liver. The  gallbladder is contracted. No calcified stones.      Impression    IMPRESSION:  1. There is no pulmonary embolus, aortic aneurysm or dissection.  2. Emphysema.        Recent vital signs:   BP (!) 154/97   Pulse 100   Temp 98.3  F (36.8  C)   Resp 23   Wt 79.8 kg (176 lb)   SpO2 93%   BMI 26.76 kg/m      [place] Coma Scale Score: 14 (08/03/19 2225)       Cardiac Rhythm: Tachycardia  Pt needs tele? Yes  Skin/wound Issues: None    Code Status: Full Code    Pain control: fair    Nausea control: pt had none    Abnormal labs/tests/findings requiring intervention: Elevated D-Dimer, CT showed no PE    Family present during ED course? No   Family Comments/Social Situation comments:     Tasks needing completion: None    KYLER HOWARD RN  [facility] --   [phone] [place] [facility]  [phone] [facility]

## 2019-08-04 NOTE — PROVIDER NOTIFICATION
Ed Obs ESTRELLA notified regarding hypertension 177/126 with . Orders received for PRN IV Hydralazine and one time dose Norvasc. Will continue to monitor.

## 2019-08-04 NOTE — PROVIDER NOTIFICATION
ED Obs ESTRELLA updated regarding BP recheck 177/103. Per provider, recheck BP between 0500 and 0600 and treated BP accordingly.

## 2019-08-04 NOTE — PROGRESS NOTES
Observation Goals  List all goals to be met before discharge home:   - Serial troponins and stress test complete. No; next troponin to be drawn at 0600; stress test scheduled for this morning  - Seen and cleared by consultant if applicable N/A  - Adequate pain control on oral analgesia Yes  - Vital signs normal or at patient baseline No; pt hypertensive  - Safe disposition plan has been identified Yes    Nurse to notify provider when observation goals have been met and patient is ready for discharge.    BP (!) 168/112 (BP Location: Right arm)   Pulse 90   Temp 98.2  F (36.8  C) (Oral)   Resp 20   Wt 81.6 kg (180 lb)   SpO2 99%   BMI 27.37 kg/m      Alert and oriented x4. Hypertensive. ESTRELLA aware. Sinus rhythm/sinus tach. Sats 90s on RA. Pt endorses left sided chest pain. Refusing interventions. On clear liquid diet. No BM. Not saving urine. PIV saline locked. Refusing IV maintenance fluids. Uncooperative with cares. Up independently. Plan for dobutamine stress echo today. Will continue to monitor and notify team accordingly.

## 2019-08-04 NOTE — ED NOTES
Observation Brochure and Video    Patient informed of observation status based on provider's order.  Observation brochure was given and the video watched. Patient/Family stated understanding. Questions answered.  KYLER HOWARD

## 2019-08-04 NOTE — PROGRESS NOTES
Pt here for dobutamine stress test from unit 6D. Test, meds and side effects reviewed with patient.  Patient had been hypertensive on the floor and was given 10mg IV hydralazine bringing the BP back to WNL.  Patient had also been given nitroglycerin 0.4mg x 1 for report of ongoing chest pain.  Patient reported continued chest pain upon arrival to the echo lab which he rated at a 7/10.  ECG shows NSR to sinus tachycardia at around 100 bpm.  Dobutamine was started at 10 mcg/kg/min per protocol.  Dobutamine was stopped at 20 mcg/kg/min for hypotension 70/50.  Patient reported increasing chest pain and SOB.  A 100 mL  fluid bolus was given bringing the pressure back to 130's systolic.  Patient reported feeling better as his BP improved.  No atropine nor metoprolol were given.  Post monitoring complete and VSS.   Pt transferred back to  via wheelchair.  Report has been called to [facility] [facility] [place].
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
## 2019-08-04 NOTE — PROGRESS NOTES
Abhilash Gonzalez is a 63 year old male patient.  1. Chest pain, unspecified type    2. Essential hypertension    3. CKD (chronic kidney disease) stage 3, GFR 30-59 ml/min (H)    4. Pulmonary emphysema, unspecified emphysema type (H)      Past Medical History:   Diagnosis Date     Back pain      Depression      DJD (degenerative joint disease)      Hypertension      Polysubstance abuse (H)      TB (tuberculosis) 2009    hx TB, states full regimine of medication taken in 2009 and xray in march 2018 looked good     TB lung, latent      No current outpatient medications on file.     Allergies   Allergen Reactions     Codeine Itching            Hydrocodone-Acetaminophen Itching     Active Problems:    Chest pain    Blood pressure (!) 145/115, pulse 90, temperature 98.2  F (36.8  C), temperature source Oral, resp. rate 20, weight 81.6 kg (180 lb), SpO2 99 %.    Subjective:  Symptoms:  Resolved.  He reports chest pain.    Diet:  NPO.    Activity level: Normal.    Pain:  He reports no pain.      Objective  Assessment:    Condition: In stable condition.  Improving.   (63 yom with h/o HTN, cocaine abuse presents with CP, no EKG and trop elevation.).     Plan:   (Awaiting Dobutamine Echo this am, if neg plan to discharge.).       Gerard Cooper MD  8/4/2019    The pt was NOT seen or examined as he is at echo. The case was reviewed and the plan was discussed with the ESTRELLA.
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
## 2019-08-04 NOTE — ED NOTES
Report to EMS. Patient alert/oriented at transfer. 6C aware of transfer. Belongings sent with patient.

## 2019-08-04 NOTE — ED NOTES
This writer entered patient's room and patient was sleeping, respirations easy. Patient lethargic and falling asleep during interview, easily arousable. Patient did not request pain medication and this writer did not give dilaudid due to patient's LOC, Dr. Rivera informed.

## 2019-08-05 ENCOUNTER — APPOINTMENT (OUTPATIENT)
Dept: CT IMAGING | Facility: CLINIC | Age: 64
End: 2019-08-05
Attending: NURSE PRACTITIONER
Payer: MEDICAID

## 2019-08-05 VITALS
SYSTOLIC BLOOD PRESSURE: 134 MMHG | OXYGEN SATURATION: 98 % | RESPIRATION RATE: 18 BRPM | DIASTOLIC BLOOD PRESSURE: 73 MMHG | TEMPERATURE: 98.1 F | WEIGHT: 180 LBS | HEART RATE: 69 BPM | BODY MASS INDEX: 27.37 KG/M2

## 2019-08-05 LAB
ANION GAP SERPL CALCULATED.3IONS-SCNC: 6 MMOL/L (ref 3–14)
BUN SERPL-MCNC: 17 MG/DL (ref 7–30)
CALCIUM SERPL-MCNC: 8.6 MG/DL (ref 8.5–10.1)
CHLORIDE SERPL-SCNC: 110 MMOL/L (ref 94–109)
CO2 SERPL-SCNC: 24 MMOL/L (ref 20–32)
CREAT SERPL-MCNC: 1.2 MG/DL (ref 0.66–1.25)
GFR SERPL CREATININE-BSD FRML MDRD: 64 ML/MIN/{1.73_M2}
GLUCOSE SERPL-MCNC: 117 MG/DL (ref 70–99)
POTASSIUM SERPL-SCNC: 3.5 MMOL/L (ref 3.4–5.3)
SODIUM SERPL-SCNC: 140 MMOL/L (ref 133–144)

## 2019-08-05 PROCEDURE — 99217 ZZC OBSERVATION CARE DISCHARGE: CPT | Mod: Z6 | Performed by: PHYSICIAN ASSISTANT

## 2019-08-05 PROCEDURE — 25000132 ZZH RX MED GY IP 250 OP 250 PS 637: Performed by: INTERNAL MEDICINE

## 2019-08-05 PROCEDURE — 75574 CT ANGIO HRT W/3D IMAGE: CPT | Mod: 26 | Performed by: INTERNAL MEDICINE

## 2019-08-05 PROCEDURE — 36415 COLL VENOUS BLD VENIPUNCTURE: CPT | Performed by: NURSE PRACTITIONER

## 2019-08-05 PROCEDURE — 25000125 ZZHC RX 250: Performed by: INTERNAL MEDICINE

## 2019-08-05 PROCEDURE — 25000132 ZZH RX MED GY IP 250 OP 250 PS 637: Performed by: STUDENT IN AN ORGANIZED HEALTH CARE EDUCATION/TRAINING PROGRAM

## 2019-08-05 PROCEDURE — 96376 TX/PRO/DX INJ SAME DRUG ADON: CPT

## 2019-08-05 PROCEDURE — 25000132 ZZH RX MED GY IP 250 OP 250 PS 637: Performed by: PHYSICIAN ASSISTANT

## 2019-08-05 PROCEDURE — 80048 BASIC METABOLIC PNL TOTAL CA: CPT | Performed by: NURSE PRACTITIONER

## 2019-08-05 PROCEDURE — 25000128 H RX IP 250 OP 636: Performed by: INTERNAL MEDICINE

## 2019-08-05 PROCEDURE — 75574 CT ANGIO HRT W/3D IMAGE: CPT

## 2019-08-05 PROCEDURE — G0378 HOSPITAL OBSERVATION PER HR: HCPCS

## 2019-08-05 PROCEDURE — 40000141 ZZH STATISTIC PERIPHERAL IV START W/O US GUIDANCE

## 2019-08-05 PROCEDURE — 25000128 H RX IP 250 OP 636: Performed by: NURSE PRACTITIONER

## 2019-08-05 RX ORDER — OXYCODONE HYDROCHLORIDE 10 MG/1
10 TABLET ORAL ONCE
Status: COMPLETED | OUTPATIENT
Start: 2019-08-05 | End: 2019-08-05

## 2019-08-05 RX ORDER — METOPROLOL TARTRATE 50 MG
100 TABLET ORAL ONCE
Status: COMPLETED | OUTPATIENT
Start: 2019-08-05 | End: 2019-08-05

## 2019-08-05 RX ORDER — OXYCODONE HYDROCHLORIDE 10 MG/1
10 TABLET ORAL EVERY 6 HOURS PRN
Status: DISCONTINUED | OUTPATIENT
Start: 2019-08-05 | End: 2019-08-05 | Stop reason: HOSPADM

## 2019-08-05 RX ORDER — AMLODIPINE BESYLATE 5 MG/1
5 TABLET ORAL DAILY
Qty: 30 TABLET | Refills: 0 | Status: SHIPPED | OUTPATIENT
Start: 2019-08-05 | End: 2019-10-29

## 2019-08-05 RX ORDER — OXYCODONE HYDROCHLORIDE 10 MG/1
10 TABLET ORAL EVERY 6 HOURS PRN
Qty: 12 TABLET | Refills: 0 | Status: SHIPPED | OUTPATIENT
Start: 2019-08-05 | End: 2019-10-10

## 2019-08-05 RX ORDER — IOPAMIDOL 755 MG/ML
120 INJECTION, SOLUTION INTRAVASCULAR ONCE
Status: COMPLETED | OUTPATIENT
Start: 2019-08-05 | End: 2019-08-05

## 2019-08-05 RX ORDER — METOPROLOL TARTRATE 1 MG/ML
5-15 INJECTION, SOLUTION INTRAVENOUS
Status: DISCONTINUED | OUTPATIENT
Start: 2019-08-05 | End: 2019-08-05 | Stop reason: HOSPADM

## 2019-08-05 RX ORDER — NITROGLYCERIN 0.4 MG/1
.4-.8 TABLET SUBLINGUAL
Status: DISCONTINUED | OUTPATIENT
Start: 2019-08-05 | End: 2019-08-05 | Stop reason: HOSPADM

## 2019-08-05 RX ORDER — LISINOPRIL 10 MG/1
10 TABLET ORAL DAILY
Qty: 30 TABLET | Refills: 1 | Status: SHIPPED | OUTPATIENT
Start: 2019-08-05 | End: 2019-10-29

## 2019-08-05 RX ORDER — ACYCLOVIR 200 MG/1
0-1 CAPSULE ORAL
Status: DISCONTINUED | OUTPATIENT
Start: 2019-08-05 | End: 2019-08-05 | Stop reason: HOSPADM

## 2019-08-05 RX ORDER — PANTOPRAZOLE SODIUM 40 MG/1
40 TABLET, DELAYED RELEASE ORAL
Qty: 30 TABLET | Refills: 1 | Status: SHIPPED | OUTPATIENT
Start: 2019-08-06 | End: 2019-09-05

## 2019-08-05 RX ORDER — METOPROLOL TARTRATE 50 MG
50-100 TABLET ORAL
Status: DISCONTINUED | OUTPATIENT
Start: 2019-08-05 | End: 2019-08-05 | Stop reason: HOSPADM

## 2019-08-05 RX ADMIN — METOPROLOL TARTRATE 100 MG: 50 TABLET ORAL at 08:58

## 2019-08-05 RX ADMIN — NITROGLYCERIN 0.4 MG: 0.4 TABLET SUBLINGUAL at 10:57

## 2019-08-05 RX ADMIN — HYDRALAZINE HYDROCHLORIDE 10 MG: 20 INJECTION, SOLUTION INTRAMUSCULAR; INTRAVENOUS at 03:40

## 2019-08-05 RX ADMIN — IOPAMIDOL 120 ML: 755 INJECTION, SOLUTION INTRAVENOUS at 10:52

## 2019-08-05 RX ADMIN — METOPROLOL TARTRATE 20 MG: 5 INJECTION INTRAVENOUS at 11:02

## 2019-08-05 RX ADMIN — HYDRALAZINE HYDROCHLORIDE 10 MG: 20 INJECTION, SOLUTION INTRAMUSCULAR; INTRAVENOUS at 07:07

## 2019-08-05 RX ADMIN — OXYCODONE HYDROCHLORIDE 10 MG: 10 TABLET ORAL at 12:53

## 2019-08-05 RX ADMIN — OXYCODONE HYDROCHLORIDE 10 MG: 10 TABLET ORAL at 04:01

## 2019-08-05 NOTE — PROGRESS NOTES
Was hypertensive overnight with 's-160's/100's. Gave hydralazine 10mg po x 1 which did not help. Patient refused anything else and wanted his CP addressed first. He said tylenol was not working. I gave him [id number] po x 1 with improvement in pain and BP.

## 2019-08-05 NOTE — DISCHARGE SUMMARY
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 FINDINGS:   The proximal ascending aorta is normal in size.   Normal pulmonary venous anatomy with all four pulmonary veins draining  into the left atrium.    There is no left ventricular mass or thrombus.   Normal pericardial thickness. There is no pericardial effusion.  Please review Radiology report for incidental noncardiac findings that  will follow separately.    I have personally reviewed the examination and initial interpretation  and I agree with the findings.    JAMESON PALOMO MD                Pending Results:   Unresulted Labs Ordered in the Past 30 Days of this Admission     No orders found from 7/4/2019 to 8/4/2019.                  Discharge Instructions and Follow-Up:     Discharge Procedure Orders   Reason for your hospital stay   Order Comments: Chest pain     Activity   Order Comments: Your activity upon discharge: activity as tolerated     Order Specific Question Answer Comments   Is discharge order? Yes      When to contact your care team   Order Comments: Please go to your nearest emergency room If you were to have a change in the type of chest pain i.e more severe, lasting longer or radiating to your shoulder, arm, neck, jaw or back, shortness of breath or increased pain with breathing, coughing  up blood, feel dizzy or lightheaded, or notice swelling in one leg.     Discharge Instructions   Order Comments: You were admitted to the observation unit for evaluation of your chest pain.  Your EKG was normal. Troponins (a lab test to check if there was damage to the heart tissue) were checked and these were negative. Chest x-ray was normal.  You underwent a stress test and this was normal. The chest pain you came into the emergency room for does not appear to be cardiac chest pain. I recommend continuing your home medications and it will be very important to follow up with your primary care doctor early next week or sooner if your symptoms return.     Full Code     Order Specific Question Answer Comments   Code status determined by: Discussion with patient/legal decision maker      Diet   Order Comments: Follow this diet upon discharge: Regular     Order Specific Question Answer Comments   Is discharge order? Yes           Attestation:  Jodi Treviño PA-C

## 2019-08-05 NOTE — PLAN OF CARE
Observation Goals:   List all goals to be met before discharge home  - Serial troponins and stress test complete: Not met   - Seen and cleared by consultant if applicable: not met   - Adequate pain control on oral analgesia: yes   - Vital signs normal or at patient baseline: Yes, BP stable   - Safe disposition plan has been identified: pending   - Nurse to notify provider when observation goals have been met and patient is ready for discharge.

## 2019-08-05 NOTE — PROGRESS NOTES
Pt arrived for Coronary CT angiogram from unit 6D.  Test, meds and side effects reviewed with pt.  Resting HR greater than 70 bpm. Given 100 mg PO Metoprolol per order on unit prior to his arrival.  HR remained >60 one hour after PO administration. Administered 20 mg IV metoprolol and 0.4 mg SL nitro on CTA table per order. CTA completed; tolerated procedure well. Post monitoring completed and VSS. D/C instructions reviewed with pt whom verbalized understanding of need to increase PO fluids today.  Report has been called to 6D P[facility].

## 2019-08-05 NOTE — PROGRESS NOTES
Patient discahrged home. PIV removed. Patient has all belongings, understands and agrees with discharge instructions.

## 2019-08-05 NOTE — PROGRESS NOTES
Observation Goals  List all goals to be met before discharge home:   - Serial troponins and stress test complete. No, trops negative x 3; patient went for stress test, needed to abort d/t hypotensive 70-80/50's.  - Seen and cleared by consultant if applicable No, Cardiology consult ordered.  - Adequate pain control on oral analgesia Yes, tolerating fluids and Regular diet  - Vital signs normal or at patient baseline Yes, BP improved, HR tachy  /73 (BP Location: Left arm)   Pulse 114   Temp 98.2  F (36.8  C) (Oral)   Resp 20   Wt 81.6 kg (180 lb)   SpO2 98%   BMI 27.37 kg/m      - Safe disposition plan has been identified No,   Nurse to notify provider when observation goals have been met and patient is ready for discharge.    Alert and oriented x4. Hypertension improving. ESTRELLA made aware. Sinus rhythm/sinus tach. Sats 90s on RA. Pt endorses left sided chest pain. Nitroglycerin given once with little relief and onset of slight HA. Tolerating Clear and Regular diet. No BM. Voiding spontaneiously. PIV saline locked. Up independently.   Plan for CT - Coronary Angiogram tomorrow mid-morning. Needs to be NPO for 2 hrs prior to procedure..   Will continue to monitor and notify team accordingly.

## 2019-08-05 NOTE — PROGRESS NOTES
Patient arrived to unit from 6C, transport here to take patient to Stress test, but BP elevated, ESTRELLA at bedside, will need to delay.

## 2019-08-05 NOTE — PLAN OF CARE
Observation Goals:   List all goals to be met before discharge home  - Serial troponins and stress test complete: Not met   - Seen and cleared by consultant if applicable: not met   - Adequate pain control on oral analgesia: yes   - Vital signs normal or at patient baseline: No BP elevated, ESTRELLA aware   - Safe disposition plan has been identified: pending   - Nurse to notify provider when observation goals have been met and patient is ready for discharge.    BP (!) 163/102   Pulse 95   Temp 98.9  F (37.2  C) (Oral)   Resp 18   Wt 81.6 kg (180 lb)   SpO2 97%   BMI 27.37 kg/m

## 2019-08-05 NOTE — PLAN OF CARE
- Serial troponins and stress test complete: No  - Seen and cleared by consultant if applicable: No  - Adequate pain control on oral analgesia: Yes   - Vital signs normal or at patient baseline: No BP elevated, pt declined morning BP meds due to being NPO  - Safe disposition plan has been identified: No    BP (!) 146/89   Pulse 69   Temp 98.3  F (36.8  C) (Oral)   Resp 18   Wt 81.6 kg (180 lb)   SpO2 100%   BMI 27.37 kg/m

## 2019-08-05 NOTE — PROGRESS NOTES
Observation Goals  List all goals to be met before discharge home:   - Serial troponins and stress test complete. No, trops negative x 3; patient went for stress test, needed to abort d/t hypotensive 70-80/50's. - Seen and cleared by consultant if applicable No, Cardiology consult ordered.  - Adequate pain control on oral analgesia Yes, tolerating fluids and Regular diet  - Vital signs normal or at patient baseline Yes, BP improved to 125/78 with HR 92  - Safe disposition plan has been identified No,   Nurse to notify provider when observation goals have been met and patient is ready for discharge.    Alert and oriented x4. Hypertension improving. ESTRELLA made aware. Sinus rhythm/sinus tach. Sats 90s on RA. Pt endorses left sided chest pain. Nitroglycerin given once with little relief and onset of slight HA. Tolerating Clear and Regular diet. No BM. Voiding spontaneiously. PIV saline locked. Up independently.   Plan for CT - Coronary Angiogram tomorrow mid-morning. Needs to be NPO for 2 hrs prior to procedure..   Will continue to monitor and notify team accordingly.

## 2019-08-05 NOTE — PROGRESS NOTES
Observation Goals  List all goals to be met before discharge home:   - Serial troponins and stress test complete. No, trops negative x 3; awaiting stress test, pt currently hypertensive 140/101 - will need to delay stress test scheduled for this morning  - Seen and cleared by consultant if applicable No  - Adequate pain control on oral analgesia Yes  - Vital signs normal or at patient baseline No; pt hypertensive  - Safe disposition plan has been identified Yes    Nurse to notify provider when observation goals have been met and patient is ready for discharge.    Alert and oriented x4. Hypertensive. ESTRELLA made aware. Sinus rhythm/sinus tach. Sats 90s on RA. Pt endorses left sided chest pain. Cooperative with VS but hesitant to take Nitro On clear liquid diet. No BM. Not saving urine. PIV saline locked. Refusing IV maintenance fluids. Uncooperative with cares. Up independently. Plan for dobutamine stress echo today. Will continue to monitor and notify team accordingly.

## 2019-08-05 NOTE — PLAN OF CARE
- Serial troponins and stress test complete: No  - Seen and cleared by consultant if applicable: No  - Adequate pain control on oral analgesia: Yes   - Vital signs normal or at patient baseline: No BP elevated. Provider informed.  - Safe disposition plan has been identified: No    /73 (BP Location: Left arm)   Pulse 69   Temp 98.1  F (36.7  C) (Oral)   Resp 18   Wt 81.6 kg (180 lb)   SpO2 98%   BMI 27.37 kg/m

## 2019-08-05 NOTE — PROGRESS NOTES
CLINICAL NUTRITION SERVICES - ASSESSMENT NOTE     Nutrition Prescription    RECOMMENDATIONS FOR MDs/PROVIDERS TO ORDER:  None at this time.     Malnutrition Status:    Patient does not meet two of the above criteria necessary for diagnosing malnutrition    Recommendations already ordered by Registered Dietitian (RD):  None at this time.     Future/Additional Recommendations:  Monitor adequacy of PO intake.      REASON FOR ASSESSMENT  Abhilash Gonzalez is a/an 63 year old male assessed by the dietitian for Admission Nutrition Risk Screen for reduced oral intake over the last month.     Chart Review:   PMH significant for HTN, CKD III, chronic back pain, polysubstance abuse including stimulant abuse, COPD and latent TB. Pt admitted on 8/3 after presenting with left anterior chest pain with some radiation to the neck and shoulder intermittently over the past week .EKG and serial trop ok, dobutamine echo-failed due to BP went low. At admission pt reported intermittent nausea and 1 episode emesis PTA.    NUTRITION HISTORY  Pt unknown to Clinical Nutrition PTA. Met with pt today who endorses good appetite and PO intake at baseline. Typical diet consists of:  B: eggs, pancakes, whole milk, orange juice, sausage or flores  L: fries, cheeseburger, soda  D: roast beef, dinner roll, salad, corn, soda  Snacks: no    Over the past several weeks, pt has had decreased appetite, which he attributes to chest pain and headaches. Pt reports that he has continued to consume 3 meals daily despite poor appetite.     CURRENT NUTRITION ORDERS  Diet:   NPO   (Previously Clear Liquid -> Regular diet on 8/4)    Intake/Tolerance:   Per flowsheets, pt has consumed 100% 1 meal since admission. Per review of [facility], pt ordered 5 trays from kitchen yesterday (2 clear liquid, 3 regular). Regular diet trays averaging 976 kcal/tray and 39 g protein/day. Pt reports that his appetite is currently low, however he has been able to increase his PO  "intake now that he is in less pain. Pt denies nutrition related questions/concerns at this time.     LABS  Labs reviewed  K+ 3.5 (WNL)  Mg++ 2.0 (WNL) on 8/4  BUN 17 (WNL, Cr 1.20 (WNL), GFR 64  Glucose 117    MEDICATIONS  Medications reviewed    ANTHROPOMETRICS  Height: 1.727 m (5' 8\")  Most Recent Weight: 81.6 kg (180 lb)    IBW: 70 kg (117% IBW)  BMI: Overweight BMI 25-29.9  Weight History:   Wt Readings from Last 10 Encounters:   08/04/19 81.6 kg (180 lb)   07/28/19 79.4 kg (175 lb)   07/20/19 79.3 kg (174 lb 14.4 oz)   07/15/19 81.6 kg (180 lb)   07/03/19 81.6 kg (180 lb)   06/26/19 81.6 kg (180 lb)   04/09/19 81.6 kg (180 lb)   04/08/19 81.6 kg (180 lb)   03/18/19 84.4 kg (186 lb)   07/19/18 81.6 kg (180 lb)   Pt's weight appears stable/increasing over past month. Since 3/18/19, pt has lost 3.2 kg (4% body weight), which is not considered clinically significant weight loss.     Dosing Weight: 80 kg (actual) - based on lowest documented wt so far this adm (79.8 kg on 8/3) and IBW of 70 kg    ASSESSED NUTRITION NEEDS  Estimated Energy Needs: 2000 - 2400 kcals/day (25 - 30 kcals/kg)  Justification: Maintenance  Estimated Protein Needs: 48 - 64 grams protein/day (0.6 - 0.8 grams of pro/kg)  Justification: CKD  Estimated Fluid Needs: 1 mL/kcal  Justification: Maintenance or Per provider pending fluid status    PHYSICAL FINDINGS  See malnutrition section below.    MALNUTRITION  % Intake: Decreased intake does not meet criteria  % Weight Loss: Weight loss does not meet criteria  Subcutaneous Fat Loss: None observed  Muscle Loss: Facial & jaw region:  mild  Fluid Accumulation/Edema: None noted  Malnutrition Diagnosis: Patient does not meet two of the above criteria necessary for diagnosing malnutrition    NUTRITION DIAGNOSIS  Predicted inadequate nutrient intake (energy and possibly protein) related to decreased appetite in setting of pain potentially inhibiting adequate PO intake as evidenced by pt report, however pt " has been ordering substantial meals and eating well since admission.       INTERVENTIONS  Implementation  Nutrition Education: Provided education on role of RD and nutrition POC. Discussed strategies to optimize PO intake when appetite is poor.      Goals  Patient to consume % of nutritionally adequate meal trays TID, or the equivalent with supplements/snacks.     Monitoring/Evaluation  Progress toward goals will be monitored and evaluated per protocol.    Graham Grijalva MS, RD, LD  7A/6D Weekday Pager [phone]

## 2019-08-05 NOTE — PLAN OF CARE
Observation Goals:   List all goals to be met before discharge home  - Serial troponins and stress test complete: Not met   - Seen and cleared by consultant if applicable: not met   - Adequate pain control on oral analgesia: yes   - Vital signs normal or at patient baseline: No BP elevated   - Safe disposition plan has been identified: pending   - Nurse to notify provider when observation goals have been met and patient is ready for discharge.

## 2019-08-05 NOTE — PROGRESS NOTES
Abhilash Gonzalez is a 63 year old male patient.  1. Chest pain, unspecified type    2. Essential hypertension    3. CKD (chronic kidney disease) stage 3, GFR 30-59 ml/min (H)    4. Pulmonary emphysema, unspecified emphysema type (H)      Past Medical History:   Diagnosis Date     Back pain      Depression      DJD (degenerative joint disease)      Hypertension      Polysubstance abuse (H)      TB (tuberculosis) 2009    hx TB, states full regimine of medication taken in 2009 and xray in march 2018 looked good     TB lung, latent      No current outpatient medications on file.     Allergies   Allergen Reactions     Codeine Itching            Hydrocodone-Acetaminophen Itching     Active Problems:    Chest pain    Blood pressure 136/73, pulse 114, temperature 98.2  F (36.8  C), temperature source Oral, resp. rate 20, weight 81.6 kg (180 lb), SpO2 98 %.    Subjective:  Symptoms:  Improved.  He reports chest pain.    Diet:  Adequate intake.    Activity level: Normal.    Pain:  He reports no pain.      Objective:  General Appearance:  Comfortable.    Vital signs: (most recent): Blood pressure 136/73, pulse 114, temperature 98.2  F (36.8  C), temperature source Oral, resp. rate 20, weight 81.6 kg (180 lb), SpO2 98 %.  Vital signs are normal.    Output: Producing urine.    HEENT: Normal HEENT exam.    Lungs:  Normal effort and normal respiratory rate.  Breath sounds clear to auscultation.    Heart: Normal rate.  Regular rhythm.  S1 normal and S2 normal.    Abdomen: Abdomen is soft.  Bowel sounds are normal.   There is no abdominal tenderness.     Extremities: Normal range of motion.    Pulses: Distal pulses are intact.    Neurological: Patient is alert.    Pupils:  Pupils are equal, round, and reactive to light.    Skin:  Warm.      Assessment:    Condition: In stable condition.  Improving.   (63 yom presents with CP left shoulder pain. EKG and serial trop ok, dobutamine echo-failed due to BP went low.     Cards input  appreciated, CT Coronaries in am.).       Gerard Cooper MD  8/4/2019    The pt was seen and examined by myself. The case was reviewed an d the plan was discussed with the ESTRELLA.

## 2019-08-06 LAB — INTERPRETATION ECG - MUSE: NORMAL

## 2019-08-09 ENCOUNTER — HOSPITAL ENCOUNTER (EMERGENCY)
Facility: CLINIC | Age: 64
Discharge: HOME | End: 2019-08-09
Attending: EMERGENCY MEDICINE | Admitting: EMERGENCY MEDICINE
Payer: MEDICAID

## 2019-08-09 ENCOUNTER — APPOINTMENT (OUTPATIENT)
Dept: GENERAL RADIOLOGY | Facility: CLINIC | Age: 64
End: 2019-08-09
Attending: EMERGENCY MEDICINE
Payer: MEDICAID

## 2019-08-09 VITALS
SYSTOLIC BLOOD PRESSURE: 153 MMHG | DIASTOLIC BLOOD PRESSURE: 91 MMHG | HEART RATE: 102 BPM | RESPIRATION RATE: 14 BRPM | WEIGHT: 176 LBS | TEMPERATURE: 97.9 F | OXYGEN SATURATION: 96 % | BODY MASS INDEX: 26.67 KG/M2 | HEIGHT: 68 IN

## 2019-08-09 DIAGNOSIS — R07.9 CHEST PAIN, UNSPECIFIED TYPE: ICD-10-CM

## 2019-08-09 DIAGNOSIS — N28.9 RENAL INSUFFICIENCY: ICD-10-CM

## 2019-08-09 DIAGNOSIS — Z78.9 ALCOHOL USE: ICD-10-CM

## 2019-08-09 LAB
ALBUMIN SERPL-MCNC: 3.4 G/DL (ref 3.4–5)
ALP SERPL-CCNC: 69 U/L (ref 40–150)
ALT SERPL W P-5'-P-CCNC: 26 U/L (ref 0–70)
ANION GAP SERPL CALCULATED.3IONS-SCNC: 7 MMOL/L (ref 3–14)
AST SERPL W P-5'-P-CCNC: 22 U/L (ref 0–45)
BILIRUB SERPL-MCNC: 0.6 MG/DL (ref 0.2–1.3)
BUN SERPL-MCNC: 14 MG/DL (ref 7–30)
CALCIUM SERPL-MCNC: 8.3 MG/DL (ref 8.5–10.1)
CHLORIDE SERPL-SCNC: 110 MMOL/L (ref 94–109)
CO2 SERPL-SCNC: 26 MMOL/L (ref 20–32)
CREAT SERPL-MCNC: 1.35 MG/DL (ref 0.66–1.25)
ETHANOL SERPL-MCNC: 0.1 G/DL
GFR SERPL CREATININE-BSD FRML MDRD: 55 ML/MIN/{1.73_M2}
GLUCOSE SERPL-MCNC: 64 MG/DL (ref 70–99)
INTERPRETATION ECG - MUSE: NORMAL
LIPASE SERPL-CCNC: 107 U/L (ref 73–393)
POTASSIUM SERPL-SCNC: 3.5 MMOL/L (ref 3.4–5.3)
PROT SERPL-MCNC: 7.3 G/DL (ref 6.8–8.8)
SODIUM SERPL-SCNC: 143 MMOL/L (ref 133–144)
TROPONIN I SERPL-MCNC: <0.015 UG/L (ref 0–0.04)

## 2019-08-09 PROCEDURE — 84484 ASSAY OF TROPONIN QUANT: CPT | Performed by: EMERGENCY MEDICINE

## 2019-08-09 PROCEDURE — 93005 ELECTROCARDIOGRAM TRACING: CPT

## 2019-08-09 PROCEDURE — 99285 EMERGENCY DEPT VISIT HI MDM: CPT | Mod: 25

## 2019-08-09 PROCEDURE — 83690 ASSAY OF LIPASE: CPT | Performed by: EMERGENCY MEDICINE

## 2019-08-09 PROCEDURE — 71046 X-RAY EXAM CHEST 2 VIEWS: CPT

## 2019-08-09 PROCEDURE — 80053 COMPREHEN METABOLIC PANEL: CPT | Performed by: EMERGENCY MEDICINE

## 2019-08-09 PROCEDURE — 25000132 ZZH RX MED GY IP 250 OP 250 PS 637: Performed by: EMERGENCY MEDICINE

## 2019-08-09 PROCEDURE — 80320 DRUG SCREEN QUANTALCOHOLS: CPT | Performed by: EMERGENCY MEDICINE

## 2019-08-09 RX ORDER — ACETAMINOPHEN 500 MG
1000 TABLET ORAL ONCE
Status: COMPLETED | OUTPATIENT
Start: 2019-08-09 | End: 2019-08-09

## 2019-08-09 RX ADMIN — ACETAMINOPHEN 1000 MG: 500 TABLET, FILM COATED ORAL at 06:31

## 2019-08-09 ASSESSMENT — MIFFLIN-ST. JEOR: SCORE: 1572.83

## 2019-08-09 NOTE — ED AVS SNAPSHOT
Emergency Department  [address] [place] [address]  Phone:  [phone]  Fax:  [phone]                                    Abhilash Gonzalez   MRN: [id number]    Department:   Emergency Department   Date of Visit:  8/9/2019           After Visit Summary Signature Page    I have received my discharge instructions, and my questions have been answered. I have discussed any challenges I see with this plan with the nurse or doctor.    ..........................................................................................................................................  Patient/Patient Representative Signature      ..........................................................................................................................................  Patient Representative Print Name and Relationship to Patient    ..................................................               ................................................  Date                                   Time    ..........................................................................................................................................  Reviewed by Signature/Title    ...................................................              ..............................................  Date                                               Time          22EPIC Rev 08/18
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
## 2019-08-18 ENCOUNTER — HOSPITAL ENCOUNTER (EMERGENCY)
Facility: CLINIC | Age: 64
Discharge: HOME | End: 2019-08-18
Attending: EMERGENCY MEDICINE | Admitting: EMERGENCY MEDICINE
Payer: MEDICAID

## 2019-08-18 VITALS
WEIGHT: 175 LBS | OXYGEN SATURATION: 95 % | TEMPERATURE: 97.5 F | HEART RATE: 85 BPM | SYSTOLIC BLOOD PRESSURE: 185 MMHG | DIASTOLIC BLOOD PRESSURE: 111 MMHG | BODY MASS INDEX: 26.61 KG/M2 | RESPIRATION RATE: 20 BRPM

## 2019-08-18 DIAGNOSIS — R07.89 ATYPICAL CHEST PAIN: ICD-10-CM

## 2019-08-18 LAB
GLUCOSE BLDC GLUCOMTR-MCNC: 87 MG/DL (ref 70–99)
TROPONIN I BLD-MCNC: 0.01 UG/L (ref 0–0.08)

## 2019-08-18 PROCEDURE — 93005 ELECTROCARDIOGRAM TRACING: CPT | Performed by: EMERGENCY MEDICINE

## 2019-08-18 PROCEDURE — 25000132 ZZH RX MED GY IP 250 OP 250 PS 637: Performed by: EMERGENCY MEDICINE

## 2019-08-18 PROCEDURE — 99284 EMERGENCY DEPT VISIT MOD MDM: CPT | Mod: 25 | Performed by: EMERGENCY MEDICINE

## 2019-08-18 PROCEDURE — 93010 ELECTROCARDIOGRAM REPORT: CPT | Mod: Z6 | Performed by: EMERGENCY MEDICINE

## 2019-08-18 PROCEDURE — 84484 ASSAY OF TROPONIN QUANT: CPT

## 2019-08-18 PROCEDURE — 00000146 ZZHCL STATISTIC GLUCOSE BY METER IP

## 2019-08-18 PROCEDURE — 99284 EMERGENCY DEPT VISIT MOD MDM: CPT | Performed by: EMERGENCY MEDICINE

## 2019-08-18 RX ORDER — ALUMINA, MAGNESIA, AND SIMETHICONE 2400; 2400; 240 MG/30ML; MG/30ML; MG/30ML
30 SUSPENSION ORAL ONCE
Status: COMPLETED | OUTPATIENT
Start: 2019-08-18 | End: 2019-08-18

## 2019-08-18 RX ADMIN — ALUMINUM HYDROXIDE, MAGNESIUM HYDROXIDE, AND DIMETHICONE 30 ML: 400; 400; 40 SUSPENSION ORAL at 06:31

## 2019-08-18 NOTE — ED PROVIDER NOTES
History     Chief Complaint   Patient presents with     Chest Pain     chest pain last 3 hours midsternal radiating to left arm, dyspnea, diaphoretic , pt reports MI 1 year ago      HPI  Abhilash Gonzalez is a 64 year old male with history of prior MI, CAD, frequent ED visits for chest pain, among other medical problems.    Returns again for chest pain today. His pain is identical to prior presentations namely chest pressure with epigastric discomfort associated with it and shortness of breath and weakness.  The pain is nonradiating.  No back pain.  No vomiting.  No loss of consciousness or presyncopal episodes.    recently been evaluated at our facility as well as at an outside facility.  He has had a CTA coronary angiogram on August 3 demonstrating no coronary arthrosclerotic or stenosis with normal coronary anatomy.    He has had multiple stress test and ACS work-ups.    I have reviewed the Medications, Allergies, Past Medical and Surgical History, and Social History in the Epic system.    Review of Systems   14 point review of symptoms was performed and is negative except as noted above.     Physical Exam   BP: (!) 188/127  Pulse: 86  Heart Rate: 88  Temp: 97.5  F (36.4  C)  Resp: 20  Weight: 79.4 kg (175 lb)  SpO2: 95 %      Physical Exam  GEN: Well appearing, non toxic, cooperative and conversant.   HEENT: The head is normocephalic and atraumatic. Pupils are equal round and reactive to light. Extraocular motions are intact. There is no facial swelling. The neck is nontender and supple.   CV: Regular rate and rhythm without murmurs rubs or gallops. 2+ radial pulses bilaterally.  PULM: Clear to auscultation bilaterally.  ABD: Soft, nontender, nondistended.   EXT: Full range of motion.  No edema.  NEURO: Cranial nerves II through XII are intact and symmetric. Bilateral upper and lower extremities grossly show full range of motion without any focal deficits.   SKIN: No rashes, ecchymosis, or lacerations  PSYCH:  Calm and cooperative, interactive.     ED Course        Procedures          EKG at 0534.    cp at time of ECG.  ECG interpretted by me within 10 minutes of production  NSR at 91 bpm. Normal axis.  Normal intervals with exception of prolonged QT. Nl R-wave progress. No ST-T elevations or depressions. Pathologic T-wave inversion are absent.  Minimal voltage criteria for LVH    Interpretation: Normal ECG with exception of voltage criteria for LVH and prolonged QT no significant changes compared to August 9, 2019         Labs Ordered and Resulted from Time of ED Arrival Up to the Time of Departure from the ED   GLUCOSE BY METER   ISTAT TROPONIN NURSING POCT   TROPONIN POCT            Assessments & Plan (with Medical Decision Making)   64-year-old male presenting with chest pain as described.  I carefully reviewed his record.  He has had significant work-up including a CTA in this patient with low risk.  He had a calcium score of 0.  Has had an ECG which has been unrevealing, and troponins have been consistently negative at outside hospitals and a troponin that is negative today.  I see no indication for chest x-ray at the symptoms are unchanged and last x-ray was performed less than 10 days ago.    We discussed his extensive work-up.  Also discussed his utilization of the emergency department which is insufficient and essentially poor care for patient with ongoing pain such as this which should be worked up and followed by a single provider in order to elicit etiology and provide appropriate management.  He understands this and will attempt to identify a primary provider with whom he will follow.  He is appropriate for discharge    - Patient agrees to our plan and is ready and eager for discharge. Care plan, follow up plan, and reasons to return immediately to the ED were dicussed in detail and summarized as noted in the discharge instructions.      I have reviewed the nursing notes.    I have reviewed the findings,  diagnosis, plan and need for follow up with the patient.    Discharge Medication List as of 8/18/2019  6:26 AM          Final diagnoses:   Atypical chest pain       8/18/2019   [facility], [place], EMERGENCY DEPARTMENT     Ignacio Frias MD  08/19/19 0500

## 2019-08-18 NOTE — ED AVS SNAPSHOT
[facility], [place], Emergency Department  [address]  [place] [address]  Phone:  [phone]  Fax:  [phone]                                    Abhilash Gonzalez   MRN: [id number]    Department:  [facility], Emergency Department   Date of Visit:  8/18/2019           After Visit Summary Signature Page    I have received my discharge instructions, and my questions have been answered. I have discussed any challenges I see with this plan with the nurse or doctor.    ..........................................................................................................................................  Patient/Patient Representative Signature      ..........................................................................................................................................  Patient Representative Print Name and Relationship to Patient    ..................................................               ................................................  Date                                   Time    ..........................................................................................................................................  Reviewed by Signature/Title    ...................................................              ..............................................  Date                                               Time          22EPIC Rev 08/18

## 2019-08-18 NOTE — DISCHARGE INSTRUCTIONS
Please make an appointment to follow up with Your Primary Care Provider in 7 days even if entirely better.  You can call to discuss the appropriate follow up timing with your doctor.     Return to the emergency department immediately for any chest pain, back pain, shortness of breath, weakness, abdominal pain nausea, vomiting, or any other concerns as given or discussed

## 2019-08-19 LAB — INTERPRETATION ECG - MUSE: NORMAL

## 2019-08-25 ENCOUNTER — HOSPITAL ENCOUNTER (EMERGENCY)
Facility: CLINIC | Age: 64
Discharge: HOME | End: 2019-08-25
Attending: EMERGENCY MEDICINE | Admitting: EMERGENCY MEDICINE
Payer: MEDICAID

## 2019-08-25 ENCOUNTER — APPOINTMENT (OUTPATIENT)
Dept: GENERAL RADIOLOGY | Facility: CLINIC | Age: 64
End: 2019-08-25
Attending: EMERGENCY MEDICINE
Payer: MEDICAID

## 2019-08-25 VITALS
HEART RATE: 88 BPM | DIASTOLIC BLOOD PRESSURE: 89 MMHG | SYSTOLIC BLOOD PRESSURE: 145 MMHG | TEMPERATURE: 97.5 F | RESPIRATION RATE: 18 BRPM | OXYGEN SATURATION: 94 % | BODY MASS INDEX: 26.59 KG/M2 | WEIGHT: 174.9 LBS

## 2019-08-25 DIAGNOSIS — R07.9 CHEST PAIN, UNSPECIFIED TYPE: ICD-10-CM

## 2019-08-25 LAB
ANION GAP SERPL CALCULATED.3IONS-SCNC: 8 MMOL/L (ref 3–14)
BASOPHILS # BLD AUTO: 0 10E9/L (ref 0–0.2)
BASOPHILS NFR BLD AUTO: 0.2 %
BUN SERPL-MCNC: 12 MG/DL (ref 7–30)
CALCIUM SERPL-MCNC: 8.4 MG/DL (ref 8.5–10.1)
CHLORIDE SERPL-SCNC: 107 MMOL/L (ref 94–109)
CO2 SERPL-SCNC: 26 MMOL/L (ref 20–32)
CREAT SERPL-MCNC: 1.37 MG/DL (ref 0.66–1.25)
DIFFERENTIAL METHOD BLD: ABNORMAL
EOSINOPHIL # BLD AUTO: 0.1 10E9/L (ref 0–0.7)
EOSINOPHIL NFR BLD AUTO: 2.2 %
ERYTHROCYTE [DISTWIDTH] IN BLOOD BY AUTOMATED COUNT: 13.2 % (ref 10–15)
GFR SERPL CREATININE-BSD FRML MDRD: 54 ML/MIN/{1.73_M2}
GLUCOSE SERPL-MCNC: 81 MG/DL (ref 70–99)
HCT VFR BLD AUTO: 46.6 % (ref 40–53)
HGB BLD-MCNC: 15.4 G/DL (ref 13.3–17.7)
IMM GRANULOCYTES # BLD: 0 10E9/L (ref 0–0.4)
IMM GRANULOCYTES NFR BLD: 0.2 %
LYMPHOCYTES # BLD AUTO: 1.3 10E9/L (ref 0.8–5.3)
LYMPHOCYTES NFR BLD AUTO: 28.4 %
MCH RBC QN AUTO: 32.1 PG (ref 26.5–33)
MCHC RBC AUTO-ENTMCNC: 33 G/DL (ref 31.5–36.5)
MCV RBC AUTO: 97 FL (ref 78–100)
MONOCYTES # BLD AUTO: 0.4 10E9/L (ref 0–1.3)
MONOCYTES NFR BLD AUTO: 9.2 %
NEUTROPHILS # BLD AUTO: 2.7 10E9/L (ref 1.6–8.3)
NEUTROPHILS NFR BLD AUTO: 59.8 %
NRBC # BLD AUTO: 0 10*3/UL
NRBC BLD AUTO-RTO: 0 /100
PLATELET # BLD AUTO: 144 10E9/L (ref 150–450)
POTASSIUM SERPL-SCNC: 3.4 MMOL/L (ref 3.4–5.3)
RBC # BLD AUTO: 4.8 10E12/L (ref 4.4–5.9)
SODIUM SERPL-SCNC: 142 MMOL/L (ref 133–144)
TROPONIN I SERPL-MCNC: <0.015 UG/L (ref 0–0.04)
WBC # BLD AUTO: 4.5 10E9/L (ref 4–11)

## 2019-08-25 PROCEDURE — 93005 ELECTROCARDIOGRAM TRACING: CPT | Performed by: EMERGENCY MEDICINE

## 2019-08-25 PROCEDURE — 71046 X-RAY EXAM CHEST 2 VIEWS: CPT

## 2019-08-25 PROCEDURE — 93010 ELECTROCARDIOGRAM REPORT: CPT | Mod: Z6 | Performed by: EMERGENCY MEDICINE

## 2019-08-25 PROCEDURE — 25000132 ZZH RX MED GY IP 250 OP 250 PS 637: Performed by: EMERGENCY MEDICINE

## 2019-08-25 PROCEDURE — 80048 BASIC METABOLIC PNL TOTAL CA: CPT | Performed by: EMERGENCY MEDICINE

## 2019-08-25 PROCEDURE — 85025 COMPLETE CBC W/AUTO DIFF WBC: CPT | Performed by: EMERGENCY MEDICINE

## 2019-08-25 PROCEDURE — 99284 EMERGENCY DEPT VISIT MOD MDM: CPT | Mod: 25 | Performed by: EMERGENCY MEDICINE

## 2019-08-25 PROCEDURE — 25000125 ZZHC RX 250: Performed by: EMERGENCY MEDICINE

## 2019-08-25 PROCEDURE — 84484 ASSAY OF TROPONIN QUANT: CPT | Performed by: EMERGENCY MEDICINE

## 2019-08-25 RX ORDER — ASPIRIN 81 MG/1
324 TABLET, CHEWABLE ORAL ONCE
Status: COMPLETED | OUTPATIENT
Start: 2019-08-25 | End: 2019-08-25

## 2019-08-25 RX ADMIN — ASPIRIN 81 MG 324 MG: 81 TABLET ORAL at 05:28

## 2019-08-25 RX ADMIN — LIDOCAINE HYDROCHLORIDE 30 ML: 20 SOLUTION ORAL; TOPICAL at 05:28

## 2019-08-25 NOTE — ED TRIAGE NOTES
Per patient report chest pain- left anterior chest at 2000 pateint was watching tv when the chest pain began and continues to increase with SOB, dizziness. Migraines with history.

## 2019-08-25 NOTE — ED PROVIDER NOTES
"  History     Chief Complaint   Patient presents with     Chest Pain     HPI  Abhilash Gonzalez is a 64 year old male with PMH notable for cocaine abuse, tobacco use, GERD who presents to the ED with chest pain. Patient reports onset at 8 pm. He reports many past episodes of similar pain. Mild SOB recently. Pain not worsened with a deep breath. No leg pain/swelling, no hx DVT/PE, no recent immobilization, no cancer history, no exogenous estrogen. Pain has been constant but waxing and waning somewhat in intensity. Pain started when watching TV, does not seem aggravated by activity. No clear alleviating factors, other than \"pain meds help\".     I have reviewed the Medications, Allergies, Past Medical and Surgical History, and Social History in the Epic system.    Review of Systems  A complete review of systems was performed with pertinent positives and negatives noted in the HPI, and all other systems negative.     Physical Exam   BP: (!) 151/97  Pulse: 88  Heart Rate: 87  Temp: 97.5  F (36.4  C)  Resp: 18  Weight: 79.3 kg (174 lb 14.4 oz)  SpO2: 96 %    Physical Exam  General: no acute distress. Appears stated age.   HENT: MMM, no oropharyngeal lesions  Eyes: PERRL, normal sclerae  Neck: non-tender, supple  Cardio: regular rate. Regular rhythm. Extremities well perfused  Resp: Normal work of breathing, clear breath sounds  Chest/Back: no visual signs of trauma, pain not reproduced with palpation  Abdomen: no tenderness, non-distended, no rebound, no guarding  Neuro: alert and fully oriented. CN II-XII grossly intact. Grossly normal strength and sensation in all extremities.   MSK: no deformities.   Integumentary/Skin: no rash visualized, normal color  Psych: normal affect, normal behavior    ED Course      Procedures             EKG Interpretation:      Interpreted by Edgar Leon MD  Time reviewed: 0515  Symptoms at time of EKG: chest pain   Rhythm: normal sinus   Rate: normal  Axis: normal  Ectopy: " none  Conduction: normal  ST Segments/ T Waves: No ST-T wave changes  Q Waves: none  Comparison to prior: Unchanged from 8/18/2019, 8/9/2019    Clinical Impression: normal EKG    Critical Care time:  none       Labs Ordered and Resulted from Time of ED Arrival Up to the Time of Departure from the ED   CBC WITH PLATELETS DIFFERENTIAL - Abnormal; Notable for the following components:       Result Value    Platelet Count 144 (*)     All other components within normal limits   BASIC METABOLIC PANEL - Abnormal; Notable for the following components:    Creatinine 1.37 (*)     GFR Estimate 54 (*)     Calcium 8.4 (*)     All other components within normal limits   TROPONIN I            Assessments & Plan (with Medical Decision Making)   Patient presenting with chest pain. Vitals in the ED unremarkable. ECG showed NSR without evidence of acute ischemia, troponin negative, pain for > 8 hours - ACS very unlikely. Very low risk for PE by risk factors and history/exam findings. CXR without evidence of pneumothorax, pneumonia, widened mediastinum nor other visualized pathology. Chart review notable for many ED visits at multiple EDs in the past few months for similar complaint with negative work-ups each time.     The complete clinical picture is most consistent with atypical chest pain. After counseling on the diagnosis, work-up, and treatment plan, the patient was discharged to home. The patient was advised to follow-up with primary care in a few days. The patient was advised to return to the ED if worsening symptoms, or if there are any urgent/life-threatening concerns.     Final diagnoses:   Chest pain, unspecified type       --  Edgar Leon MD   Emergency Medicine     I have reviewed the nursing notes.  I have reviewed the findings, diagnosis, plan and need for follow up with the patient.  8/25/2019   [facility], EMERGENCY DEPARTMENT     Edgar Leon MD  08/25/19 0705

## 2019-08-25 NOTE — ED AVS SNAPSHOT
[facility], [place], Emergency Department  [address] [place] [address]  Phone:  [phone]                                    Abhilash Gonzalez   MRN: [id number]    Department:  [facility], Emergency Department   Date of Visit:  8/25/2019           After Visit Summary Signature Page    I have received my discharge instructions, and my questions have been answered. I have discussed any challenges I see with this plan with the nurse or doctor.    ..........................................................................................................................................  Patient/Patient Representative Signature      ..........................................................................................................................................  Patient Representative Print Name and Relationship to Patient    ..................................................               ................................................  Date                                   Time    ..........................................................................................................................................  Reviewed by Signature/Title    ...................................................              ..............................................  Date                                               Time          22EPIC Rev 08/18

## 2019-08-25 NOTE — DISCHARGE INSTRUCTIONS
Please make an appointment to follow up with [facility] (phone: [phone] in 2-4 days.     Return to the ED if you are having worsening symptoms, or any urgent/life-threatening concerns.

## 2019-08-26 LAB — INTERPRETATION ECG - MUSE: NORMAL

## 2019-09-23 ENCOUNTER — HOSPITAL ENCOUNTER (EMERGENCY)
Facility: CLINIC | Age: 64
Discharge: HOME | End: 2019-09-23
Attending: INTERNAL MEDICINE | Admitting: INTERNAL MEDICINE
Payer: COMMERCIAL

## 2019-09-23 ENCOUNTER — APPOINTMENT (OUTPATIENT)
Dept: CT IMAGING | Facility: CLINIC | Age: 64
End: 2019-09-23
Attending: INTERNAL MEDICINE
Payer: COMMERCIAL

## 2019-09-23 VITALS
TEMPERATURE: 98.2 F | WEIGHT: 174.96 LBS | SYSTOLIC BLOOD PRESSURE: 165 MMHG | HEART RATE: 94 BPM | DIASTOLIC BLOOD PRESSURE: 99 MMHG | BODY MASS INDEX: 26.6 KG/M2 | OXYGEN SATURATION: 100 %

## 2019-09-23 DIAGNOSIS — R10.84 ABDOMINAL PAIN, GENERALIZED: ICD-10-CM

## 2019-09-23 LAB
ALBUMIN SERPL-MCNC: 3.5 G/DL (ref 3.4–5)
ALBUMIN UR-MCNC: 10 MG/DL
ALP SERPL-CCNC: 78 U/L (ref 40–150)
ALT SERPL W P-5'-P-CCNC: 23 U/L (ref 0–70)
AMPHETAMINES UR QL SCN: NEGATIVE
ANION GAP SERPL CALCULATED.3IONS-SCNC: 10 MMOL/L (ref 3–14)
APPEARANCE UR: CLEAR
AST SERPL W P-5'-P-CCNC: 21 U/L (ref 0–45)
BARBITURATES UR QL: NEGATIVE
BASOPHILS # BLD AUTO: 0 10E9/L (ref 0–0.2)
BASOPHILS NFR BLD AUTO: 0.3 %
BENZODIAZ UR QL: NEGATIVE
BILIRUB DIRECT SERPL-MCNC: 0.4 MG/DL (ref 0–0.2)
BILIRUB SERPL-MCNC: 1.4 MG/DL (ref 0.2–1.3)
BILIRUB UR QL STRIP: NEGATIVE
BUN SERPL-MCNC: 15 MG/DL (ref 7–30)
CALCIUM SERPL-MCNC: 8.4 MG/DL (ref 8.5–10.1)
CANNABINOIDS UR QL SCN: NEGATIVE
CHLORIDE SERPL-SCNC: 108 MMOL/L (ref 94–109)
CO2 SERPL-SCNC: 22 MMOL/L (ref 20–32)
COCAINE UR QL: POSITIVE
COLOR UR AUTO: YELLOW
CREAT SERPL-MCNC: 1.29 MG/DL (ref 0.66–1.25)
CRP SERPL-MCNC: <2.9 MG/L (ref 0–8)
DIFFERENTIAL METHOD BLD: NORMAL
EOSINOPHIL # BLD AUTO: 0.1 10E9/L (ref 0–0.7)
EOSINOPHIL NFR BLD AUTO: 1.4 %
ERYTHROCYTE [DISTWIDTH] IN BLOOD BY AUTOMATED COUNT: 13.5 % (ref 10–15)
ERYTHROCYTE [SEDIMENTATION RATE] IN BLOOD BY WESTERGREN METHOD: 7 MM/H (ref 0–20)
ETHANOL UR QL SCN: NEGATIVE
GFR SERPL CREATININE-BSD FRML MDRD: 59 ML/MIN/{1.73_M2}
GLUCOSE BLDC GLUCOMTR-MCNC: 144 MG/DL (ref 70–99)
GLUCOSE SERPL-MCNC: 63 MG/DL (ref 70–99)
GLUCOSE UR STRIP-MCNC: NEGATIVE MG/DL
HCT VFR BLD AUTO: 47.2 % (ref 40–53)
HGB BLD-MCNC: 15.9 G/DL (ref 13.3–17.7)
HGB UR QL STRIP: ABNORMAL
IMM GRANULOCYTES # BLD: 0 10E9/L (ref 0–0.4)
IMM GRANULOCYTES NFR BLD: 0 %
KETONES UR STRIP-MCNC: 10 MG/DL
LEUKOCYTE ESTERASE UR QL STRIP: NEGATIVE
LIPASE SERPL-CCNC: 64 U/L (ref 73–393)
LYMPHOCYTES # BLD AUTO: 1.3 10E9/L (ref 0.8–5.3)
LYMPHOCYTES NFR BLD AUTO: 21.7 %
MCH RBC QN AUTO: 33 PG (ref 26.5–33)
MCHC RBC AUTO-ENTMCNC: 33.7 G/DL (ref 31.5–36.5)
MCV RBC AUTO: 98 FL (ref 78–100)
MONOCYTES # BLD AUTO: 0.5 10E9/L (ref 0–1.3)
MONOCYTES NFR BLD AUTO: 8.1 %
MUCOUS THREADS #/AREA URNS LPF: PRESENT /LPF
NEUTROPHILS # BLD AUTO: 4 10E9/L (ref 1.6–8.3)
NEUTROPHILS NFR BLD AUTO: 68.5 %
NITRATE UR QL: NEGATIVE
NRBC # BLD AUTO: 0 10*3/UL
NRBC BLD AUTO-RTO: 0 /100
OPIATES UR QL SCN: NEGATIVE
PH UR STRIP: 6.5 PH (ref 5–7)
PLATELET # BLD AUTO: 169 10E9/L (ref 150–450)
POTASSIUM SERPL-SCNC: 3.3 MMOL/L (ref 3.4–5.3)
PROT SERPL-MCNC: 7.2 G/DL (ref 6.8–8.8)
RBC # BLD AUTO: 4.82 10E12/L (ref 4.4–5.9)
RBC #/AREA URNS AUTO: 1 /HPF (ref 0–2)
SODIUM SERPL-SCNC: 140 MMOL/L (ref 133–144)
SOURCE: ABNORMAL
SP GR UR STRIP: 1.01 (ref 1–1.03)
SQUAMOUS #/AREA URNS AUTO: 1 /HPF (ref 0–1)
TRANS CELLS #/AREA URNS HPF: <1 /HPF (ref 0–1)
UROBILINOGEN UR STRIP-MCNC: 2 MG/DL (ref 0–2)
WBC # BLD AUTO: 5.9 10E9/L (ref 4–11)
WBC #/AREA URNS AUTO: 1 /HPF (ref 0–5)

## 2019-09-23 PROCEDURE — 99285 EMERGENCY DEPT VISIT HI MDM: CPT | Mod: 25 | Performed by: INTERNAL MEDICINE

## 2019-09-23 PROCEDURE — 81001 URINALYSIS AUTO W/SCOPE: CPT | Mod: XU | Performed by: INTERNAL MEDICINE

## 2019-09-23 PROCEDURE — 80048 BASIC METABOLIC PNL TOTAL CA: CPT | Performed by: EMERGENCY MEDICINE

## 2019-09-23 PROCEDURE — 74177 CT ABD & PELVIS W/CONTRAST: CPT

## 2019-09-23 PROCEDURE — 99284 EMERGENCY DEPT VISIT MOD MDM: CPT | Mod: Z6 | Performed by: INTERNAL MEDICINE

## 2019-09-23 PROCEDURE — 85025 COMPLETE CBC W/AUTO DIFF WBC: CPT | Performed by: EMERGENCY MEDICINE

## 2019-09-23 PROCEDURE — 83690 ASSAY OF LIPASE: CPT | Performed by: EMERGENCY MEDICINE

## 2019-09-23 PROCEDURE — 25000128 H RX IP 250 OP 636: Performed by: STUDENT IN AN ORGANIZED HEALTH CARE EDUCATION/TRAINING PROGRAM

## 2019-09-23 PROCEDURE — 96361 HYDRATE IV INFUSION ADD-ON: CPT | Performed by: INTERNAL MEDICINE

## 2019-09-23 PROCEDURE — 86140 C-REACTIVE PROTEIN: CPT | Performed by: EMERGENCY MEDICINE

## 2019-09-23 PROCEDURE — 96360 HYDRATION IV INFUSION INIT: CPT | Performed by: INTERNAL MEDICINE

## 2019-09-23 PROCEDURE — 00000146 ZZHCL STATISTIC GLUCOSE BY METER IP

## 2019-09-23 PROCEDURE — 86140 C-REACTIVE PROTEIN: CPT | Performed by: INTERNAL MEDICINE

## 2019-09-23 PROCEDURE — 25000128 H RX IP 250 OP 636: Performed by: INTERNAL MEDICINE

## 2019-09-23 PROCEDURE — 80076 HEPATIC FUNCTION PANEL: CPT | Performed by: EMERGENCY MEDICINE

## 2019-09-23 PROCEDURE — 80307 DRUG TEST PRSMV CHEM ANLYZR: CPT | Performed by: INTERNAL MEDICINE

## 2019-09-23 PROCEDURE — 80320 DRUG SCREEN QUANTALCOHOLS: CPT | Performed by: INTERNAL MEDICINE

## 2019-09-23 PROCEDURE — 85652 RBC SED RATE AUTOMATED: CPT | Performed by: EMERGENCY MEDICINE

## 2019-09-23 RX ORDER — IOPAMIDOL 755 MG/ML
107 INJECTION, SOLUTION INTRAVASCULAR ONCE
Status: COMPLETED | OUTPATIENT
Start: 2019-09-23 | End: 2019-09-23

## 2019-09-23 RX ADMIN — SODIUM CHLORIDE 1000 ML: 9 INJECTION, SOLUTION INTRAVENOUS at 07:33

## 2019-09-23 RX ADMIN — IOPAMIDOL 107 ML: 755 INJECTION, SOLUTION INTRAVENOUS at 08:19

## 2019-09-23 ASSESSMENT — ENCOUNTER SYMPTOMS
DIARRHEA: 1
SHORTNESS OF BREATH: 0
FREQUENCY: 1
ABDOMINAL PAIN: 1
NECK STIFFNESS: 0
BACK PAIN: 0
VOMITING: 0
FEVER: 0
HEADACHES: 0
CONFUSION: 0
COLOR CHANGE: 0
EYE REDNESS: 0
NAUSEA: 0
DIFFICULTY URINATING: 0
ARTHRALGIAS: 0

## 2019-09-23 NOTE — ED PROVIDER NOTES
[place] EMERGENCY DEPARTMENT ([facility])  September 23, 2019    History     Chief Complaint   Patient presents with     Incontinence     Abdominal Pain     HPI  Abhilash Gonzalez is a 64 year old male with a history of cocaine and marijuana abuse at least, homeless, multiple emergency department visits in multiple ED's in the [facility] area at least, presents with a 3-day history of frequent urination and not able to control bowel movement when he urinates.  He thinks he may have some fever, but no nausea or vomiting.  He has some generalized abdominal discomfort, but no back pain.  He does not endorse any numbness in the pelvic area or genital area at all.    This part of the medical record was transcribed by Nancy Cross,  Medical Scribe, from a dictation done by Gerard Cooper MD.     PAST MEDICAL HISTORY  Past Medical History:   Diagnosis Date     Back pain      Depression      DJD (degenerative joint disease)      Hypertension      Polysubstance abuse (H)      TB (tuberculosis) 2009    hx TB, states full regimine of medication taken in 2009 and xray in march 2018 looked good     TB lung, latent      PAST SURGICAL HISTORY  Past Surgical History:   Procedure Laterality Date     BRONCHOSCOPY       COLONOSCOPY  12-5-12    Repeat Colonoscopy in 5 yrs.     HAND SURGERY      right palm     MANDIBLE SURGERY  10/2004     XR KNEE SURGERY JUANCHO RIGHT       FAMILY HISTORY  Family History   Problem Relation Age of Onset     Hypertension Mother      Cerebrovascular Disease Brother      SOCIAL HISTORY  Social History     Tobacco Use     Smoking status: Current Some Day Smoker     Packs/day: 1.00     Years: 15.00     Pack years: 15.00     Types: Cigarettes     Smokeless tobacco: Never Used   Substance Use Topics     Alcohol use: Yes     Alcohol/week: 0.0 standard drinks     Comment: occasionally     MEDICATIONS  No current facility-administered medications for this encounter.      Current Outpatient Medications    Medication     albuterol (PROAIR HFA/PROVENTIL HFA/VENTOLIN HFA) 108 (90 Base) MCG/ACT Inhaler     amLODIPine (NORVASC) 5 MG tablet     cefuroxime (CEFTIN) 500 MG tablet     lisinopril (PRINIVIL/ZESTRIL) 10 MG tablet     nitroglycerin (NITROSTAT) 0.4 MG SL tablet     oxyCODONE IR (ROXICODONE) 10 MG tablet     ALLERGIES  Allergies   Allergen Reactions     Codeine Itching            Hydrocodone-Acetaminophen Itching       I have reviewed the Medications, Allergies, Past Medical and Surgical History, and Social History in the Epic system.    Review of Systems   Constitutional: Negative for fever.   HENT: Negative for congestion.    Eyes: Negative for redness.   Respiratory: Negative for shortness of breath.    Cardiovascular: Negative for chest pain.   Gastrointestinal: Positive for abdominal pain and diarrhea. Negative for nausea and vomiting.   Genitourinary: Positive for frequency. Negative for difficulty urinating.   Musculoskeletal: Negative for arthralgias, back pain and neck stiffness.   Skin: Negative for color change.   Neurological: Negative for headaches.   Psychiatric/Behavioral: Negative for confusion.       Physical Exam   BP: (!) 166/106  Pulse: 106  Heart Rate: 108  Temp: 98.4  F (36.9  C)  Weight: 79.4 kg (174 lb 15.4 oz)  SpO2: 100 %      Physical Exam  Constitutional:       General: He is not in acute distress.     Appearance: He is not diaphoretic.   HENT:      Head: Atraumatic.   Eyes:      General: No scleral icterus.     Pupils: Pupils are equal, round, and reactive to light.   Neck:      Musculoskeletal: Neck supple.   Cardiovascular:      Rate and Rhythm: Normal rate and regular rhythm.      Heart sounds: Normal heart sounds. No murmur. No friction rub. No gallop.    Pulmonary:      Effort: Pulmonary effort is normal. No respiratory distress.      Breath sounds: Normal breath sounds. No stridor. No wheezing, rhonchi or rales.   Chest:      Chest wall: No tenderness.   Abdominal:      General:  Bowel sounds are normal. There is no distension.      Palpations: Abdomen is soft. There is no mass.      Tenderness: There is no tenderness. There is no right CVA tenderness, left CVA tenderness, guarding or rebound.      Hernia: No hernia is present.   Musculoskeletal:         General: No tenderness.   Skin:     General: Skin is warm.      Findings: No rash.   Neurological:      General: No focal deficit present.   Psychiatric:         Mood and Affect: Mood normal.         Behavior: Behavior normal.         Thought Content: Thought content normal.         Judgement: Judgment normal.         ED Course        Procedures        Results for orders placed or performed during the hospital encounter of 09/23/19 (from the past 24 hour(s))   CBC with platelets differential     Status: None    Collection Time: 09/23/19  6:44 AM   Result Value Ref Range    WBC 5.9 4.0 - 11.0 10e9/L    RBC Count 4.82 4.4 - 5.9 10e12/L    Hemoglobin 15.9 13.3 - 17.7 g/dL    Hematocrit 47.2 40.0 - 53.0 %    MCV 98 78 - 100 fl    MCH 33.0 26.5 - 33.0 pg    MCHC 33.7 31.5 - 36.5 g/dL    RDW 13.5 10.0 - 15.0 %    Platelet Count 169 150 - 450 10e9/L    Diff Method Automated Method     % Neutrophils 68.5 %    % Lymphocytes 21.7 %    % Monocytes 8.1 %    % Eosinophils 1.4 %    % Basophils 0.3 %    % Immature Granulocytes 0.0 %    Nucleated RBCs 0 0 /100    Absolute Neutrophil 4.0 1.6 - 8.3 10e9/L    Absolute Lymphocytes 1.3 0.8 - 5.3 10e9/L    Absolute Monocytes 0.5 0.0 - 1.3 10e9/L    Absolute Eosinophils 0.1 0.0 - 0.7 10e9/L    Absolute Basophils 0.0 0.0 - 0.2 10e9/L    Abs Immature Granulocytes 0.0 0 - 0.4 10e9/L    Absolute Nucleated RBC 0.0    Basic metabolic panel     Status: Abnormal    Collection Time: 09/23/19  6:44 AM   Result Value Ref Range    Sodium 140 133 - 144 mmol/L    Potassium 3.3 (L) 3.4 - 5.3 mmol/L    Chloride 108 94 - 109 mmol/L    Carbon Dioxide 22 20 - 32 mmol/L    Anion Gap 10 3 - 14 mmol/L    Glucose 63 (L) 70 - 99 mg/dL     Urea Nitrogen 15 7 - 30 mg/dL    Creatinine 1.29 (H) 0.66 - 1.25 mg/dL    GFR Estimate 59 (L) >60 mL/min/[1.73_m2]    GFR Estimate If Black 68 >60 mL/min/[1.73_m2]    Calcium 8.4 (L) 8.5 - 10.1 mg/dL   Lipase     Status: Abnormal    Collection Time: 09/23/19  6:44 AM   Result Value Ref Range    Lipase 64 (L) 73 - 393 U/L   Hepatic panel     Status: Abnormal    Collection Time: 09/23/19  6:44 AM   Result Value Ref Range    Bilirubin Direct 0.4 (H) 0.0 - 0.2 mg/dL    Bilirubin Total 1.4 (H) 0.2 - 1.3 mg/dL    Albumin 3.5 3.4 - 5.0 g/dL    Protein Total 7.2 6.8 - 8.8 g/dL    Alkaline Phosphatase 78 40 - 150 U/L    ALT 23 0 - 70 U/L    AST 21 0 - 45 U/L   CRP inflammation     Status: None    Collection Time: 09/23/19  6:44 AM   Result Value Ref Range    CRP Inflammation <2.9 0.0 - 8.0 mg/L   Erythrocyte sedimentation rate auto     Status: None    Collection Time: 09/23/19  6:44 AM   Result Value Ref Range    Sed Rate 7 0 - 20 mm/h   UA reflex to Microscopic and Culture     Status: Abnormal    Collection Time: 09/23/19  7:42 AM   Result Value Ref Range    Color Urine Yellow     Appearance Urine Clear     Glucose Urine Negative NEG^Negative mg/dL    Bilirubin Urine Negative NEG^Negative    Ketones Urine 10 (A) NEG^Negative mg/dL    Specific Gravity Urine 1.013 1.003 - 1.035    Blood Urine Trace (A) NEG^Negative    pH Urine 6.5 5.0 - 7.0 pH    Protein Albumin Urine 10 (A) NEG^Negative mg/dL    Urobilinogen mg/dL 2.0 0.0 - 2.0 mg/dL    Nitrite Urine Negative NEG^Negative    Leukocyte Esterase Urine Negative NEG^Negative    Source Midstream Urine     RBC Urine 1 0 - 2 /HPF    WBC Urine 1 0 - 5 /HPF    Squamous Epithelial /HPF Urine 1 0 - 1 /HPF    Transitional Epi <1 0 - 1 /HPF    Mucous Urine Present (A) NEG^Negative /LPF   Drug abuse screen 6 urine (chem dep)     Status: Abnormal    Collection Time: 09/23/19  7:42 AM   Result Value Ref Range    Amphetamine Qual Urine Negative NEG^Negative    Barbiturates Qual Urine  Negative NEG^Negative    Benzodiazepine Qual Urine Negative NEG^Negative    Cannabinoids Qual Urine Negative NEG^Negative    Cocaine Qual Urine Positive (A) NEG^Negative    Ethanol Qual Urine Negative NEG^Negative    Opiates Qualitative Urine Negative NEG^Negative   CT Abdomen Pelvis w Contrast     Status: None    Collection Time: 09/23/19  8:30 AM    Narrative    EXAMINATION: CT ABDOMEN PELVIS W CONTRAST, 9/23/2019 8:30 AM    TECHNIQUE:  Helical CT of the abdomen and pelvis with contrast.   Coronal and sagittal reformatted images were reviewed at the  workstation for further assessment.    COMPARISON: 7/15/2019    HISTORY: Abd pain, acute, generalized, with fever    FINDINGS:     Lines/Tubes: None.     Chest: No cardiomegaly or pericardial effusion. The included thoracic  aorta is of normal caliber. The distal esophagus is unremarkable. The  lung bases are clear without focal airspace/interstitial consolidative  opacity, pleural effusion or pneumothorax.    Stomach: No conspicuous gastric mucosal thickening or enhancing mass.  Hepatobiliary: Scattered low-density foci in the hepatic parenchyma  largest measuring up to 14 mm consistent with simple hepatic cysts.  Hepatic steatosis. The portal and hepatic vessels are unremarkable. No  biliary ductal dilatation. No cholelithiasis or choledocholithiasis,  gallbladder wall thickening or pericholecystic fluid.   Spleen: No splenomegaly.  Pancreas: No focal masses or ductal dilatation.  Adrenals: No adrenal nodules.   Lymph Nodes: No conspicuous lymphadenopathy or enlargement by size  criteria.   Vessels: The abdominal aorta and its major branches are patent. No  abdominal aortic aneurysm.   Small and large bowel: No bowel dilatation or wall thickening. No  areas of hyper or hypoenhancement. The appendix is within normal  limits.  Abdominal wall: No ventral or inguinal hernia  Peritoneum: No free air or fluid.  Kidney/Ureters: No hydronephrosis, stones or solid mass  lesions  Pelvic Organs/Bladder: No bladder wall deformity or thickening.  Prostatic base nodule base causing upper pressure on the inferior wall  of bladder (series 5, image 55; series 4, image 51) similar to  7/15/2019. The prostate is not enlarged measuring 46 mm and the  transverse dimension.    Bones and Soft Tissues: No acute or aggressive osseous lesions. Grade  1 retrolisthesis of L5 with respect to S1.       Impression    IMPRESSION:  No acute findings in the abdomen or pelvis.     I have personally reviewed the examination and initial interpretation  and I agree with the findings.    DANI LLAMAS, DO   Glucose by meter     Status: Abnormal    Collection Time: 09/23/19  8:36 AM   Result Value Ref Range    Glucose 144 (H) 70 - 99 mg/dL           Labs Ordered and Resulted from Time of ED Arrival Up to the Time of Departure from the ED   BASIC METABOLIC PANEL - Abnormal; Notable for the following components:       Result Value    Potassium 3.3 (*)     Glucose 63 (*)     Creatinine 1.29 (*)     GFR Estimate 59 (*)     Calcium 8.4 (*)     All other components within normal limits   LIPASE - Abnormal; Notable for the following components:    Lipase 64 (*)     All other components within normal limits   UA MACROSCOPIC WITH REFLEX TO MICRO AND CULTURE - Abnormal; Notable for the following components:    Ketones Urine 10 (*)     Blood Urine Trace (*)     Protein Albumin Urine 10 (*)     Mucous Urine Present (*)     All other components within normal limits   DRUG ABUSE SCREEN 6 CHEM DEP URINE ([facility]) - Abnormal; Notable for the following components:    Cocaine Qual Urine Positive (*)     All other components within normal limits   HEPATIC PANEL - Abnormal; Notable for the following components:    Bilirubin Direct 0.4 (*)     Bilirubin Total 1.4 (*)     All other components within normal limits   GLUCOSE BY METER - Abnormal; Notable for the following components:    Glucose 144 (*)     All other components within  normal limits   CBC WITH PLATELETS DIFFERENTIAL   CRP INFLAMMATION   ERYTHROCYTE SEDIMENTATION RATE AUTO            Assessments & Plan (with Medical Decision Making)  abd pain diffuse and stated incontinent but no stool on clothes, in the pt homeless, very frequent ED visits at multiple ED's, no saddle anesthesia, no LBP, no F/C, Labs UA CT abd all neg for any acute pathologies, tolerating po here, will discharge and follow up with one of the Primary Care Clinics.       I have reviewed the nursing notes.    I have reviewed the findings, diagnosis, plan and need for follow up with the patient.    Discharge Medication List as of 9/23/2019 10:13 AM          Final diagnoses:   Abdominal pain, generalized       9/23/2019   [facility], [place], EMERGENCY DEPARTMENT     Gerard Cooper MD  09/23/19 9020

## 2019-09-23 NOTE — ED AVS SNAPSHOT
[facility], [place], Emergency Department  [address] [place] [id number]  Phone:  [phone]                                    Abhilash Gonzalez   MRN: [id number]    Department:  [facility], Emergency Department   Date of Visit:  9/23/2019           After Visit Summary Signature Page    I have received my discharge instructions, and my questions have been answered. I have discussed any challenges I see with this plan with the nurse or doctor.    ..........................................................................................................................................  Patient/Patient Representative Signature      ..........................................................................................................................................  Patient Representative Print Name and Relationship to Patient    ..................................................               ................................................  Date                                   Time    ..........................................................................................................................................  Reviewed by Signature/Title    ...................................................              ..............................................  Date                                               Time          22EPIC Rev 08/18

## 2019-09-23 NOTE — DISCHARGE INSTRUCTIONS

## 2019-09-23 NOTE — ED TRIAGE NOTES
Pt arrives with complaints of loose uncontrolled stool when he urinates, he also has abdominal pain, and he states for the past three days he coughed up blood. He says he is urinating all night for the past 6 months and it keeps him up.

## 2019-10-10 ENCOUNTER — HOSPITAL ENCOUNTER (EMERGENCY)
Facility: CLINIC | Age: 64
Discharge: HOME | End: 2019-10-10
Payer: MEDICAID

## 2019-10-10 VITALS
HEART RATE: 105 BPM | TEMPERATURE: 98.2 F | WEIGHT: 177.69 LBS | SYSTOLIC BLOOD PRESSURE: 153 MMHG | DIASTOLIC BLOOD PRESSURE: 94 MMHG | BODY MASS INDEX: 27.02 KG/M2 | OXYGEN SATURATION: 96 % | RESPIRATION RATE: 18 BRPM

## 2019-10-10 NOTE — ED TRIAGE NOTES
Pt presents ambulatory to triage from home. Pt states for past week has had product vie cough, headache, and facial pressure. Pt states for past 3 days had began having lower back pain that is worse, has hx herniated disks. Has hx HTN.

## 2019-10-10 NOTE — ED NOTES
Pt states he is leaving and not like to wait to seen. Pt refused to sign declination form.   [Insect Bites] : insect bites [Negative] : Genitourinary

## 2019-10-18 ENCOUNTER — APPOINTMENT (OUTPATIENT)
Dept: GENERAL RADIOLOGY | Facility: CLINIC | Age: 64
End: 2019-10-18
Attending: EMERGENCY MEDICINE
Payer: COMMERCIAL

## 2019-10-18 ENCOUNTER — HOSPITAL ENCOUNTER (EMERGENCY)
Facility: CLINIC | Age: 64
Discharge: HOME | End: 2019-10-18
Attending: EMERGENCY MEDICINE | Admitting: EMERGENCY MEDICINE
Payer: COMMERCIAL

## 2019-10-18 VITALS
DIASTOLIC BLOOD PRESSURE: 106 MMHG | HEART RATE: 97 BPM | BODY MASS INDEX: 26.61 KG/M2 | OXYGEN SATURATION: 93 % | TEMPERATURE: 97.5 F | SYSTOLIC BLOOD PRESSURE: 168 MMHG | WEIGHT: 175 LBS | RESPIRATION RATE: 22 BRPM

## 2019-10-18 DIAGNOSIS — R07.89 OTHER CHEST PAIN: ICD-10-CM

## 2019-10-18 DIAGNOSIS — R07.9 CHEST PAIN, UNSPECIFIED TYPE: ICD-10-CM

## 2019-10-18 DIAGNOSIS — R94.31 NONSPECIFIC ABNORMAL ELECTROCARDIOGRAM (ECG) (EKG): ICD-10-CM

## 2019-10-18 DIAGNOSIS — F17.210 CIGARETTE SMOKER: ICD-10-CM

## 2019-10-18 DIAGNOSIS — R51.9 NONINTRACTABLE HEADACHE, UNSPECIFIED CHRONICITY PATTERN, UNSPECIFIED HEADACHE TYPE: ICD-10-CM

## 2019-10-18 LAB
ALBUMIN SERPL-MCNC: 3.8 G/DL (ref 3.4–5)
ALP SERPL-CCNC: 74 U/L (ref 40–150)
ALT SERPL W P-5'-P-CCNC: 21 U/L (ref 0–70)
ANION GAP SERPL CALCULATED.3IONS-SCNC: 11 MMOL/L (ref 3–14)
AST SERPL W P-5'-P-CCNC: 16 U/L (ref 0–45)
BASOPHILS # BLD AUTO: 0 10E9/L (ref 0–0.2)
BASOPHILS NFR BLD AUTO: 0.2 %
BILIRUB SERPL-MCNC: 1 MG/DL (ref 0.2–1.3)
BUN SERPL-MCNC: 12 MG/DL (ref 7–30)
CALCIUM SERPL-MCNC: 8.8 MG/DL (ref 8.5–10.1)
CHLORIDE SERPL-SCNC: 105 MMOL/L (ref 94–109)
CO2 SERPL-SCNC: 24 MMOL/L (ref 20–32)
CREAT SERPL-MCNC: 1.26 MG/DL (ref 0.66–1.25)
DIFFERENTIAL METHOD BLD: NORMAL
EOSINOPHIL # BLD AUTO: 0.1 10E9/L (ref 0–0.7)
EOSINOPHIL NFR BLD AUTO: 1.8 %
ERYTHROCYTE [DISTWIDTH] IN BLOOD BY AUTOMATED COUNT: 12.7 % (ref 10–15)
GFR SERPL CREATININE-BSD FRML MDRD: 60 ML/MIN/{1.73_M2}
GLUCOSE SERPL-MCNC: 67 MG/DL (ref 70–99)
HCT VFR BLD AUTO: 49.6 % (ref 40–53)
HGB BLD-MCNC: 16.6 G/DL (ref 13.3–17.7)
IMM GRANULOCYTES # BLD: 0 10E9/L (ref 0–0.4)
IMM GRANULOCYTES NFR BLD: 0.2 %
INTERPRETATION ECG - MUSE: NORMAL
LYMPHOCYTES # BLD AUTO: 1 10E9/L (ref 0.8–5.3)
LYMPHOCYTES NFR BLD AUTO: 18.2 %
MCH RBC QN AUTO: 32.4 PG (ref 26.5–33)
MCHC RBC AUTO-ENTMCNC: 33.5 G/DL (ref 31.5–36.5)
MCV RBC AUTO: 97 FL (ref 78–100)
MONOCYTES # BLD AUTO: 0.5 10E9/L (ref 0–1.3)
MONOCYTES NFR BLD AUTO: 9.5 %
NEUTROPHILS # BLD AUTO: 4 10E9/L (ref 1.6–8.3)
NEUTROPHILS NFR BLD AUTO: 70.1 %
NRBC # BLD AUTO: 0 10*3/UL
NRBC BLD AUTO-RTO: 0 /100
PLATELET # BLD AUTO: 180 10E9/L (ref 150–450)
POTASSIUM SERPL-SCNC: 3.3 MMOL/L (ref 3.4–5.3)
PROT SERPL-MCNC: 7.6 G/DL (ref 6.8–8.8)
RBC # BLD AUTO: 5.12 10E12/L (ref 4.4–5.9)
SODIUM SERPL-SCNC: 140 MMOL/L (ref 133–144)
TROPONIN I SERPL-MCNC: <0.015 UG/L (ref 0–0.04)
WBC # BLD AUTO: 5.7 10E9/L (ref 4–11)

## 2019-10-18 PROCEDURE — 93005 ELECTROCARDIOGRAM TRACING: CPT

## 2019-10-18 PROCEDURE — 84484 ASSAY OF TROPONIN QUANT: CPT | Performed by: EMERGENCY MEDICINE

## 2019-10-18 PROCEDURE — 25000132 ZZH RX MED GY IP 250 OP 250 PS 637: Performed by: EMERGENCY MEDICINE

## 2019-10-18 PROCEDURE — 25000128 H RX IP 250 OP 636: Performed by: EMERGENCY MEDICINE

## 2019-10-18 PROCEDURE — 80053 COMPREHEN METABOLIC PANEL: CPT | Performed by: EMERGENCY MEDICINE

## 2019-10-18 PROCEDURE — 96375 TX/PRO/DX INJ NEW DRUG ADDON: CPT

## 2019-10-18 PROCEDURE — 96374 THER/PROPH/DIAG INJ IV PUSH: CPT

## 2019-10-18 PROCEDURE — 93010 ELECTROCARDIOGRAM REPORT: CPT | Mod: Z6 | Performed by: EMERGENCY MEDICINE

## 2019-10-18 PROCEDURE — 99285 EMERGENCY DEPT VISIT HI MDM: CPT | Mod: 25 | Performed by: EMERGENCY MEDICINE

## 2019-10-18 PROCEDURE — 71046 X-RAY EXAM CHEST 2 VIEWS: CPT

## 2019-10-18 PROCEDURE — 85025 COMPLETE CBC W/AUTO DIFF WBC: CPT | Performed by: EMERGENCY MEDICINE

## 2019-10-18 PROCEDURE — 99285 EMERGENCY DEPT VISIT HI MDM: CPT | Mod: 25

## 2019-10-18 RX ORDER — ASPIRIN 81 MG/1
324 TABLET, CHEWABLE ORAL ONCE
Status: DISCONTINUED | OUTPATIENT
Start: 2019-10-18 | End: 2019-10-18

## 2019-10-18 RX ORDER — ASPIRIN 81 MG/1
162 TABLET, CHEWABLE ORAL ONCE
Status: COMPLETED | OUTPATIENT
Start: 2019-10-18 | End: 2019-10-18

## 2019-10-18 RX ORDER — DIPHENHYDRAMINE HYDROCHLORIDE 50 MG/ML
25 INJECTION INTRAMUSCULAR; INTRAVENOUS ONCE
Status: COMPLETED | OUTPATIENT
Start: 2019-10-18 | End: 2019-10-18

## 2019-10-18 RX ADMIN — PROCHLORPERAZINE EDISYLATE 10 MG: 5 INJECTION INTRAMUSCULAR; INTRAVENOUS at 01:55

## 2019-10-18 RX ADMIN — ASPIRIN 81 MG CHEWABLE TABLET 162 MG: 81 TABLET CHEWABLE at 01:55

## 2019-10-18 RX ADMIN — DIPHENHYDRAMINE HYDROCHLORIDE 25 MG: 50 INJECTION, SOLUTION INTRAMUSCULAR; INTRAVENOUS at 01:55

## 2019-10-18 ASSESSMENT — ENCOUNTER SYMPTOMS
WEAKNESS: 1
NUMBNESS: 1
HEADACHES: 1
VOMITING: 0
DIAPHORESIS: 0

## 2019-10-18 NOTE — ED TRIAGE NOTES
Pt c/o migraines for past 3 months, and chest pain for 5+ years. States the increase in pain is what brought him in to the ED today. Endorses SOB. States he has a heart history but cannot recall, states he was prescribed nitroglycerin. 's in triage. A&Ox4

## 2019-10-18 NOTE — ED AVS SNAPSHOT
[facility], [place], Emergency Department  [address] [place] [id number]  Phone:  [phone]                                    Abhilash oGnzalez   MRN: [id number]    Department:  [facility], Emergency Department   Date of Visit:  10/18/2019           After Visit Summary Signature Page    I have received my discharge instructions, and my questions have been answered. I have discussed any challenges I see with this plan with the nurse or doctor.    ..........................................................................................................................................  Patient/Patient Representative Signature      ..........................................................................................................................................  Patient Representative Print Name and Relationship to Patient    ..................................................               ................................................  Date                                   Time    ..........................................................................................................................................  Reviewed by Signature/Title    ...................................................              ..............................................  Date                                               Time          22EPIC Rev 08/18

## 2019-10-18 NOTE — DISCHARGE INSTRUCTIONS
Please make an appointment to follow up with [facility] (phone: [phone] in 7-10 days to discuss your ongoing chest pain and headaches.    If Abhilash has discomfort from fever or other pain, he can have:  Acetaminophen (Tylenol) every 6 hours as needed. His dose is:    2 regular strength tabs (650 mg)                                     (43.2+ kg/96+ lb)    NOTE: If your acetaminophen (Tylenol) came with a dropper marked with 0.4 and 0.8 ml, call us ([phone]) or check with your doctor about the dose before using it.     AND/OR      Ibuprofen (Advil, Motrin) every 6 hours as needed. His/her dose is:    2 regular strength tabs (400 mg)                                                                         (40-60 kg/ lb)

## 2019-10-18 NOTE — ED PROVIDER NOTES
"      [place] EMERGENCY DEPARTMENT ([facility])  October 18, 2019    History     Chief Complaint   Patient presents with     Headache     Chest Pain     HPI  Abhilash Gonzalez is a 64 year old male with a history of tuberculosis (2009), HTN, and polysubstance use disorder who presents to the ED for evaluation of chest pain and a headache. Patient reports he has had chest pain \"for years\". He states the pain is located on the left side of his chest and radiates down his left leg. He reports he started getting migraines 5 months ago, and usually takes Oxycontin to manage his headaches. Patient's symptoms started \"earlier today\". He reports he took 2 baby Aspirin. He denies diaphoresis or vomiting. Patient reports he has noticed weakness and numbness in his right arm, but does not specify when he has felt this. He denies recent travel or airplane trips. He denies previous history of DVT/PE. Patient notes he had pneumonia a month ago. He states he smokes cigarettes.  Jason cocaine use.    PAST MEDICAL HISTORY  Past Medical History:   Diagnosis Date     Back pain      Depression      DJD (degenerative joint disease)      Hypertension      Polysubstance abuse (H)      TB (tuberculosis) 2009    hx TB, states full regimine of medication taken in 2009 and xray in march 2018 looked good     TB lung, latent      PAST SURGICAL HISTORY  Past Surgical History:   Procedure Laterality Date     BRONCHOSCOPY       COLONOSCOPY  12-5-12    Repeat Colonoscopy in 5 yrs.     HAND SURGERY      right palm     MANDIBLE SURGERY  10/2004     XR KNEE SURGERY JUANCHO RIGHT       FAMILY HISTORY  Family History   Problem Relation Age of Onset     Hypertension Mother      Cerebrovascular Disease Brother      SOCIAL HISTORY  Social History     Tobacco Use     Smoking status: Current Some Day Smoker     Packs/day: 1.00     Years: 15.00     Pack years: 15.00     Types: Cigarettes     Smokeless tobacco: Never Used   Substance Use Topics     " Alcohol use: Yes     Alcohol/week: 0.0 standard drinks     Comment: occasionally     MEDICATIONS  Current Facility-Administered Medications   Medication     aspirin (ASA) chewable tablet 162 mg     diphenhydrAMINE (BENADRYL) injection 25 mg     prochlorperazine (COMPAZINE) injection 10 mg     Current Outpatient Medications   Medication     albuterol (PROAIR HFA/PROVENTIL HFA/VENTOLIN HFA) 108 (90 Base) MCG/ACT Inhaler     amLODIPine (NORVASC) 5 MG tablet     cefuroxime (CEFTIN) 500 MG tablet     lisinopril (PRINIVIL/ZESTRIL) 10 MG tablet     nitroglycerin (NITROSTAT) 0.4 MG SL tablet     ALLERGIES  Allergies   Allergen Reactions     Hydrocodone-Acetaminophen Itching       I have reviewed the Medications, Allergies, Past Medical and Surgical History, and Social History in the Epic system.    Review of Systems   Constitutional: Negative for diaphoresis.   Cardiovascular: Positive for chest pain.   Gastrointestinal: Negative for vomiting.   Neurological: Positive for weakness (right arm), numbness (right arm) and headaches.   All other systems reviewed and are negative.      Physical Exam   BP: (!) 144/95  Heart Rate: 111  Temp: 97.5  F (36.4  C)  Resp: 18  SpO2: 97 %      Physical Exam     Physical Exam   Constitutional: oriented to person, place, and time. appears well-developed and well-nourished.   HENT:   Head: Normocephalic and atraumatic.   Neck: Normal range of motion. No nuchal rigidity.  Pulmonary/Chest: Effort normal. No respiratory distress.   Cardiac: No murmurs, rubs, gallops. RRR.  Radial pulses symmetric.  Pedal pulses intact.  Abdominal: Abdomen soft, nontender, nondistended. No rebound tenderness.  MSK: Long bones without deformity or evidence of trauma.  No swelling in the lower extremities.  No tenderness palpation of the calves.  Neurological: alert and oriented to person, place, and time. Gait intact.  Strength 5 out of 5 in the upper and lower extremities and symmetric.  Sensation to light touch  grossly intact in all extremities.  Cranial nerves II through XII intact.  Skin: Skin is warm and dry.   Psychiatric:  normal mood and affect.  behavior is normal. Thought content normal.       ED Course        Procedures             EKG Interpretation:      Interpreted by Dre Henry MD  Time reviewed: 0155  Symptoms at time of EKG: chest pain   Rhythm: sinus tachycardia  Rate: Tachycardia  Axis: Normal  Ectopy: none  Conduction: prolonged qtc, chronic  ST Segments/ T Waves: No ST elevations or ST depression concerning for ischemia or infarction compared to prior  Q Waves: none  Comparison to prior: Unchanged    Clinical Impression: Prolonged QTC which is similar to prior EKG, no new changes concerning for ischemia or infarction      Results for orders placed or performed during the hospital encounter of 10/18/19   XR Chest 2 Views    Narrative     No acute cardiopulmonary abnormality.   CBC with platelets differential   Result Value Ref Range    WBC 5.7 4.0 - 11.0 10e9/L    RBC Count 5.12 4.4 - 5.9 10e12/L    Hemoglobin 16.6 13.3 - 17.7 g/dL    Hematocrit 49.6 40.0 - 53.0 %    MCV 97 78 - 100 fl    MCH 32.4 26.5 - 33.0 pg    MCHC 33.5 31.5 - 36.5 g/dL    RDW 12.7 10.0 - 15.0 %    Platelet Count 180 150 - 450 10e9/L    Diff Method Automated Method     % Neutrophils 70.1 %    % Lymphocytes 18.2 %    % Monocytes 9.5 %    % Eosinophils 1.8 %    % Basophils 0.2 %    % Immature Granulocytes 0.2 %    Nucleated RBCs 0 0 /100    Absolute Neutrophil 4.0 1.6 - 8.3 10e9/L    Absolute Lymphocytes 1.0 0.8 - 5.3 10e9/L    Absolute Monocytes 0.5 0.0 - 1.3 10e9/L    Absolute Eosinophils 0.1 0.0 - 0.7 10e9/L    Absolute Basophils 0.0 0.0 - 0.2 10e9/L    Abs Immature Granulocytes 0.0 0 - 0.4 10e9/L    Absolute Nucleated RBC 0.0    Comprehensive metabolic panel   Result Value Ref Range    Sodium 140 133 - 144 mmol/L    Potassium 3.3 (L) 3.4 - 5.3 mmol/L    Chloride 105 94 - 109 mmol/L    Carbon Dioxide 24 20 - 32 mmol/L     Anion Gap 11 3 - 14 mmol/L    Glucose 67 (L) 70 - 99 mg/dL    Urea Nitrogen 12 7 - 30 mg/dL    Creatinine 1.26 (H) 0.66 - 1.25 mg/dL    GFR Estimate 60 (L) >60 mL/min/[1.73_m2]    GFR Estimate If Black 70 >60 mL/min/[1.73_m2]    Calcium 8.8 8.5 - 10.1 mg/dL    Bilirubin Total 1.0 0.2 - 1.3 mg/dL    Albumin 3.8 3.4 - 5.0 g/dL    Protein Total 7.6 6.8 - 8.8 g/dL    Alkaline Phosphatase 74 40 - 150 U/L    ALT 21 0 - 70 U/L    AST 16 0 - 45 U/L   Troponin I   Result Value Ref Range    Troponin I ES <0.015 0.000 - 0.045 ug/L   EKG 12 lead   Result Value Ref Range    Interpretation ECG Click View Image link to view waveform and result             Labs Ordered and Resulted from Time of ED Arrival Up to the Time of Departure from the ED - No data to display         Assessments & Plan (with Medical Decision Making)   MDM  Patient presenting with chest pain that is been present for 5 years.  He is also had migraines that have been going on for 5 months.  He says OxyContin helps his headaches and chest pain.  Very low suspicion for ACS due to duration of chest pain in addition to frequent negative work-ups.  Very unlikely dissection due to lack of tearing, ripping pain or neurologic symptoms.  Unlikely pulmonary embolism.  No shortness of breath, leg swelling, travel, immobilization, hemoptysis.  Headache most likely migraine or tension headache.  He has no injuries or symptoms suggesting intercranial hemorrhage, will defer head CT at this point.  No fevers, patient nontoxic, very unlikely meningitis or encephalitis.    Re eval: Headache is gone the patient is sleeping comfortably.  He has no acute complaints on repeat evaluation.  EKG shows stable QTC prolongation in addition to no new findings concerning for ischemia or infarction.  Initial troponin is negative, as the patient has no pain now and the pain is been going on for months and do not feel repeat is necessary. HEART score places patient in lower risk group at 3 This  is similar to patient's prior visits to the emergency department.  The patient will be discharged.  Recommended following up with primary doctor, he is given a phone number for this.  Does not appear that he has a primary doctor.  He will return if symptoms are worsening.  Chest x-ray is clear.  I have reviewed the nursing notes.    I have reviewed the findings, diagnosis, plan and need for follow up with the patient.    New Prescriptions    No medications on file       Final diagnoses:   Chest pain, unspecified type   Nonintractable headache, unspecified chronicity pattern, unspecified headache type     I, Nancy Cross, am serving as a trained medical scribe to document services personally performed by Dre Henry MD, based on the provider's statements to me.      I, Dre Henry MD, was physically present and have reviewed and verified the accuracy of this note documented by Nancy Cross.     10/18/2019   [facility], [place], EMERGENCY DEPARTMENT     Dre Henry MD  10/18/19 0344       Dre Henry MD  10/18/19 0346

## 2019-10-28 ENCOUNTER — DOCUMENTATION ONLY (OUTPATIENT)
Dept: CARE COORDINATION | Facility: CLINIC | Age: 64
End: 2019-10-28

## 2019-10-29 ENCOUNTER — OFFICE VISIT (OUTPATIENT)
Dept: INTERNAL MEDICINE | Facility: CLINIC | Age: 64
End: 2019-10-29
Payer: COMMERCIAL

## 2019-10-29 VITALS
SYSTOLIC BLOOD PRESSURE: 152 MMHG | WEIGHT: 178 LBS | HEART RATE: 110 BPM | OXYGEN SATURATION: 96 % | BODY MASS INDEX: 27.06 KG/M2 | DIASTOLIC BLOOD PRESSURE: 120 MMHG

## 2019-10-29 DIAGNOSIS — I10 BENIGN ESSENTIAL HYPERTENSION: ICD-10-CM

## 2019-10-29 DIAGNOSIS — J20.9 ACUTE BRONCHITIS, UNSPECIFIED ORGANISM: ICD-10-CM

## 2019-10-29 DIAGNOSIS — M51.369 DDD (DEGENERATIVE DISC DISEASE), LUMBAR: ICD-10-CM

## 2019-10-29 DIAGNOSIS — Z00.00 HEALTHCARE MAINTENANCE: ICD-10-CM

## 2019-10-29 DIAGNOSIS — F14.10 COCAINE ABUSE (H): ICD-10-CM

## 2019-10-29 DIAGNOSIS — R79.89 ELEVATED SERUM CREATININE: ICD-10-CM

## 2019-10-29 DIAGNOSIS — R07.89 OTHER CHEST PAIN: ICD-10-CM

## 2019-10-29 DIAGNOSIS — I10 ESSENTIAL HYPERTENSION: ICD-10-CM

## 2019-10-29 DIAGNOSIS — R51.9 CHRONIC DAILY HEADACHE: Primary | ICD-10-CM

## 2019-10-29 DIAGNOSIS — E87.6 HYPOKALEMIA: ICD-10-CM

## 2019-10-29 LAB
ANION GAP SERPL CALCULATED.3IONS-SCNC: 3 MMOL/L (ref 3–14)
BUN SERPL-MCNC: 15 MG/DL (ref 7–30)
CALCIUM SERPL-MCNC: 9.1 MG/DL (ref 8.5–10.1)
CHLORIDE SERPL-SCNC: 106 MMOL/L (ref 94–109)
CHOLEST SERPL-MCNC: 174 MG/DL
CO2 SERPL-SCNC: 29 MMOL/L (ref 20–32)
CREAT SERPL-MCNC: 1.22 MG/DL (ref 0.66–1.25)
CREAT UR-MCNC: 143 MG/DL
GFR SERPL CREATININE-BSD FRML MDRD: 63 ML/MIN/{1.73_M2}
GLUCOSE SERPL-MCNC: 83 MG/DL (ref 70–99)
HBA1C MFR BLD: 5.5 % (ref 0–5.6)
HCV AB SERPL QL IA: NONREACTIVE
HDLC SERPL-MCNC: 67 MG/DL
HIV 1+2 AB+HIV1 P24 AG SERPL QL IA: NONREACTIVE
LDLC SERPL CALC-MCNC: 92 MG/DL
NONHDLC SERPL-MCNC: 107 MG/DL
POTASSIUM SERPL-SCNC: 3.6 MMOL/L (ref 3.4–5.3)
PROT UR-MCNC: 0.37 G/L
PROT/CREAT 24H UR: 0.26 G/G CR (ref 0–0.2)
SODIUM SERPL-SCNC: 138 MMOL/L (ref 133–144)
TRIGL SERPL-MCNC: 78 MG/DL

## 2019-10-29 RX ORDER — AMLODIPINE BESYLATE 5 MG/1
5 TABLET ORAL DAILY
Qty: 90 TABLET | Refills: 3 | Status: SHIPPED | OUTPATIENT
Start: 2019-10-29 | End: 2023-01-06

## 2019-10-29 RX ORDER — ALBUTEROL SULFATE 90 UG/1
2 AEROSOL, METERED RESPIRATORY (INHALATION) EVERY 4 HOURS PRN
Qty: 1 INHALER | Refills: 11 | Status: SHIPPED | OUTPATIENT
Start: 2019-10-29

## 2019-10-29 RX ORDER — PREDNISONE 20 MG/1
20 TABLET ORAL DAILY
Qty: 10 TABLET | Refills: 0 | Status: SHIPPED | OUTPATIENT
Start: 2019-10-29 | End: 2020-02-17

## 2019-10-29 RX ORDER — DOXYCYCLINE 100 MG/1
100 CAPSULE ORAL 2 TIMES DAILY
Qty: 10 CAPSULE | Refills: 0 | Status: SHIPPED | OUTPATIENT
Start: 2019-10-29

## 2019-10-29 RX ORDER — AZITHROMYCIN 250 MG/1
TABLET, FILM COATED ORAL
Qty: 6 TABLET | Refills: 0 | Status: SHIPPED | OUTPATIENT
Start: 2019-10-29 | End: 2019-10-29 | Stop reason: SINTOL

## 2019-10-29 RX ORDER — LISINOPRIL 10 MG/1
10 TABLET ORAL DAILY
Qty: 90 TABLET | Refills: 3 | Status: SHIPPED | OUTPATIENT
Start: 2019-10-29

## 2019-10-29 NOTE — NURSING NOTE
Chief Complaint   Patient presents with     Establish Care     Pain     pain in lower back with slipped discs. Been in and out of hospital for chest pain, migraines, and back pain     Kimberly Nissen, EMT at 7:08 AM on 10/29/2019

## 2019-10-29 NOTE — PROGRESS NOTES
"  PRIMARY CARE CENTER         HPI:       Abhilash Gonzalez is a 64 year old male with a PMH of chronic low back pain, degenerative disc disease of lumbar spine, HTN, depression, cocaine abuse, tobacco abuse and history of homelessness who presents today to establish care. Patient has had numerous ED presentations and several hospitalizations for chest pain, back pain, headaches and shortness of breath. Patient notes that he has been having headaches for the past 4 months, describes them as constant, left sided pain with associated blurred vision in both eyes. No known triggers. He denies any associated weakness in his extremities associated with these headaches although notes he gets some numbness in his right arm and neck pain when headaches occur. He does not notice these are associated with cocaine use. He has not been taking his blood pressure medications at all for the past 3 weeks as he misplaced the medications. His last CT head 7/3/2019 was negative for acute pathology.     He has been having left sided chest pain for the past 5+ years. He describes it as a constant, \"pain that is always there.\" He denies that the pain improves with rest, he does not think the pain changes much with exertion. No recent falls or trauma. Of note he has had numerous exams to try an determine the cause of his chest pain, including a nromal NM lexiscan (2018), non diagnostic dobutamine stress echo (8/2019) as well as a negative CTA (8/3/2019).  Pain does not radiate to the back.    He has had chronic low back pain for many years, his last lumbar spine MRI was in 2015 which demonstrated moderate to severe foraminal stenosis although was overall stable.  He has been seen by orthopedic surgery in the past and has received several steroid injections, although plan is been to manage conservatively.  He denies any new weakness, numbness in his lower extremities.  He denies bowel or bladder incontinence.  Denies any new falls or trauma " to the lower back.  Pain is not changed significantly over the past several months.  He is not currently taking anything for the pain also has been on various regimens including Percocet and gabapentin in the past she felt were helpful.    This cough is been present for the last month, it is persistent and is associated with a productive cough with white/green sputum.  He also endorses subjective fevers although has not taken his temperature and chills.  He did report receiving a short course of antibiotics which temporarily improved his symptoms although they returned once stopping treatment.  He also is supposed to be on an albuterol inhaler which he ran out of.  He had a chest x-ray which was unremarkable 10 days prior to this appointment.  He continues to smoke 1 pack of cigarettes per week.  He has a history of latent TB which was fully treated.  He is currently living with his sister although has been intermittently homeless.  He is not working and brought him paperwork to apply for disability benefits until he can get Social Security to [facility].      Problem, Medication and Allergy Lists were reviewed and are current.  Patient is a new patient to this clinic and so  I reviewed/updated the Past Medical History, the Family History and the Social History.     Patient Active Problem List   Diagnosis     CARDIOVASCULAR SCREENING; LDL GOAL LESS THAN 130     OA (osteoarthritis of spine)     DDD (degenerative disc disease), lumbar     Chronic low back pain     Elevated blood pressure     Chest pain     Cocaine abuse (H)     Tobacco use disorder     Spondylosis     Degeneration of intervertebral disc     Depressive disorder     Neuralgia and neuritis     Other, mixed, or unspecified nondependent drug abuse, unspecified     Cecal volvulus (H)     Past Medical History:   Diagnosis Date     Back pain      Depression      DJD (degenerative joint disease)      Hypertension      Polysubstance abuse (H)      TB  "(tuberculosis) 2009    hx TB, states full regimine of medication taken in 2009 and xray in march 2018 looked good     TB lung, latent      Past Surgical History:   Procedure Laterality Date     BRONCHOSCOPY       COLONOSCOPY  12-5-12    Repeat Colonoscopy in 5 yrs.     HAND SURGERY      right palm     MANDIBLE SURGERY  10/2004     XR KNEE SURGERY JUANCHO RIGHT       Family History   Problem Relation Age of Onset     Hypertension Mother      Cerebrovascular Disease Brother      Social History     Tobacco Use     Smoking status: Current Some Day Smoker     Packs/day: 1.00     Years: 15.00     Pack years: 15.00     Types: Cigarettes     Smokeless tobacco: Never Used   Substance Use Topics     Alcohol use: Yes     Alcohol/week: 0.0 standard drinks     Comment: occasionally     Drug use: Yes     Types: Cocaine, \"Crack\" cocaine, Marijuana     Comment: PT USED COCAINE YESTERDAY TO DULL THE PAIN     Current Outpatient Medications   Medication Sig Dispense Refill     albuterol (PROAIR HFA/PROVENTIL HFA/VENTOLIN HFA) 108 (90 Base) MCG/ACT inhaler Inhale 2 puffs into the lungs every 4 hours as needed for shortness of breath / dyspnea or wheezing 1 Inhaler 11     amLODIPine (NORVASC) 5 MG tablet Take 1 tablet (5 mg) by mouth daily 90 tablet 3     doxycycline hyclate (VIBRAMYCIN) 100 MG capsule Take 1 capsule (100 mg) by mouth 2 times daily 10 capsule 0     lisinopril (PRINIVIL/ZESTRIL) 10 MG tablet Take 1 tablet (10 mg) by mouth daily 90 tablet 3     predniSONE (DELTASONE) 20 MG tablet Take 1 tablet (20 mg) by mouth daily 10 tablet 0     cefuroxime (CEFTIN) 500 MG tablet Take 1 tablet (500 mg) by mouth 2 times daily (Patient not taking: Reported on 10/29/2019) 20 tablet 0     nitroglycerin (NITROSTAT) 0.4 MG SL tablet Place 1 tablet (0.4 mg) under the tongue every 5 minutes as needed for chest pain If you are still having symptoms after 3 doses (15 minutes) call 911. (Patient not taking: Reported on 10/29/2019) 25 tablet 0 "     Allergies   Allergen Reactions     Hydrocodone-Acetaminophen Itching              Review of Systems:     ROS  14 point ROS is negative except for that noted in HPI           Physical Exam:   BP (!) 152/120   Pulse 110   Wt 80.7 kg (178 lb)   SpO2 96%   BMI 27.06 kg/m    Body mass index is 27.06 kg/m .  Vitals were reviewed       GENERAL APPEARANCE: Alert, appears stated age, not in distress      EYES: EOMI,  PERRL, no scleral icterus      HENT: ear canals and TM's normal, mouth without ulcers or lesions     NECK: no adenopathy, no asymmetry, masses, or scars     RESP: lungs clear to auscultation - no rales, rhonchi or wheezes     CV: regular rates and rhythm, normal S1 S2, no murmur, click or rub     ABDOMEN:  soft, nontender, no HSM or masses and bowel sounds normal     MS: extremities normal- no gross deformities noted. No pain to palpation of the lower back.      SKIN: no suspicious lesions or rashes     NEURO: CN II-XII intact, strength 5/5 in upper and lower extremities. Normal straight leg test w/o pain. 2+ patellar reflexes bilaterally. Normal monofilament testing of the feet.      PSYCH: mentation appears normal. and affect normal/bright     LYMPHATICS: No cervical adenopathy        Results:      Results from the last 24 hoursNo results found for this or any previous visit (from the past 24 hour(s)).  Assessment and Plan   Abhilash was seen today for establish care and pain.    Diagnoses and all orders for this visit:    Chronic daily headache  Etiology of his headaches is not clear to me, does endorse some vision changes which could be consistent with migraine with aura although he does not describe any obvious photosensitivity or other inciting cause that would expect with migraine.  Tension headache is a consideration, could be secondary to poorly controlled blood pressure or cocaine use.  He has had multiple CT scans of his head over the past several years which have revealed an obvious mass.  No  focal deficits on his neurological exam, I do not feel there is a need for further imaging at this time.  I would not initiate a triptan today given his poorly controlled blood pressure although we can consider in the future.  Advised against NSAID use given his apparent chronic kidney disease and other comorbidities..     Elevated serum creatinine  Persistently elevated creatinines for the past several months at least, likely some underlying chronic kidney disease which could be due to uncontrolled hypertension versus use of NSAIDs in the past and frequent imaging studies.  Recheck today and follow closely also check his urine for protein and assess for diabetes.  -     Basic metabolic panel; Future  -     Hemoglobin A1c; Future  -     Protein  random urine with Creat Ratio; Future    Hypokalemia  Recheck BMP today.    Benign essential hypertension  -     Basic metabolic panel; Future    DDD (degenerative disc disease), lumbar  Long history of chronic back pain and degenerative disc disease.  He had moderate to severe foraminal stenosis on his last MRI in 2015.  He has been evaluated by surgery in the past, feel that conservative management is the best course at this time.  I did fill out paperwork for him regarding his disability application noting that he does have degenerative lumbar spine disease but I think he would be eligible for certain jobs based on this criteria alone.  Will be important that he follows up closely with physical therapy to be the optimal way to manage his pain long-term.  He has been on narcotics in the past although not on any currently, we could consider trial of gabapentin which appears has helped in the past at his next appointment.  No red flag symptoms today.  -     PHYSICAL THERAPY REFERRAL; Future    Essential hypertension  Blood pressure elevated in clinic today although patient has been off medications for the past 3 weeks.  I refilled his prescription today and I would like to  see him again in 1 week for blood pressure check to discuss other medical issues.  -     lisinopril (PRINIVIL/ZESTRIL) 10 MG tablet; Take 1 tablet (10 mg) by mouth daily  -     amLODIPine (NORVASC) 5 MG tablet; Take 1 tablet (5 mg) by mouth daily    Other chest pain  He has had numerous hospitalizations over the past year and emergency department presentations primarily due to this chest pain.  Pain was not clearly reproducible on exam although could be musculoskeletal nature.  I do not think this is cardiac chest pain although could be related to intermittent cocaine use.  He had a normal nuclear scan in April 2018 as well as nondiagnostic dobutamine echo earlier this year.  He is also had a CT angiogram which essentially rules out PE as well as aortic dissection.  He is not currently on a statin which she would likely benefit from we will get a baseline lipid panel today and follow-up next week regarding possible treatment.  -     Lipid Profile FASTING; Future    Cocaine abuse (H)  History of ongoing cocaine abuse, last used 3 days prior to this appointment.  Placed referral to see Dr. Jaskaran Montaño from behavioral health for ongoing treatment of substance abuse.  Also plan to discuss options regarding tobacco use.  -     BEHAVIORAL / SPIRITUAL HEALTH ([facility] ONLY)    Healthcare maintenance  -     HCV Screen; Future  -     HIV Antigen Antibody Combo; Future    Acute bronchitis, unspecified organism  Patient reports 1 month of persistent productive cough with subjective fevers and chills.  Apparently received 1 outpatient course of antibiotics with some improvement of symptoms returned.  Clear chest x-ray 10 days ago.  Differential includes COPD exacerbation, acute bronchitis and/or atypical pneumonia.  Will treat with a 5-day course of antibiotics and prednisone.  Will use doxy as opposed to a azithromycin due to intermittent QTC prolongation.  We will need to consider if cough is related to lisinopril if it does  not resolve.  -     Basic metabolic panel; Future  -     predniSONE (DELTASONE) 20 MG tablet; Take 1 tablet (20 mg) by mouth daily  -     Discontinue: azithromycin (ZITHROMAX) 250 MG tablet; Take 2 tablets (500 mg) by mouth daily for 1 day, THEN 1 tablet (250 mg) daily for 4 days.  -     doxycycline hyclate (VIBRAMYCIN) 100 MG capsule; Take 1 capsule (100 mg) by mouth 2 times daily    Healthcare Maintenance  Did not administer flu shot today given acute illness.  Would also need to assess if he has received pneumococcal vaccination in the past.  Was unable to find records from a possible colonoscopy in 2014, he did have a colonoscopy in 2012 which was unremarkable but recommended 5-year follow-up as the indication was for occult bloody stools.  We will plan to refer to investigate this further at his next appointment in 1 week.  We will check a CBC today to assess for anemia.    Options for treatment and follow-up care were reviewed with the patient. Abhilash Gonzalez engaged in the decision making process and verbalized understanding of the options discussed and agreed with the final plan.    Andre Drew MD  Oct 29, 2019    Pt was seen and plan of care discussed with Dr Espitia.       Teaching Physician Note:  I was present during the visit and the patient was seen and examined by me.   I discussed the case with the resident and agree with the note as documented by the resident with the following exceptions:  None.    Licha Espitia M.D.  Internal Medicine   pager [phone]

## 2019-10-31 ENCOUNTER — HOSPITAL ENCOUNTER (EMERGENCY)
Facility: CLINIC | Age: 64
Discharge: HOME | End: 2019-10-31
Attending: EMERGENCY MEDICINE | Admitting: EMERGENCY MEDICINE
Payer: COMMERCIAL

## 2019-10-31 ENCOUNTER — APPOINTMENT (OUTPATIENT)
Dept: GENERAL RADIOLOGY | Facility: CLINIC | Age: 64
End: 2019-10-31
Attending: EMERGENCY MEDICINE
Payer: COMMERCIAL

## 2019-10-31 VITALS
HEIGHT: 68 IN | SYSTOLIC BLOOD PRESSURE: 154 MMHG | OXYGEN SATURATION: 100 % | HEART RATE: 105 BPM | DIASTOLIC BLOOD PRESSURE: 107 MMHG | WEIGHT: 181.1 LBS | BODY MASS INDEX: 27.45 KG/M2 | TEMPERATURE: 97.7 F | RESPIRATION RATE: 17 BRPM

## 2019-10-31 DIAGNOSIS — G44.219 EPISODIC TENSION-TYPE HEADACHE, NOT INTRACTABLE: ICD-10-CM

## 2019-10-31 DIAGNOSIS — I10 ESSENTIAL HYPERTENSION: ICD-10-CM

## 2019-10-31 PROCEDURE — 96361 HYDRATE IV INFUSION ADD-ON: CPT | Performed by: EMERGENCY MEDICINE

## 2019-10-31 PROCEDURE — 99284 EMERGENCY DEPT VISIT MOD MDM: CPT | Mod: 25 | Performed by: EMERGENCY MEDICINE

## 2019-10-31 PROCEDURE — 96375 TX/PRO/DX INJ NEW DRUG ADDON: CPT | Performed by: EMERGENCY MEDICINE

## 2019-10-31 PROCEDURE — 25000128 H RX IP 250 OP 636: Performed by: EMERGENCY MEDICINE

## 2019-10-31 PROCEDURE — 71046 X-RAY EXAM CHEST 2 VIEWS: CPT

## 2019-10-31 PROCEDURE — 96374 THER/PROPH/DIAG INJ IV PUSH: CPT | Performed by: EMERGENCY MEDICINE

## 2019-10-31 PROCEDURE — 99284 EMERGENCY DEPT VISIT MOD MDM: CPT | Mod: Z6 | Performed by: EMERGENCY MEDICINE

## 2019-10-31 PROCEDURE — 25000132 ZZH RX MED GY IP 250 OP 250 PS 637: Performed by: EMERGENCY MEDICINE

## 2019-10-31 RX ORDER — LISINOPRIL 10 MG/1
10 TABLET ORAL DAILY
Status: DISCONTINUED | OUTPATIENT
Start: 2019-10-31 | End: 2019-10-31 | Stop reason: HOSPADM

## 2019-10-31 RX ORDER — AMLODIPINE BESYLATE 5 MG/1
5 TABLET ORAL DAILY
Status: DISCONTINUED | OUTPATIENT
Start: 2019-10-31 | End: 2019-10-31 | Stop reason: HOSPADM

## 2019-10-31 RX ORDER — KETOROLAC TROMETHAMINE 15 MG/ML
15 INJECTION, SOLUTION INTRAMUSCULAR; INTRAVENOUS ONCE
Status: COMPLETED | OUTPATIENT
Start: 2019-10-31 | End: 2019-10-31

## 2019-10-31 RX ADMIN — PROCHLORPERAZINE EDISYLATE 10 MG: 5 INJECTION INTRAMUSCULAR; INTRAVENOUS at 06:00

## 2019-10-31 RX ADMIN — LISINOPRIL 10 MG: 10 TABLET ORAL at 07:55

## 2019-10-31 RX ADMIN — KETOROLAC TROMETHAMINE 15 MG: 15 INJECTION, SOLUTION INTRAMUSCULAR; INTRAVENOUS at 06:01

## 2019-10-31 RX ADMIN — AMLODIPINE BESYLATE 5 MG: 5 TABLET ORAL at 07:55

## 2019-10-31 RX ADMIN — SODIUM CHLORIDE 500 ML: 9 INJECTION, SOLUTION INTRAVENOUS at 06:00

## 2019-10-31 ASSESSMENT — MIFFLIN-ST. JEOR: SCORE: 1590.96

## 2019-10-31 ASSESSMENT — ENCOUNTER SYMPTOMS
COUGH: 1
HEADACHES: 1

## 2019-10-31 NOTE — ED NOTES
Pt BP improving, though remains high, pt given home dose BP meds, MD aware of /107, OK to discharge home. Pt instructed to continue home meds and follow up with PCP as scheduled on Tuesday. Pt reports HA greatly improved.

## 2019-10-31 NOTE — ED PROVIDER NOTES
"  History     Chief Complaint   Patient presents with     Headache     HPI  Abhilash Gonzalez is a 64 year old male who has a PMH of chronic low back pain, degenerative disc disease of lumbar spine, HTN, depression, cocaine abuse, tobacco abuse and history of homelessness who presents to the ED with c/o of headache.  Patient complains of a headache that has been intermittent for the past 5 months.  Patient states tonight he had a headache and was unable to sleep so he came to the emergency department.  Patient reports taking aspirin at home prior to arrival with no relief.  Patient describes the headache as a constant throbbing sensation in his frontal part of his forehead.  No aggravating, no relieving factors.  Patient denies any fever, chills, nausea, vomiting, neck stiffness, patient reports some intermittent blurry vision that is only associated when he gets these headaches.  Denies any abdominal pain, chest pain, shortness of breath.  Patient also complains of a cough that he has been having for a while, reports he had pneumonia recently and concerned that he may still have infection.    Per chart review patient has had numerous ED presentations and several hospitalizations for chest pain, back pain, headaches and shortness of breath, patient was seen in primary care clinic 1 day ago 10/29 to establish care.  Patient was seen regarding his chronic daily headaches, his elevated creatinine, uncontrolled hypertension, and has close follow-up with another appointment on Tuesday.    I have reviewed the Medications, Allergies, Past Medical and Surgical History, and Social History in the Epic system.    Review of Systems   Eyes: Positive for visual disturbance.   Respiratory: Positive for cough.    Neurological: Positive for headaches.   All other systems reviewed and are negative.      Physical Exam   BP: (!) 174/119  Pulse: 103  Temp: 97.7  F (36.5  C)  Resp: 17  Height: 172.7 cm (5' 8\")  Weight: 82.1 kg (181 lb 1.6 " oz)  SpO2: 97 %      Physical Exam  General: no acute distress. Appears stated age.   HENT: MMM, no oropharyngeal lesions  Eyes: PERRL, normal sclerae  Neck: non-tender, supple  Cardio:  regular rate. Regular rhythm. Extremities well perfused  Resp: Normal work of breathing, clear breath sounds  Chest/Back: no visual signs of trauma, no CVA tenderness  Abdomen: no tenderness, non-distended, no rebound, no guarding  Neuro: alert and fully oriented. CN II-XII grossly intact. Grossly normal strength and sensation in all extremities.   MSK: no deformities.   Integumentary/Skin: no rash visualized, normal color  Psych: normal affect, normal behavior    ED Course        Procedures    Labs Ordered and Resulted from Time of ED Arrival Up to the Time of Departure from the ED - No data to display  .      Assessments & Plan (with Medical Decision Making)   Abhilash Gonzalez is a 64 year old male who has a PMH of chronic low back pain, degenerative disc disease of lumbar spine, HTN, depression, cocaine abuse, tobacco abuse and history of homelessness who presents to the ED with c/o of headache.  Upon arrival patient is well-appearing, afebrile, no acute distress.  Patient is hypertensive upon arrival however this is chronic patient's uncontrolled hypertension. (Per chart review patient's blood pressure in clinic today was 150/120).  Patient has been off his blood pressure medication for the past 3 weeks and just was started on medication today.  Patient started on lisinopril 10 mg along with amlodipine 5 mg daily.  Patient has a follow-up on Tuesday for recheck of his blood pressure and continue close monitoring.  Patient with no focal neurological deficits, no red flags, and similar headaches in the past.  At this time patient given Compazine, Toradol with improvement of his headache and is resting and sleeping comfortably.  Chest x-ray was performed which is unremarkable no acute evidence of acute infection or pneumonia.  On  reevaluation patient is feeling better with improvement of symptoms.  At this time I think is reasonable for discharge home with continued close outpatient management of his chronic headaches, and uncontrolled hypertension.  I do recommend continuation of his prescribed medication and follow-up as an appointment on Tuesday.  Return precautions discussed if high fever, severe pain, persistent vomiting, chest pain, shortness of breath, any worsening symptoms.  Patient understands and agrees with plan. .The patient is discharged home with instructions to return if their symptoms persist or worsen.  Plan for close follow-up with their primary physician.  I discussed workup, results, treatment, and plan with the patient.  Patient understands and agrees with the plan.      I have reviewed the nursing notes.    I have reviewed the findings, diagnosis, plan and need for follow up with the patient.    New Prescriptions    No medications on file       Final diagnoses:   Episodic tension-type headache, not intractable   Essential hypertension       10/31/2019   [facility], [place], EMERGENCY DEPARTMENT     Junie Dennis MD  10/31/19 0712

## 2019-10-31 NOTE — DISCHARGE INSTRUCTIONS
Thank you for your patience today.  Please follow-up with your regular doctor at your appointment on Tuesday. Please continue your own medications.  Please return to the ER if you develop high fever, severe pain, persistent vomiting, chest pain, shortness of breath, weakness, or any worsening of your current symptoms.  It was a pleasure taking care of you today.  We hope you feel better soon.

## 2019-10-31 NOTE — ED TRIAGE NOTES
Onset of HA at 1130 pm reports reports similar to HA's he has been experiencing over the last 5 months. States he wasn't able to sleep despite taking the only analgesic he has at home (ASA).  Denies N/V/D.  Reports sensitivity to light with intermittent blurred vision.  Pt came [place] ED via bus and train.    Reports dx with pneumonia about 1 month ago, finished oral abx, but continues to have productive cough with chills.

## 2019-10-31 NOTE — ED AVS SNAPSHOT
[facility], [place], Emergency Department  [address] [place] [id number]  Phone:  [phone]                                    Abhilash Gonzalez   MRN: [id number]    Department:  [facility], Emergency Department   Date of Visit:  10/31/2019           After Visit Summary Signature Page    I have received my discharge instructions, and my questions have been answered. I have discussed any challenges I see with this plan with the nurse or doctor.    ..........................................................................................................................................  Patient/Patient Representative Signature      ..........................................................................................................................................  Patient Representative Print Name and Relationship to Patient    ..................................................               ................................................  Date                                   Time    ..........................................................................................................................................  Reviewed by Signature/Title    ...................................................              ..............................................  Date                                               Time          22EPIC Rev 08/18

## 2019-12-31 NOTE — H&P
[facility]  History and Physical  Hospitalist       Date of Admission:  4/9/2018    Assessment & Plan   Abhilash Gonzalez is a 62 year old male with hypertension, dyslipidemia, tobacco abuse, chronic neck and low back pain, depression, h/o cocaine abuse in remission who was admitted on 4/9/2018 for central chest pain.     Chest pain, atypical  Hypertension  Dyslipidemia  Onset of chest pain ~ 5:30 PM while lifting boxes at his workplace. Described as central chest pain, constant, non-radiating. 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 Not relieved with nitroglycerin, aspirin, toradol, GI cocktail given in ED. Upon arrival to observation unit, pain minimal. 12 lead EKG sinus tachycardia.  Chest x-ray normal.  CHADs-VASc score =1.  Initial troponin negative at 9:45 PM.  History of cocaine abuse in remission.  Urine drug screen positive only for cannabinoids. Recent viral URI symptoms in March which may be related to current presentation.   -Aspirin given in the emergency department  -serial troponins (2 more)  -check fasting lipid panel  -telemetry  -prn nitroglycerin  -continue home amlodipine  -As needed acetaminophen, Advil  -Oxycodone as needed for severe pain  -stress test in AM. Currently lexiscan is ordered but patient may be able to complete treadmill stress.   -add hemoglobin A1c to previous lab    Hypokalemia  Potassium 3.3.  Not on any medication known to cause this.  He has not been consuming alcohol in the last couple of days.  -Replete per protocol    Tobacco Abuse  Currently smokes 2-3 cigarettes per day.  13-pack-year smoking history.  -No nicotine replacement needed    # Pain Assessment:  Current Pain Score 4/10/2018   Patient currently in pain? denies   Pain score (0-10) -   Pain location -   Pain descriptors -   - Abhilash is experiencing pain due to chest dicomfort. Pain management was discussed and the plan was created in a collaborative fashion.  Abhilash's  "response to the current recommendations: engaged  - Please see the plan for pain management as documented above    DVT prophylaxis: Ambulate every shift  Code Status:  Full    Disposition: Expected discharge tomorrow after stress testing.     Neida Blair MD  Text Page    ~~~~~~~~~~~~~~~~~~~~~~~~~~~~~~~~~~~~~~~~~~~~~~~~~~~~~  Primary Care Physician   Physician No Ref-Primary    Chief Complaint   Chest pain    History is obtained from the patient, CareEverywhere and medical records.    History of Present Illness   Abhilash Gonzalez is a pleasant 62 year old male with hypertension, dyslipidemia, tobacco abuse history, chronic neck and low back pain, cocaine abuse history in remission who presents with onset of central chest pain occurring at around 5:30 PM on 4/9/18.  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  He describes it as a \"knot\" and was 8-9/10 in intensity.  No known history of myocardial infarction or coronary artery disease.  Has not had a recent stress echocardiogram.  Denies any shortness of breath, dyspnea on exertion, leg swelling, orthopnea.  No pleuritic chest pain nor positional changes.  He cannot identify any exacerbating or alleviating factors.  Family history positive for a mother with some cardiac problems and death at age 43.Many first-degree relatives with hypertension and a brother with diabetes.    Discussed with Dr. Swain from the Emergency Department.  Twelve-lead EKG with sinus tachycardia.  Chest x-ray normal.  No relief of chest pain after nitroglycerin, GI cocktail, Toradol, aspirin care everywhere records reviewed.  Was seen at [facility] on 3/25/18 for viral upper respiratory infectious symptoms and was treated symptomatically.      Past Medical History    I have reviewed this patient's medical history and updated it with pertinent information if needed.   Past Medical History: "   Diagnosis Date     Back pain      Depression      DJD (degenerative joint disease)      Polysubstance abuse      TB lung, latent      Past Surgical History   I have reviewed this patient's surgical history and updated it with pertinent information if needed.  Past Surgical History:   Procedure Laterality Date     BRONCHOSCOPY       COLONOSCOPY  12-5-12    Repeat Colonoscopy in 5 yrs.     HAND SURGERY      right palm     MANDIBLE SURGERY  10/2004     XR KNEE SURGERY JUANCHO RIGHT       Prior to Admission Medications  not taking gabapentin.  Prior to Admission Medications   Prescriptions Last Dose Informant Patient Reported? Taking?   amLODIPine (NORVASC) 5 MG tablet Past Week at Unknown time  No Yes   Sig: Take 1 tablet (5 mg) by mouth daily   aspirin EC 81 MG EC tablet 4/8/2018 at Unknown time  Yes Yes   Sig: Take 81 mg by mouth   gabapentin (NEURONTIN) 300 MG capsule   No No   Sig: Take 1 capsule (300 mg) by mouth 3 times daily   nitroglycerin (NITROSTAT) 0.4 MG SL tablet   No No   Sig: Place 1 tablet (0.4 mg) under the tongue every 5 minutes as needed for chest pain If you are still having symptoms after 3 doses (15 minutes) call 911.      Facility-Administered Medications: None     Allergies   Allergies   Allergen Reactions     Vicodin [Hydrocodone-Acetaminophen] Itching     Social History   I have reviewed this patient's social history and updated it with pertinent information if needed. Abhilash Gonzalez  reports that he has been smoking Cigarettes.  He has a 4.50 pack-year smoking history. He has never used smokeless tobacco. He reports that he drinks alcohol. He reports that he does not use illicit drugs.    Family History   I have reviewed this patient's family history and updated it with pertinent information if needed.   Family History   Problem Relation Age of Onset     Hypertension Mother      CEREBROVASCULAR DISEASE Brother      Review of Systems   The 10 point Review of Systems is negative other than  noted in the HPI or here.     Physical Exam   Temp: 98.3  F (36.8  C) Temp src: Oral BP: (!) 152/98 Pulse: 106 Heart Rate: 112 Resp: 22 SpO2: 92 % O2 Device: None (Room air)    Vital Signs with Ranges  Temp:  [98.3  F (36.8  C)] 98.3  F (36.8  C)  Pulse:  [106] 106  Heart Rate:  [] 112  Resp:  [13-22] 22  BP: (150-159)/() 152/98  SpO2:  [92 %-100 %] 92 %  192 lbs 3.2 oz    Constitutional: Alert, NAD, pleasant and interactive  Eyes: PERRL, sclerae anicteric  HEENT: mmm, atraumatic  Respiratory: Lungs CTAB, no wheezes or crackles  Cardiovascular: RRR, no murmurs  no LE edema  No pain on palpation of chest wall  GI: soft, non-tender, nondistended  Skin/Integument: warm, dry, no acute rashes  Lymph/Hematologic: no supra or infraclavicular lymphadenopathy, no petechiae   Genitourinary: not examined  Musc: BARBOUR, normal limb strength x 4  Neuro: AOx3, no focal deficits, no tremors  Psych: not anxious, not confused      Data   Data reviewed today:  I personally reviewed the EKG tracing showing sinus tachycardia.    Recent Labs  Lab 04/09/18  2145   WBC 6.0   HGB 15.7   MCV 87         POTASSIUM 3.3*   CHLORIDE 106   CO2 25   BUN 13   CR 1.17   ANIONGAP 11   MARK 8.2*   GLC 84   TROPI <0.015       Imaging:  Recent Results (from the past 24 hour(s))   XR Chest 2 Views    Narrative    XR CHEST 2 VW   4/9/2018 9:54 PM     HISTORY: Chest pain;     COMPARISON: Film dated 1/20/2016    FINDINGS: The heart is negative.  The lungs are clear. The pulmonary  vasculature is normal.  The bones and soft tissues are unremarkable.      Impression    IMPRESSION: No active infiltrate    no significant change.    HONEY TENA MD        Rheumatoid arthritis

## 2020-02-06 ENCOUNTER — APPOINTMENT (OUTPATIENT)
Dept: GENERAL RADIOLOGY | Facility: CLINIC | Age: 65
End: 2020-02-06
Attending: EMERGENCY MEDICINE
Payer: COMMERCIAL

## 2020-02-06 ENCOUNTER — HOSPITAL ENCOUNTER (EMERGENCY)
Facility: CLINIC | Age: 65
Discharge: HOME | End: 2020-02-06
Attending: EMERGENCY MEDICINE | Admitting: EMERGENCY MEDICINE
Payer: COMMERCIAL

## 2020-02-06 VITALS
OXYGEN SATURATION: 94 % | DIASTOLIC BLOOD PRESSURE: 123 MMHG | BODY MASS INDEX: 26.52 KG/M2 | WEIGHT: 175 LBS | HEART RATE: 94 BPM | SYSTOLIC BLOOD PRESSURE: 184 MMHG | RESPIRATION RATE: 18 BRPM | HEIGHT: 68 IN | TEMPERATURE: 98.1 F

## 2020-02-06 DIAGNOSIS — R55 SYNCOPE, UNSPECIFIED SYNCOPE TYPE: ICD-10-CM

## 2020-02-06 DIAGNOSIS — M25.552 HIP PAIN, LEFT: ICD-10-CM

## 2020-02-06 LAB
ANION GAP SERPL CALCULATED.3IONS-SCNC: 4 MMOL/L (ref 3–14)
BASOPHILS # BLD AUTO: 0 10E9/L (ref 0–0.2)
BASOPHILS NFR BLD AUTO: 0.4 %
BUN SERPL-MCNC: 20 MG/DL (ref 7–30)
CALCIUM SERPL-MCNC: 9.2 MG/DL (ref 8.5–10.1)
CHLORIDE SERPL-SCNC: 107 MMOL/L (ref 94–109)
CO2 SERPL-SCNC: 28 MMOL/L (ref 20–32)
CREAT SERPL-MCNC: 1.43 MG/DL (ref 0.66–1.25)
DEPRECATED S PYO AG THROAT QL EIA: NORMAL
DIFFERENTIAL METHOD BLD: NORMAL
EOSINOPHIL # BLD AUTO: 0.1 10E9/L (ref 0–0.7)
EOSINOPHIL NFR BLD AUTO: 1.2 %
ERYTHROCYTE [DISTWIDTH] IN BLOOD BY AUTOMATED COUNT: 12.4 % (ref 10–15)
GFR SERPL CREATININE-BSD FRML MDRD: 52 ML/MIN/{1.73_M2}
GLUCOSE SERPL-MCNC: 103 MG/DL (ref 70–99)
HCT VFR BLD AUTO: 51.5 % (ref 40–53)
HGB BLD-MCNC: 17.4 G/DL (ref 13.3–17.7)
IMM GRANULOCYTES # BLD: 0 10E9/L (ref 0–0.4)
IMM GRANULOCYTES NFR BLD: 0.2 %
INTERPRETATION ECG - MUSE: NORMAL
LYMPHOCYTES # BLD AUTO: 1.1 10E9/L (ref 0.8–5.3)
LYMPHOCYTES NFR BLD AUTO: 22.2 %
MCH RBC QN AUTO: 32.6 PG (ref 26.5–33)
MCHC RBC AUTO-ENTMCNC: 33.8 G/DL (ref 31.5–36.5)
MCV RBC AUTO: 96 FL (ref 78–100)
MONOCYTES # BLD AUTO: 0.5 10E9/L (ref 0–1.3)
MONOCYTES NFR BLD AUTO: 10.8 %
NEUTROPHILS # BLD AUTO: 3.1 10E9/L (ref 1.6–8.3)
NEUTROPHILS NFR BLD AUTO: 65.2 %
NRBC # BLD AUTO: 0 10*3/UL
NRBC BLD AUTO-RTO: 0 /100
PLATELET # BLD AUTO: 187 10E9/L (ref 150–450)
POTASSIUM SERPL-SCNC: 3.7 MMOL/L (ref 3.4–5.3)
RBC # BLD AUTO: 5.34 10E12/L (ref 4.4–5.9)
SODIUM SERPL-SCNC: 139 MMOL/L (ref 133–144)
SPECIMEN SOURCE: NORMAL
TROPONIN I SERPL-MCNC: 0.03 UG/L (ref 0–0.04)
WBC # BLD AUTO: 4.8 10E9/L (ref 4–11)

## 2020-02-06 PROCEDURE — 99285 EMERGENCY DEPT VISIT HI MDM: CPT | Mod: 25

## 2020-02-06 PROCEDURE — 99285 EMERGENCY DEPT VISIT HI MDM: CPT | Mod: 25 | Performed by: EMERGENCY MEDICINE

## 2020-02-06 PROCEDURE — 73502 X-RAY EXAM HIP UNI 2-3 VIEWS: CPT

## 2020-02-06 PROCEDURE — 25000132 ZZH RX MED GY IP 250 OP 250 PS 637: Performed by: EMERGENCY MEDICINE

## 2020-02-06 PROCEDURE — 96360 HYDRATION IV INFUSION INIT: CPT

## 2020-02-06 PROCEDURE — 93005 ELECTROCARDIOGRAM TRACING: CPT

## 2020-02-06 PROCEDURE — 87880 STREP A ASSAY W/OPTIC: CPT | Performed by: EMERGENCY MEDICINE

## 2020-02-06 PROCEDURE — 87081 CULTURE SCREEN ONLY: CPT | Performed by: EMERGENCY MEDICINE

## 2020-02-06 PROCEDURE — 84484 ASSAY OF TROPONIN QUANT: CPT | Performed by: EMERGENCY MEDICINE

## 2020-02-06 PROCEDURE — 25800030 ZZH RX IP 258 OP 636: Performed by: EMERGENCY MEDICINE

## 2020-02-06 PROCEDURE — 93010 ELECTROCARDIOGRAM REPORT: CPT | Mod: Z6 | Performed by: EMERGENCY MEDICINE

## 2020-02-06 PROCEDURE — 85025 COMPLETE CBC W/AUTO DIFF WBC: CPT | Performed by: EMERGENCY MEDICINE

## 2020-02-06 PROCEDURE — 80048 BASIC METABOLIC PNL TOTAL CA: CPT | Performed by: EMERGENCY MEDICINE

## 2020-02-06 PROCEDURE — 71046 X-RAY EXAM CHEST 2 VIEWS: CPT

## 2020-02-06 RX ORDER — IBUPROFEN 600 MG/1
600 TABLET, FILM COATED ORAL ONCE
Status: COMPLETED | OUTPATIENT
Start: 2020-02-06 | End: 2020-02-06

## 2020-02-06 RX ADMIN — IBUPROFEN 600 MG: 600 TABLET ORAL at 10:13

## 2020-02-06 RX ADMIN — SODIUM CHLORIDE 1000 ML: 9 INJECTION, SOLUTION INTRAVENOUS at 10:12

## 2020-02-06 RX ADMIN — SODIUM CHLORIDE 1000 ML: 9 INJECTION, SOLUTION INTRAVENOUS at 11:22

## 2020-02-06 ASSESSMENT — ENCOUNTER SYMPTOMS
VOMITING: 0
FEVER: 1
NAUSEA: 0
SINUS PAIN: 0
SORE THROAT: 1
LIGHT-HEADEDNESS: 1
COUGH: 1
ABDOMINAL PAIN: 0
PALPITATIONS: 1

## 2020-02-06 ASSESSMENT — MIFFLIN-ST. JEOR: SCORE: 1563.29

## 2020-02-06 NOTE — ED AVS SNAPSHOT
[facility], [place], Emergency Department  [address] [place] [id number]  Phone:  [phone]                                    Abhilash Gonzalez   MRN: [id number]    Department:  [facility], Emergency Department   Date of Visit:  2/6/2020           After Visit Summary Signature Page    I have received my discharge instructions, and my questions have been answered. I have discussed any challenges I see with this plan with the nurse or doctor.    ..........................................................................................................................................  Patient/Patient Representative Signature      ..........................................................................................................................................  Patient Representative Print Name and Relationship to Patient    ..................................................               ................................................  Date                                   Time    ..........................................................................................................................................  Reviewed by Signature/Title    ...................................................              ..............................................  Date                                               Time          22EPIC Rev 08/18

## 2020-02-06 NOTE — ED PROVIDER NOTES
"    [facility] ([facility])  2/06/20    History     Chief Complaint   Patient presents with     Leg Pain     upper left leg/hip region     Cough     congested; green-yellow sputum     Fever     subjective, \"having the sweats\"     Dizziness     The history is provided by the patient and medical records.     Abhilash Gonzalez is a 64 year old male with a past medical history significant for polysubstance abuse, homelessness, HTN and history of tuberculosis (2009) who presents here to the Emergency Department due to a syncopal episode, cough, sore throat and left hip pain amongst others. Patient reports that yesterday while standing at the bus stop he fainted and fell. States that he got lightheaded, had palpitations and felt \"fuzzy\". He fell forward slowly and recalls hitting his forehead on a bench. Lost consciousness after that. Went to [facility] for eval but left without being seen due to long wait. Reports that this has happened in the past, was evaluated in the ED, and was given \"something under the tongue\". Denies nausea or chest pain at the time. Currently denies HA or neck pain. Patient also notes that over the past few days he has had left hip pain. States that it is painful to walk and believes that it is a \"joint pain\". Notes that it is painful to lay on that side. Reports chronic right leg pain. Patient is also reporting a cough with yellow/green sputum for the past few weeks. Notes associated chest pain with coughing. Has had subjective fevers and sore throat. Denies abdominal pain, vomiting or sinus pain. Patient is a smoker.    I have reviewed the Medications, Allergies, Past Medical and Surgical History, and Social History in the Ratify system.    Past Medical History:   Diagnosis Date     Back pain      Depression      DJD (degenerative joint disease)      Hypertension      Polysubstance abuse (H)      TB (tuberculosis) 2009    hx TB, states full regimine of medication taken in 2009 " "and xray in march 2018 looked good     TB lung, latent        Past Surgical History:   Procedure Laterality Date     BRONCHOSCOPY       COLONOSCOPY  12-5-12    Repeat Colonoscopy in 5 yrs.     HAND SURGERY      right palm     MANDIBLE SURGERY  10/2004     XR KNEE SURGERY JUANCHO RIGHT         Family History   Problem Relation Age of Onset     Hypertension Mother      Cerebrovascular Disease Brother        Social History     Tobacco Use     Smoking status: Current Some Day Smoker     Packs/day: 1.00     Years: 15.00     Pack years: 15.00     Types: Cigarettes     Smokeless tobacco: Never Used   Substance Use Topics     Alcohol use: Yes     Alcohol/week: 0.0 standard drinks     Comment: occasionally       No current facility-administered medications for this encounter.      Current Outpatient Medications   Medication     albuterol (PROAIR HFA/PROVENTIL HFA/VENTOLIN HFA) 108 (90 Base) MCG/ACT inhaler     amLODIPine (NORVASC) 5 MG tablet     cefuroxime (CEFTIN) 500 MG tablet     doxycycline hyclate (VIBRAMYCIN) 100 MG capsule     lisinopril (PRINIVIL/ZESTRIL) 10 MG tablet     nitroglycerin (NITROSTAT) 0.4 MG SL tablet     predniSONE (DELTASONE) 20 MG tablet        Allergies   Allergen Reactions     Hydrocodone-Acetaminophen Itching       Review of Systems   Constitutional: Positive for fever (Subjective).   HENT: Positive for sore throat. Negative for sinus pain.    Respiratory: Positive for cough (Green/yellow sputum).    Cardiovascular: Positive for palpitations (Resolved). Negative for chest pain.   Gastrointestinal: Negative for abdominal pain, nausea and vomiting.   Musculoskeletal:        Positive for left hip pain   Neurological: Positive for syncope and light-headedness (Resolved).   All other systems reviewed and are negative.      Physical Exam   BP: (!) 181/105  Pulse: 84  Heart Rate: 84  Temp: 97.8  F (36.6  C)  Resp: 18  Height: 172.7 cm (5' 8\")  Weight: 79.4 kg (175 lb)  SpO2: 97 %      Physical Exam  Vitals " signs and nursing note reviewed.   Constitutional:       General: He is not in acute distress.     Appearance: He is well-developed and normal weight. He is not ill-appearing or diaphoretic.   HENT:      Head: Normocephalic and atraumatic. No raccoon eyes, contusion or laceration. Hair is normal.      Nose: Nose normal. No rhinorrhea.      Right Sinus: No maxillary sinus tenderness or frontal sinus tenderness.      Left Sinus: No maxillary sinus tenderness or frontal sinus tenderness.      Mouth/Throat:      Mouth: Mucous membranes are moist.      Pharynx: Posterior oropharyngeal erythema present. No oropharyngeal exudate.   Eyes:      General: No scleral icterus.     Extraocular Movements: Extraocular movements intact.      Conjunctiva/sclera: Conjunctivae normal.      Pupils: Pupils are equal, round, and reactive to light.   Neck:      Musculoskeletal: Normal range of motion and neck supple. No neck rigidity.   Cardiovascular:      Rate and Rhythm: Normal rate and regular rhythm.      Heart sounds: No murmur.   Pulmonary:      Effort: Pulmonary effort is normal. No respiratory distress.      Breath sounds: No stridor. Rhonchi present. No wheezing or rales.   Chest:      Chest wall: No tenderness.   Abdominal:      General: There is no distension.      Palpations: Abdomen is soft.      Tenderness: There is no abdominal tenderness. There is no guarding or rebound.   Musculoskeletal: Normal range of motion.         General: No deformity.   Skin:     General: Skin is warm and dry.      Coloration: Skin is not jaundiced.      Findings: No rash.   Neurological:      General: No focal deficit present.      Mental Status: He is alert and oriented to person, place, and time.   Psychiatric:         Mood and Affect: Mood normal.         Behavior: Behavior normal.         Thought Content: Thought content normal.         ED Course   9:19 AM  The patient was seen and examined by Janene Salvador MD in Room ED37.        Procedures              EKG Interpretation:      Interpreted by Janene Salvador MD  Time reviewed: 10:58  Symptoms at time of EKG: None   Rhythm: normal sinus   Rate: Normal  Axis: Normal  Ectopy: none  Conduction: prolonged QTc  ST Segments/ T Waves: Non-specific ST-T wave changes  Q Waves: none  Comparison to prior: QTc shorter than on previous. Otherwise unchanged.     Clinical Impression: no acute changes and non-specific EKG                Critical Care time:  none             Labs Ordered and Resulted from Time of ED Arrival Up to the Time of Departure from the ED   BASIC METABOLIC PANEL - Abnormal; Notable for the following components:       Result Value    Glucose 103 (*)     Creatinine 1.43 (*)     GFR Estimate 52 (*)     GFR Estimate If Black 60 (*)     All other components within normal limits   CBC WITH PLATELETS DIFFERENTIAL   TROPONIN I   PERIPHERAL IV CATHETER   RAPID STREP SCREEN   BETA STREP GROUP A CULTURE   INFLUENZA A AND B AND RSV PCR            Assessments & Plan (with Medical Decision Making)   Abhilash Gonzalez is a 64 year old male with a past medical history significant for polysubstance abuse, homelessness, HTN and history of tuberculosis (2009) who presents here to the Emergency Department due to a syncopal episode, cough, sore throat and left hip pain amongst others.     Ddx: URI, PNA, cardiac syncope, vasovagal/orthostatic syncope, left trochanteric bursitis, hip contusion, hip DJD     Given ibuprofen for pain. Xray hip wnl. Suspect bursitis. He was provided with a cane to help with his left hip pain and advised to continue treating with ibuprofen, as needed.    EKG w/o acute ischemic changes. Trop wnl.  Since patient's syncopal episode occurred yesterday, will complete rule out with 1 troponin.  He does not have chest pain currently.    Patient refused flu swab. Strep neg. CXR neg. I suspect patient's symptoms are a viral URI.  I advised patient to continue oral hydration and try lozenges to assist  with his sputum production in the back of his throat.      Patient hit forehead during fall yesterday. No HA or neck pain. C spine cleared by nexus criteria. Head CT cleared by [place] head CT criteria. No imaging indicated.     Reviewed previous cardiac work up pertaining to syncopal eval. Had a nondiagnostic dobutamine stress test last year august due to hypotension with dobutamine infusion. CTA coronary was wnl. Will discuss with cards staff to see if further cardiac work up indicated.      I discussed with Dr. Kuo and reviewed patient's previous cardiac work-up and presenting symptoms of syncope with a prodrome.  She did not think further cardiac evaluation or a Holter monitor was warranted.  Patient's symptoms are most consistent with a vasovagal or orthostatic episode.  I discussed this with the patient and advised him to follow-up with his primary care doctor or return to the emergency department if he develops recurrent fainting, chest pain, palpitations.  I also encouraged him to keep orally hydrating.  Verbalized understanding and felt comfortable with discharge.    I have reviewed the nursing notes.    I have reviewed the findings, diagnosis, plan and need for follow up with the patient.    New Prescriptions    No medications on file       Final diagnoses:   Syncope, unspecified syncope type   Hip pain, left     IPreet, am serving as a trained medical scribe to document services personally performed by Janene Salvador MD, based on the provider's statements to me.   Janene LOONEY MD, was physically present and have reviewed and verified the accuracy of this note documented by Preet Martini.    2/6/2020   [facility], [place], EMERGENCY DEPARTMENT     Janene Salvador MD  02/06/20 0197

## 2020-02-06 NOTE — DISCHARGE INSTRUCTIONS
Please make an appointment to follow up with Your Primary Care Provider in 3-5 days unless symptoms completely resolve.    Take tylenol or ibuprofen for hip pain. Rest and use a cane for support, as needed.    Return to the ED for recurrent fainting, chest pain, palpitations, or shortness of breath.

## 2020-02-06 NOTE — ED TRIAGE NOTES
65y/o M - presenting to ED for eval of multiple c/o:  Left upper leg / hip pain, congested cough with green-yellow sputum (for the last three days).  Also states he fell last week and the day before; subsequent pain to his right arm; also states feeling dizzy, yesterday, and 'passing-out' at the bus stop (also yesterday).  Patient did not get the flu vaccine this season.  Airway otherwise patent.

## 2020-02-08 LAB
BACTERIA SPEC CULT: NORMAL
SPECIMEN SOURCE: NORMAL

## 2020-02-17 ENCOUNTER — HOSPITAL ENCOUNTER (EMERGENCY)
Facility: CLINIC | Age: 65
Discharge: HOME | End: 2020-02-17
Attending: PHYSICIAN ASSISTANT | Admitting: PHYSICIAN ASSISTANT
Payer: COMMERCIAL

## 2020-02-17 ENCOUNTER — APPOINTMENT (OUTPATIENT)
Dept: GENERAL RADIOLOGY | Facility: CLINIC | Age: 65
End: 2020-02-17
Attending: PHYSICIAN ASSISTANT
Payer: COMMERCIAL

## 2020-02-17 VITALS
SYSTOLIC BLOOD PRESSURE: 168 MMHG | OXYGEN SATURATION: 98 % | RESPIRATION RATE: 20 BRPM | DIASTOLIC BLOOD PRESSURE: 85 MMHG | TEMPERATURE: 98.2 F | HEART RATE: 107 BPM

## 2020-02-17 DIAGNOSIS — M25.552 HIP PAIN, LEFT: ICD-10-CM

## 2020-02-17 DIAGNOSIS — R05.9 COUGH: ICD-10-CM

## 2020-02-17 LAB
ALBUMIN UR-MCNC: 30 MG/DL
APPEARANCE UR: CLEAR
BILIRUB UR QL STRIP: NEGATIVE
COLOR UR AUTO: YELLOW
GLUCOSE UR STRIP-MCNC: NEGATIVE MG/DL
HGB UR QL STRIP: ABNORMAL
KETONES UR STRIP-MCNC: NEGATIVE MG/DL
LEUKOCYTE ESTERASE UR QL STRIP: NEGATIVE
MUCOUS THREADS #/AREA URNS LPF: PRESENT /LPF
NITRATE UR QL: NEGATIVE
PH UR STRIP: 5.5 PH (ref 5–7)
RBC #/AREA URNS AUTO: 1 /HPF (ref 0–2)
SOURCE: ABNORMAL
SP GR UR STRIP: 1.03 (ref 1–1.03)
UROBILINOGEN UR STRIP-MCNC: 2 MG/DL (ref 0–2)
WBC #/AREA URNS AUTO: <1 /HPF (ref 0–5)

## 2020-02-17 PROCEDURE — 71046 X-RAY EXAM CHEST 2 VIEWS: CPT

## 2020-02-17 PROCEDURE — 81001 URINALYSIS AUTO W/SCOPE: CPT | Performed by: PHYSICIAN ASSISTANT

## 2020-02-17 PROCEDURE — 25000128 H RX IP 250 OP 636: Performed by: PHYSICIAN ASSISTANT

## 2020-02-17 PROCEDURE — 25000132 ZZH RX MED GY IP 250 OP 250 PS 637: Performed by: PHYSICIAN ASSISTANT

## 2020-02-17 PROCEDURE — 96372 THER/PROPH/DIAG INJ SC/IM: CPT

## 2020-02-17 PROCEDURE — 99284 EMERGENCY DEPT VISIT MOD MDM: CPT | Mod: 25

## 2020-02-17 RX ORDER — LIDOCAINE 4 G/G
1 PATCH TOPICAL ONCE
Status: DISCONTINUED | OUTPATIENT
Start: 2020-02-17 | End: 2020-02-17 | Stop reason: HOSPADM

## 2020-02-17 RX ORDER — KETOROLAC TROMETHAMINE 15 MG/ML
15 INJECTION, SOLUTION INTRAMUSCULAR; INTRAVENOUS ONCE
Status: COMPLETED | OUTPATIENT
Start: 2020-02-17 | End: 2020-02-17

## 2020-02-17 RX ORDER — BENZONATATE 200 MG/1
200 CAPSULE ORAL 3 TIMES DAILY PRN
Qty: 15 CAPSULE | Refills: 0 | Status: SHIPPED | OUTPATIENT
Start: 2020-02-17 | End: 2023-05-19

## 2020-02-17 RX ORDER — PREDNISONE 20 MG/1
40 TABLET ORAL DAILY
Qty: 10 TABLET | Refills: 0 | Status: SHIPPED | OUTPATIENT
Start: 2020-02-17 | End: 2020-02-22

## 2020-02-17 RX ADMIN — KETOROLAC TROMETHAMINE 15 MG: 15 INJECTION, SOLUTION INTRAMUSCULAR; INTRAVENOUS at 14:30

## 2020-02-17 RX ADMIN — LIDOCAINE 1 PATCH: 560 PATCH PERCUTANEOUS; TOPICAL; TRANSDERMAL at 14:30

## 2020-02-17 ASSESSMENT — ENCOUNTER SYMPTOMS
ARTHRALGIAS: 1
CHILLS: 1
COUGH: 1
DYSURIA: 0
HEMATURIA: 1

## 2020-02-17 NOTE — ED PROVIDER NOTES
"  History     Chief Complaint:  Cough and Hip Pain    HPI     History is limited as the patient is a poor historian.     Abhilash Gonzalez is a 64 year old male with a history of tuberculosis, chronic low back pain, and polysubstance abuse who presents with cough and hip pain. The patient reported that he has had a consistent cough for 2 months that has persisted despite the use of azithromycin. He endorses subjective chills, but denies a recorded fever.       Additionally, the patient states that he has had left hip pain with radiation down the side and back of his leg to his knee for the last 2 weeks. His constant \"hammer like\" pain is exacerbated by laying on his left hip. He denied any fall or injury prior to onset of his hip pain and denies urinary or bowel incontinence or saddle paresthsias.  The patient has been evaluated by his primary care doctor for both the cough, and hip pain.  The patient did have a hip and pelvis xray and a chest xray on 2/6/2020 the interpretation of which are below. He has not taken any medication for pain management today. The patient also reports one episode yesterday of hematuria without dysuria.     XR Pelvis & Hip left, 2 views: 2/6/2020   No acute osseous abnormality. Mild degenerative changes of the hips.  Lumbar spondylosis not well detailed on frontal views. Soft tissues  Unremarkable, as per radiology.     XR Chest 2 views:   No focal pulmonary opacity, as per radiology.       Allergies:  Hydrocodone-Acetaminophen      Medications:    Albuterol   Norvasc  Cefuroxine   Vibramycin   Lisinopril   Nitroglycerin   Deltasone   Paxil   Aspirin 81   Flomax   Zocor   Klor-con     Past Medical History:    Tuberculosis   hypertension   Polysubstance abuse   Depressive disorder   Chronic low back pain  degenerative disk disease   Neuralgia and neuritis    Past Surgical History:    Bronchoscopy   Colonoscopy   Hand surgery   Mandible surgery   Knee surgery hector right     Family History:  " "  hypertension   Cerebrovascular disease     Social History:  Current some day smoker   Occasional alcohol use   Cocaine, \"crack\" cocaine, and marijuana use   Marital Status:  Single     Review of Systems   Constitutional: Positive for chills.   Respiratory: Positive for cough.    Genitourinary: Positive for hematuria. Negative for dysuria.   Musculoskeletal: Positive for arthralgias.   All other systems reviewed and are negative.    Physical Exam     Patient Vitals for the past 24 hrs:   BP Temp Temp src Pulse Resp SpO2   02/17/20 1304 (!) 168/85 98.2  F (36.8  C) Oral 107 20 98 %     Physical Exam  General: Resting comfortably.  Alert.  Head:  The scalp, face, and head appear normal   Eyes:  Conjunctivae and sclerae are normal    ENT:    The oropharynx is normal     Uvula is in the midline       Structures are symmetric. No tonsillar hypertrophy or exudate.     Bilateral TMs are normal without evidence of infection   Neck:  No lymphadenopathy   CV:  Regular rate and rhythm     Normal S1/S2   Resp:  Lungs are clear to auscultation    Non-labored    No rales or wheezing   MS:  Tenderness to the left lateral hip.  There is also tenderness to the left paralumbar area. 5/5 strength with hip flexion/extension, knee flexion/extension, dorsi/plantar flexion, and big toe extension bilaterally.   Skin:  No rash or acute skin lesions noted   Neuro:  Speech is normal and fluent. Sensation intact throughout bilateral lower extremities. Patellar reflexes intact bilaterally.  No ankle clonus bilaterally.    Emergency Department Course   Imaging:  Radiographic findings were communicated with the patient who voiced understanding of the findings.    XR Chest PA & LAT:   No radiographic evidence of acute chest abnormality, as per radiology.     Laboratory:  UA with Microscopic: Blood: Trace, Protein Albumin: 30, Mucous: Present, o/w WNL    Interventions:  1430 Toradol 15 mg IM  1431 Lidocaine 1 patch transdermal     Emergency " Department Course:  Nursing notes and vitals reviewed. (3722) I performed an exam of the patient as documented above.     IV inserted. Medicine administered as documented above. Blood drawn. This was sent to the lab for further testing, results above.    The patient was sent for a chest XR while in the emergency department, findings above.     (2835) I rechecked the patient and discussed the results of his workup thus far.     Findings and plan explained to the Patient. Patient discharged home with instructions regarding supportive care, medications, and reasons to return. The importance of close follow-up was reviewed. The patient was prescribed Tessalon    I personally reviewed the laboratory results with the Patient and answered all related questions prior to discharge.     Impression & Plan    Medical Decision Making:  Abhilash Gonzalez is a 64 year old male who presents with cough and hip pain.  Please refer to the HPI for full details.  The patient states that he has had a cough that has been present for about 2 months, and hip pain is been bothering for the last 3 weeks. The patient has been evaluated several times for his cough and on several antibiotics, without relief.  On exam, he does have some mild wheezing.  No focal findings.  Chest x-ray was performed and was negative.  The patient has been evaluated by is primary doctor for assesshment of his left hip pain.  X-rays were performed at that time which demonstrated mild degenerative changes.  On exam, the patient has tenderness to the left lower back as well as radiation down the left leg.  He does also have spot tenderness to the left trochanteric area.  Given these findings, I think this could be a combination of trochanteric bursitis, and possible sciatica from his known disc herniations in the low back.  No indications for emergent CT/MRI imaging at this time.  He has no fever and is otherwise well-appearing.  This was atraumatic in nature, therefore  do not believe x-rays would be necessary at this time either.  No signs of septic joint. Overall, I believe the patient is safe to discharge home.  I believe the patient is likely being symptomatic secondary to bronchitis.  Will start steroids and he was given a refill of Tessalon.  He is asked to follow-up with his primary doctor in the next 3 to 5 days if symptoms not improved.  He was also given information for the orthopedic doctor to be evaluated for his left hip/back pain.  He will make this appointment as well in the next 2 to 3 days.  He will return immediately for any loss of bowel or bladder function, saddle paresthesias, weakness, tingling, trouble breathing, shortness of breath, chest pain, or any other concerns.  All questions were answered prior to discharge.  The patient understands and agrees to this plan.    Diagnosis:    ICD-10-CM    1. Cough R05    2. Hip pain, left M25.552        Disposition:  discharged to home    Discharge Medications:   Details   benzonatate (TESSALON) 200 MG capsule Take 1 capsule (200 mg) by mouth 3 times daily as needed for cough, Disp-15 capsule, R-0, Local Print        Scribe Disclosure:  IGabriella, am serving as a scribe on 2/17/2020 at 1:40 PM to personally document services performed by Meredith Sam PA* based on my observations and the provider's statements to me.       Scribe Disclosure:  I, Parish Wall, am serving as a scribe at 7:02 PM on 2/17/2020 to document services personally performed by Meredith Sam PA based on my observations and the provider's statements to me.       Gabriella Cheung  2/17/2020    EMERGENCY DEPARTMENT       Meredith Sam PA-C  02/17/20 [id number]       Meredith Sam PA-C  02/17/20 [id number]

## 2020-02-17 NOTE — ED AVS SNAPSHOT
Emergency Department  [address] [facility] [address]  Phone:  [phone]  Fax:  [phone]                                    Abhilash Gonzalez   MRN: [id number]    Department:   Emergency Department   Date of Visit:  2/17/2020           After Visit Summary Signature Page    I have received my discharge instructions, and my questions have been answered. I have discussed any challenges I see with this plan with the nurse or doctor.    ..........................................................................................................................................  Patient/Patient Representative Signature      ..........................................................................................................................................  Patient Representative Print Name and Relationship to Patient    ..................................................               ................................................  Date                                   Time    ..........................................................................................................................................  Reviewed by Signature/Title    ...................................................              ..............................................  Date                                               Time          22EPIC Rev 08/18

## 2020-02-17 NOTE — DISCHARGE INSTRUCTIONS
Discharge Instructions  Back Pain  You were seen today for back pain. Back pain can have many causes, but most will get better without surgery or other specific treatment. Sometimes there is a herniated ( slipped ) disc. We do not usually do MRI scans to look for these right away, since most herniated discs will get better on their own with time.  Today, we did not find any evidence that your back pain was caused by a serious condition. However, sometimes symptoms develop over time and cannot be found during an emergency visit, so it is very important that you follow up with your primary provider.  Generally, every Emergency Department visit should have a follow-up clinic visit with either a primary or a specialty clinic/provider. Please follow-up as instructed by your emergency provider today.    Return to the Emergency Department if:  You develop a fever with your back pain.   You have weakness or change in sensation in one or both legs.  You lose control of your bowels or bladder, or cannot empty your bladder (cannot pee).  Your pain gets much worse.     Follow-up with your provider:  Unless your pain has completely gone away, please make an appointment with your provider within one week. Most of the routine care for back pain is available in a clinic and not the Emergency Department. You may need further management of your back pain, such as more pain medication, imaging such as an X-ray or MRI, or physical therapy.    What can I do to help myself?  Remain Active -- People are often afraid that they will hurt their back further or delay recovery by remaining active, but this is one of the best things you can do for your back. In fact, staying in bed for a long time to rest is not recommended. Studies have shown that people with low back pain recover faster when they remain active. Movement helps to bring blood flow to the muscles and relieve muscle spasms as well as preventing loss of muscle strength.  Heat --  Using a heating pad can help with low back pain during the first few weeks. Do not sleep with a heating pad, as you can be burned.   Pain medications - You may take a pain medication such as Tylenol  (acetaminophen), Advil , Motrin  (ibuprofen) or Aleve  (naproxen).  If you were given a prescription for medicine here today, be sure to read all of the information (including the package insert) that comes with your prescription.  This will include important information about the medicine, its side effects, and any warnings that you need to know about.  The pharmacist who fills the prescription can provide more information and answer questions you may have about the medicine.  If you have questions or concerns that the pharmacist cannot address, please call or return to the Emergency Department.   Remember that you can always come back to the Emergency Department if you are not able to see your regular provider in the amount of time listed above, if you get any new symptoms, or if there is anything that worries you.    Discharge Instructions  Bronchitis, Pneumonia, Bronchospasm    You were seen today for a chest infection or inflammation. If your provider decided this was due to a bacterial infection, you may need an antibiotic. Sometimes these are caused by a virus, and then an antibiotic will not help.     Generally, every Emergency Department visit should have a follow-up clinic visit with either a primary or a specialty clinic/provider. Please follow-up as instructed by your emergency provider today.    Return to the Emergency Department if:  Your breathing gets much worse.  You are very weak, or feel much more ill.  You develop new symptoms, such as chest pain.  You cough up blood.  You are vomiting (throwing up) enough that you cannot keep fluids or your medicine down.    What can I do to help myself?  Fill any prescriptions the provider gave you and take them right away--especially antibiotics. Be sure to finish  the whole antibiotic prescription.  You may be given a prescription for an inhaler, which can help loosen tight air passages.  Use this as needed, but not more often than directed. Inhalers work much better when used with a spacer.   You may be given a prescription for a steroid to reduce inflammation. Used long-term, these can have side effects, but for short-term use they are safe. You may notice restlessness or increased appetite.      You may use non-prescription cough or cold medicines. Cough medicines may help, but don t make the cough go away completely.   Avoid smoke, because this can make your symptoms worse. If you smoke, this may be a good time to quit! Consider using nicotine lozenges, gum, or patches to reduce cravings.   If you have a fever, Tylenol  (acetaminophen), Motrin  (ibuprofen), or Advil  (ibuprofen) may help bring fever down and may help you feel more comfortable. Be sure to read and follow the package directions, and ask your provider if you have questions.  Be sure to get your flu shot each year.  For certain ages, the pneumonia shot can help prevent pneumonia.  If you were given a prescription for medicine here today, be sure to read all of the information (including the package insert) that comes with your prescription.  This will include important information about the medicine, its side effects, and any warnings that you need to know about.  The pharmacist who fills the prescription can provide more information and answer questions you may have about the medicine.  If you have questions or concerns that the pharmacist cannot address, please call or return to the Emergency Department.     Remember that you can always come back to the Emergency Department if you are not able to see your regular provider in the amount of time listed above, if you get any new symptoms, or if there is anything that worries you.

## 2020-02-17 NOTE — ED TRIAGE NOTES
Pt reports 2 months of cough and 2 weeks of L sided hip pain. Has not seen PCP for either. Has not taken any medications today.

## 2020-02-21 ENCOUNTER — HOSPITAL ENCOUNTER (EMERGENCY)
Facility: CLINIC | Age: 65
Discharge: HOME | End: 2020-02-21
Attending: INTERNAL MEDICINE | Admitting: INTERNAL MEDICINE
Payer: COMMERCIAL

## 2020-02-21 ENCOUNTER — APPOINTMENT (OUTPATIENT)
Dept: CT IMAGING | Facility: CLINIC | Age: 65
End: 2020-02-21
Attending: INTERNAL MEDICINE
Payer: COMMERCIAL

## 2020-02-21 ENCOUNTER — APPOINTMENT (OUTPATIENT)
Dept: GENERAL RADIOLOGY | Facility: CLINIC | Age: 65
End: 2020-02-21
Attending: INTERNAL MEDICINE
Payer: COMMERCIAL

## 2020-02-21 VITALS
WEIGHT: 164.46 LBS | RESPIRATION RATE: 16 BRPM | OXYGEN SATURATION: 99 % | HEIGHT: 68 IN | HEART RATE: 98 BPM | DIASTOLIC BLOOD PRESSURE: 102 MMHG | BODY MASS INDEX: 24.93 KG/M2 | TEMPERATURE: 98.3 F | SYSTOLIC BLOOD PRESSURE: 167 MMHG

## 2020-02-21 DIAGNOSIS — R05.9 COUGH: ICD-10-CM

## 2020-02-21 LAB
ALBUMIN UR-MCNC: 10 MG/DL
ANION GAP SERPL CALCULATED.3IONS-SCNC: 7 MMOL/L (ref 3–14)
APPEARANCE UR: CLEAR
BASOPHILS # BLD AUTO: 0 10E9/L (ref 0–0.2)
BASOPHILS NFR BLD AUTO: 0.2 %
BILIRUB UR QL STRIP: NEGATIVE
BUN SERPL-MCNC: 24 MG/DL (ref 7–30)
C TRACH DNA SPEC QL NAA+PROBE: NORMAL
CALCIUM SERPL-MCNC: 8.9 MG/DL (ref 8.5–10.1)
CHLORIDE SERPL-SCNC: 108 MMOL/L (ref 94–109)
CO2 SERPL-SCNC: 27 MMOL/L (ref 20–32)
COLOR UR AUTO: YELLOW
CREAT SERPL-MCNC: 1.32 MG/DL (ref 0.66–1.25)
DIFFERENTIAL METHOD BLD: NORMAL
EOSINOPHIL # BLD AUTO: 0 10E9/L (ref 0–0.7)
EOSINOPHIL NFR BLD AUTO: 0.7 %
ERYTHROCYTE [DISTWIDTH] IN BLOOD BY AUTOMATED COUNT: 12.2 % (ref 10–15)
GFR SERPL CREATININE-BSD FRML MDRD: 57 ML/MIN/{1.73_M2}
GLUCOSE SERPL-MCNC: 102 MG/DL (ref 70–99)
GLUCOSE UR STRIP-MCNC: NEGATIVE MG/DL
HCT VFR BLD AUTO: 47.6 % (ref 40–53)
HGB BLD-MCNC: 15.9 G/DL (ref 13.3–17.7)
HGB UR QL STRIP: NEGATIVE
IMM GRANULOCYTES # BLD: 0 10E9/L (ref 0–0.4)
IMM GRANULOCYTES NFR BLD: 0.2 %
KETONES UR STRIP-MCNC: NEGATIVE MG/DL
LEUKOCYTE ESTERASE UR QL STRIP: NEGATIVE
LYMPHOCYTES # BLD AUTO: 1.2 10E9/L (ref 0.8–5.3)
LYMPHOCYTES NFR BLD AUTO: 21.8 %
MCH RBC QN AUTO: 31.9 PG (ref 26.5–33)
MCHC RBC AUTO-ENTMCNC: 33.4 G/DL (ref 31.5–36.5)
MCV RBC AUTO: 95 FL (ref 78–100)
MONOCYTES # BLD AUTO: 0.4 10E9/L (ref 0–1.3)
MONOCYTES NFR BLD AUTO: 7.6 %
MUCOUS THREADS #/AREA URNS LPF: PRESENT /LPF
N GONORRHOEA DNA SPEC QL NAA+PROBE: NORMAL
NEUTROPHILS # BLD AUTO: 3.9 10E9/L (ref 1.6–8.3)
NEUTROPHILS NFR BLD AUTO: 69.5 %
NITRATE UR QL: NEGATIVE
NRBC # BLD AUTO: 0 10*3/UL
NRBC BLD AUTO-RTO: 0 /100
PH UR STRIP: 5.5 PH (ref 5–7)
PLATELET # BLD AUTO: 185 10E9/L (ref 150–450)
POTASSIUM SERPL-SCNC: 3.2 MMOL/L (ref 3.4–5.3)
RBC # BLD AUTO: 4.99 10E12/L (ref 4.4–5.9)
RBC #/AREA URNS AUTO: <1 /HPF (ref 0–2)
SODIUM SERPL-SCNC: 142 MMOL/L (ref 133–144)
SOURCE: ABNORMAL
SP GR UR STRIP: 1.02 (ref 1–1.03)
SPECIMEN SOURCE: NORMAL
SPECIMEN SOURCE: NORMAL
SQUAMOUS #/AREA URNS AUTO: <1 /HPF (ref 0–1)
UROBILINOGEN UR STRIP-MCNC: 2 MG/DL (ref 0–2)
WBC # BLD AUTO: 5.6 10E9/L (ref 4–11)
WBC #/AREA URNS AUTO: <1 /HPF (ref 0–5)

## 2020-02-21 PROCEDURE — 87591 N.GONORRHOEAE DNA AMP PROB: CPT | Performed by: INTERNAL MEDICINE

## 2020-02-21 PROCEDURE — 99285 EMERGENCY DEPT VISIT HI MDM: CPT | Mod: Z6 | Performed by: INTERNAL MEDICINE

## 2020-02-21 PROCEDURE — 25000125 ZZHC RX 250: Performed by: INTERNAL MEDICINE

## 2020-02-21 PROCEDURE — 85025 COMPLETE CBC W/AUTO DIFF WBC: CPT | Performed by: INTERNAL MEDICINE

## 2020-02-21 PROCEDURE — 81001 URINALYSIS AUTO W/SCOPE: CPT | Performed by: INTERNAL MEDICINE

## 2020-02-21 PROCEDURE — 71046 X-RAY EXAM CHEST 2 VIEWS: CPT

## 2020-02-21 PROCEDURE — 25000132 ZZH RX MED GY IP 250 OP 250 PS 637: Performed by: INTERNAL MEDICINE

## 2020-02-21 PROCEDURE — 99285 EMERGENCY DEPT VISIT HI MDM: CPT | Mod: 25 | Performed by: INTERNAL MEDICINE

## 2020-02-21 PROCEDURE — 25000128 H RX IP 250 OP 636: Performed by: INTERNAL MEDICINE

## 2020-02-21 PROCEDURE — 71250 CT THORAX DX C-: CPT

## 2020-02-21 PROCEDURE — 96372 THER/PROPH/DIAG INJ SC/IM: CPT | Performed by: INTERNAL MEDICINE

## 2020-02-21 PROCEDURE — 80048 BASIC METABOLIC PNL TOTAL CA: CPT | Performed by: INTERNAL MEDICINE

## 2020-02-21 PROCEDURE — 87491 CHLMYD TRACH DNA AMP PROBE: CPT | Performed by: INTERNAL MEDICINE

## 2020-02-21 RX ORDER — AZITHROMYCIN 250 MG/1
1000 TABLET, FILM COATED ORAL ONCE
Status: COMPLETED | OUTPATIENT
Start: 2020-02-21 | End: 2020-02-21

## 2020-02-21 RX ADMIN — AZITHROMYCIN MONOHYDRATE 1000 MG: 250 TABLET ORAL at 13:21

## 2020-02-21 RX ADMIN — LIDOCAINE HYDROCHLORIDE 250 MG: 10 INJECTION, SOLUTION EPIDURAL; INFILTRATION; INTRACAUDAL; PERINEURAL at 13:23

## 2020-02-21 ASSESSMENT — MIFFLIN-ST. JEOR: SCORE: 1515.5

## 2020-02-21 ASSESSMENT — ENCOUNTER SYMPTOMS
COUGH: 1
CHILLS: 1

## 2020-02-21 NOTE — ED AVS SNAPSHOT
[facility], [place], Emergency Department  [address] [place] [id number]  Phone:  [phone]                                    Abhilash Gonzalez   MRN: [id number]    Department:  [facility], Emergency Department   Date of Visit:  2/21/2020           After Visit Summary Signature Page    I have received my discharge instructions, and my questions have been answered. I have discussed any challenges I see with this plan with the nurse or doctor.    ..........................................................................................................................................  Patient/Patient Representative Signature      ..........................................................................................................................................  Patient Representative Print Name and Relationship to Patient    ..................................................               ................................................  Date                                   Time    ..........................................................................................................................................  Reviewed by Signature/Title    ...................................................              ..............................................  Date                                               Time          22EPIC Rev 08/18

## 2020-02-21 NOTE — DISCHARGE INSTRUCTIONS
Please follow-up with your primary doctor in the next week.  He will refer you to specialists, such as urology and pulmonology, as needed.  You have received antibiotics today to cover gonorrhea and chlamydia if you have these infections.  The results for these tests will not be back for several days.  If you develop fever, vomiting, fainting, or any other concerning symptoms please return to the ER.

## 2020-02-21 NOTE — ED PROVIDER NOTES
[place] EMERGENCY DEPARTMENT ([facility])  2/21/20    History     Chief Complaint   Patient presents with     Cough     HPI  Abhilash Gonzalez is a 64 year old male with a history notable for treated tuberculosis in 2009, HTN and polysubstance abuse who presents for evaluation of cough. The patient reported that he has had a consistent cough for 2 months that has persisted despite the use of azithromycin. He endorses subjective chills, but denies a recorded fever.  This is his fifth ER visit for cough in the last month and a half.  Plain films of the chest have revealed no opacities during prior visits.  He is here today also for 2 episodes of bleeding from the urethral meatus after intercourse last night.  Painless.  Of note during his visit 3 days ago to the ER for cough, he also noted hematuria without dysuria.  No hemoptysis.      PAST MEDICAL HISTORY  Past Medical History:   Diagnosis Date     Back pain      Depression      Depressive disorder      DJD (degenerative joint disease)      Hypertension      Polysubstance abuse (H)      TB (tuberculosis) 2009    hx TB, states full regimine of medication taken in 2009 and xray in march 2018 looked good     TB lung, latent      PAST SURGICAL HISTORY  Past Surgical History:   Procedure Laterality Date     BRONCHOSCOPY       COLONOSCOPY  12-5-12    Repeat Colonoscopy in 5 yrs.     HAND SURGERY      right palm     MANDIBLE SURGERY  10/2004     XR KNEE SURGERY JUANCHO RIGHT       FAMILY HISTORY  Family History   Problem Relation Age of Onset     Hypertension Mother      Cerebrovascular Disease Brother      SOCIAL HISTORY  Social History     Tobacco Use     Smoking status: Current Some Day Smoker     Packs/day: 1.00     Years: 15.00     Pack years: 15.00     Types: Cigarettes     Smokeless tobacco: Never Used   Substance Use Topics     Alcohol use: Yes     Alcohol/week: 0.0 standard drinks     Comment: occasionally     MEDICATIONS  Current Facility-Administered  "Medications   Medication     azithromycin (ZITHROMAX) tablet 1,000 mg     cefTRIAXone (ROCEPHIN) 250 mg in lidocaine (PF) (XYLOCAINE) 1 % injection     Current Outpatient Medications   Medication     albuterol (PROAIR HFA/PROVENTIL HFA/VENTOLIN HFA) 108 (90 Base) MCG/ACT inhaler     amLODIPine (NORVASC) 5 MG tablet     benzonatate (TESSALON) 200 MG capsule     cefuroxime (CEFTIN) 500 MG tablet     doxycycline hyclate (VIBRAMYCIN) 100 MG capsule     lisinopril (PRINIVIL/ZESTRIL) 10 MG tablet     nitroglycerin (NITROSTAT) 0.4 MG SL tablet     predniSONE (DELTASONE) 20 MG tablet     ALLERGIES  Allergies   Allergen Reactions     Hydrocodone-Acetaminophen Itching       I have reviewed the Medications, Allergies, Past Medical and Surgical History, and Social History in the Epic system.    Review of Systems   Constitutional: Positive for chills.   Respiratory: Positive for cough.    All other systems reviewed and are negative.      Physical Exam   BP: (!) 145/82  Pulse: 100  Temp: 98.3  F (36.8  C)  Resp: 18  Height: 172.7 cm (5' 8\")  Weight: 74.6 kg (164 lb 7.4 oz)(scale)  SpO2: 96 %      Physical Exam  Vitals signs and nursing note reviewed.   Constitutional:       General: He is not in acute distress.     Appearance: Normal appearance. He is normal weight. He is not ill-appearing, toxic-appearing or diaphoretic.   HENT:      Mouth/Throat:      Mouth: Mucous membranes are moist.   Eyes:      General: No scleral icterus.  Neck:      Musculoskeletal: No muscular tenderness.   Cardiovascular:      Rate and Rhythm: Normal rate and regular rhythm.   Pulmonary:      Effort: Pulmonary effort is normal. No respiratory distress.      Breath sounds: No stridor. No wheezing, rhonchi or rales.   Chest:      Chest wall: No tenderness.   Abdominal:      General: Abdomen is flat.      Tenderness: There is no abdominal tenderness. There is no guarding.   Genitourinary:     Penis: Normal.    Skin:     General: Skin is warm. "   Neurological:      General: No focal deficit present.      Mental Status: He is alert.   Psychiatric:         Mood and Affect: Mood normal.         ED Course   Point-of-care ultrasound done by me and interpreted by me shows an empty bladder.  As such, I am unable to comment on any obvious echogenic masses within.    Results for orders placed or performed during the hospital encounter of 02/21/20 (from the past 24 hour(s))   Chlamydia trachomatis PCR   Result Value Ref Range    Specimen Description Canceled, Test credited     Chlamydia Trachomatis PCR Canceled, Test credited NEG^Negative   Neisseria gonorrhoea PCR   Result Value Ref Range    Specimen Descrip Canceled, Test credited     N Gonorrhea PCR Canceled, Test credited NEG^Negative   XR Chest 2 Views    Narrative    EXAM: XR CHEST 2 VW  2/21/2020 10:00 AM     HISTORY:  cough       COMPARISON:  Chest x-ray 2/17/2020    FINDINGS:   PA and lateral views of the chest. Trachea is midline. Cardiac  silhouette is within normal limits. Mild perihilar streaky opacities,  slightly increased from prior. No pleural effusion or pneumothorax. No  acute osseous abnormality.       Impression    IMPRESSION:   No acute airspace disease.    I have personally reviewed the examination and initial interpretation  and I agree with the findings.    TERRY LEE MD   Basic metabolic panel   Result Value Ref Range    Sodium 142 133 - 144 mmol/L    Potassium 3.2 (L) 3.4 - 5.3 mmol/L    Chloride 108 94 - 109 mmol/L    Carbon Dioxide 27 20 - 32 mmol/L    Anion Gap 7 3 - 14 mmol/L    Glucose 102 (H) 70 - 99 mg/dL    Urea Nitrogen 24 7 - 30 mg/dL    Creatinine 1.32 (H) 0.66 - 1.25 mg/dL    GFR Estimate 57 (L) >60 mL/min/[1.73_m2]    GFR Estimate If Black 66 >60 mL/min/[1.73_m2]    Calcium 8.9 8.5 - 10.1 mg/dL   CBC with platelets differential   Result Value Ref Range    WBC 5.6 4.0 - 11.0 10e9/L    RBC Count 4.99 4.4 - 5.9 10e12/L    Hemoglobin 15.9 13.3 - 17.7 g/dL    Hematocrit 47.6  40.0 - 53.0 %    MCV 95 78 - 100 fl    MCH 31.9 26.5 - 33.0 pg    MCHC 33.4 31.5 - 36.5 g/dL    RDW 12.2 10.0 - 15.0 %    Platelet Count 185 150 - 450 10e9/L    Diff Method Automated Method     % Neutrophils 69.5 %    % Lymphocytes 21.8 %    % Monocytes 7.6 %    % Eosinophils 0.7 %    % Basophils 0.2 %    % Immature Granulocytes 0.2 %    Nucleated RBCs 0 0 /100    Absolute Neutrophil 3.9 1.6 - 8.3 10e9/L    Absolute Lymphocytes 1.2 0.8 - 5.3 10e9/L    Absolute Monocytes 0.4 0.0 - 1.3 10e9/L    Absolute Eosinophils 0.0 0.0 - 0.7 10e9/L    Absolute Basophils 0.0 0.0 - 0.2 10e9/L    Abs Immature Granulocytes 0.0 0 - 0.4 10e9/L    Absolute Nucleated RBC 0.0    UA with Microscopic   Result Value Ref Range    Color Urine Yellow     Appearance Urine Clear     Glucose Urine Negative NEG^Negative mg/dL    Bilirubin Urine Negative NEG^Negative    Ketones Urine Negative NEG^Negative mg/dL    Specific Gravity Urine 1.022 1.003 - 1.035    Blood Urine Negative NEG^Negative    pH Urine 5.5 5.0 - 7.0 pH    Protein Albumin Urine 10 (A) NEG^Negative mg/dL    Urobilinogen mg/dL 2.0 0.0 - 2.0 mg/dL    Nitrite Urine Negative NEG^Negative    Leukocyte Esterase Urine Negative NEG^Negative    Source Urine     WBC Urine <1 0 - 5 /HPF    RBC Urine <1 0 - 2 /HPF    Squamous Epithelial /HPF Urine <1 0 - 1 /HPF    Mucous Urine Present (A) NEG^Negative /LPF   Chest CT w/o contrast    Narrative    EXAMINATION: Chest CT  2/21/2020 11:15 AM    CLINICAL HISTORY: Acute resp illness, > 40 years old; Repeated  presentation to ED for productive cough.  History of pulmonary TB in  the past.    COMPARISON: 8/3/2019, 7/7/2018, 4/18/2009.    TECHNIQUE: CT imaging obtained through the chest without contrast.  Axial, coronal, and sagittal reconstructions and axial MIP reformatted  images are reviewed.     FINDINGS:  Thyroid gland is unremarkable. No axillary, mediastinal, or hilar  lymphadenopathy. Scattered calcified and partially calcified  mediastinal and  bilateral hilar lymph nodes. The heart is not  enlarged. No pericardial effusion. The major thoracic vessels are  normal in caliber and configuration. The esophagus is unremarkable.    Lungs and pleura:  Trace layering mucoid debris within the upper trachea. No pleural  effusion or pneumothorax. No pulmonary consolidation. Moderate apical  predominant centrilobular emphysematous changes. Atelectasis in the  lower lobes posteriorly. Diffuse mild bronchial wall thickening,  greater in the lower lobes. Scattered small calcified granulomas. No  new or enlarging pulmonary nodule.    Upper abdomen:  Hypodensities in the liver are likely cysts.    Bones and soft tissues:  No acute or suspicious osseous abnormality.      Impression    IMPRESSION:   1. Moderate apical predominant centrilobular emphysematous changes and  mild bronchial wall thickening, compatible with COPD.  2. No new or enlarging pulmonary nodule. No pulmonary consolidation.  Consider annual low dose CT lung cancer screening if patient meets  CMS/USPSTF criteria.  3. Sequela of prior granulomatous disease including scattered  calcified granulomas and partially calcified mediastinal and hilar  lymph nodes.    I have personally reviewed the examination and initial interpretation  and I agree with the findings.    TERRY LEE MD         Assessments & Plan (with Medical Decision Making)   Well-appearing man presents with persistent cough, duration over a month and a half, for which she has been seen in the ER at least 5 times during that time.  Also presents with painless bleeding from the urethral meatus.  I spoke with the radiologist on-call today covering chest CT reads.  He notes only chronic findings without any indication the patient has acute pulmonary TB or any other acute infection that needs to be covered with antimicrobials.  No hematuria on analysis.  No signs of UTI and urinalysis.  I spoke with patient about sending his urine for gonorrhea and  chlamydia which he agreed to.  He also agreed to empiric treatment of STI with Rocephin and azithromycin.  Patient understands that he must follow-up with his primary care doctor who will refer him to specialist needed, including urology and pulmonology.  He understands strict return precautions to the ER.  His questions were answered.  He ate a lunch tray while in the ER without complication.    I have reviewed the nursing notes.    I have reviewed the findings, diagnosis, plan and need for follow up with the patient.    New Prescriptions    No medications on file       Final diagnoses:   Cough       2/21/2020   [facility], [place], EMERGENCY DEPARTMENT

## 2020-02-21 NOTE — ED TRIAGE NOTES
"Patient presents to triage with c/o cough. Patient states he has had cough for 2-3 months and has been seen for it a couple times. Patient states he \"sometimes has fevers, sweats, and chills.\" Afebrile during triage. Patient states he is also here because he had bloody discharge from his penis after having intercourse last night.   "

## 2020-02-23 LAB
C TRACH DNA SPEC QL NAA+PROBE: NEGATIVE
N GONORRHOEA DNA SPEC QL NAA+PROBE: NEGATIVE
SPECIMEN SOURCE: NORMAL
SPECIMEN SOURCE: NORMAL

## 2020-03-07 ENCOUNTER — HOSPITAL ENCOUNTER (EMERGENCY)
Facility: CLINIC | Age: 65
Discharge: HOME | End: 2020-03-07
Attending: EMERGENCY MEDICINE | Admitting: EMERGENCY MEDICINE
Payer: COMMERCIAL

## 2020-03-07 ENCOUNTER — APPOINTMENT (OUTPATIENT)
Dept: GENERAL RADIOLOGY | Facility: CLINIC | Age: 65
End: 2020-03-07
Attending: EMERGENCY MEDICINE
Payer: COMMERCIAL

## 2020-03-07 VITALS
BODY MASS INDEX: 26.37 KG/M2 | OXYGEN SATURATION: 98 % | RESPIRATION RATE: 18 BRPM | HEART RATE: 109 BPM | HEIGHT: 68 IN | DIASTOLIC BLOOD PRESSURE: 93 MMHG | WEIGHT: 174 LBS | SYSTOLIC BLOOD PRESSURE: 172 MMHG | TEMPERATURE: 98 F

## 2020-03-07 DIAGNOSIS — R06.2 WHEEZING: ICD-10-CM

## 2020-03-07 DIAGNOSIS — M54.10 RADICULAR LOW BACK PAIN: ICD-10-CM

## 2020-03-07 LAB
ANION GAP SERPL CALCULATED.3IONS-SCNC: 4 MMOL/L (ref 3–14)
BASOPHILS # BLD AUTO: 0 10E9/L (ref 0–0.2)
BASOPHILS NFR BLD AUTO: 0.2 %
BUN SERPL-MCNC: 17 MG/DL (ref 7–30)
CALCIUM SERPL-MCNC: 8.5 MG/DL (ref 8.5–10.1)
CHLORIDE SERPL-SCNC: 110 MMOL/L (ref 94–109)
CO2 SERPL-SCNC: 27 MMOL/L (ref 20–32)
CREAT SERPL-MCNC: 1.62 MG/DL (ref 0.66–1.25)
D DIMER PPP FEU-MCNC: 0.3 UG/ML FEU (ref 0–0.5)
DIFFERENTIAL METHOD BLD: NORMAL
EOSINOPHIL # BLD AUTO: 0.1 10E9/L (ref 0–0.7)
EOSINOPHIL NFR BLD AUTO: 1.3 %
ERYTHROCYTE [DISTWIDTH] IN BLOOD BY AUTOMATED COUNT: 12.3 % (ref 10–15)
GFR SERPL CREATININE-BSD FRML MDRD: 44 ML/MIN/{1.73_M2}
GLUCOSE SERPL-MCNC: 99 MG/DL (ref 70–99)
HCT VFR BLD AUTO: 45.1 % (ref 40–53)
HGB BLD-MCNC: 15.6 G/DL (ref 13.3–17.7)
IMM GRANULOCYTES # BLD: 0 10E9/L (ref 0–0.4)
IMM GRANULOCYTES NFR BLD: 0.2 %
LYMPHOCYTES # BLD AUTO: 1.4 10E9/L (ref 0.8–5.3)
LYMPHOCYTES NFR BLD AUTO: 25.4 %
MCH RBC QN AUTO: 32.6 PG (ref 26.5–33)
MCHC RBC AUTO-ENTMCNC: 34.6 G/DL (ref 31.5–36.5)
MCV RBC AUTO: 94 FL (ref 78–100)
MONOCYTES # BLD AUTO: 0.6 10E9/L (ref 0–1.3)
MONOCYTES NFR BLD AUTO: 11.8 %
NEUTROPHILS # BLD AUTO: 3.3 10E9/L (ref 1.6–8.3)
NEUTROPHILS NFR BLD AUTO: 61.1 %
NRBC # BLD AUTO: 0 10*3/UL
NRBC BLD AUTO-RTO: 0 /100
PLATELET # BLD AUTO: 165 10E9/L (ref 150–450)
POTASSIUM SERPL-SCNC: 3.4 MMOL/L (ref 3.4–5.3)
RBC # BLD AUTO: 4.79 10E12/L (ref 4.4–5.9)
SODIUM SERPL-SCNC: 141 MMOL/L (ref 133–144)
TROPONIN I SERPL-MCNC: <0.015 UG/L (ref 0–0.04)
WBC # BLD AUTO: 5.4 10E9/L (ref 4–11)

## 2020-03-07 PROCEDURE — 99285 EMERGENCY DEPT VISIT HI MDM: CPT | Mod: 25

## 2020-03-07 PROCEDURE — 85025 COMPLETE CBC W/AUTO DIFF WBC: CPT | Performed by: EMERGENCY MEDICINE

## 2020-03-07 PROCEDURE — 85379 FIBRIN DEGRADATION QUANT: CPT | Performed by: EMERGENCY MEDICINE

## 2020-03-07 PROCEDURE — 84484 ASSAY OF TROPONIN QUANT: CPT | Performed by: EMERGENCY MEDICINE

## 2020-03-07 PROCEDURE — 93005 ELECTROCARDIOGRAM TRACING: CPT

## 2020-03-07 PROCEDURE — 94640 AIRWAY INHALATION TREATMENT: CPT

## 2020-03-07 PROCEDURE — 80048 BASIC METABOLIC PNL TOTAL CA: CPT | Performed by: EMERGENCY MEDICINE

## 2020-03-07 PROCEDURE — 71046 X-RAY EXAM CHEST 2 VIEWS: CPT

## 2020-03-07 PROCEDURE — 25000125 ZZHC RX 250: Performed by: EMERGENCY MEDICINE

## 2020-03-07 RX ORDER — METHYLPREDNISOLONE 4 MG
TABLET, DOSE PACK ORAL
Qty: 21 TABLET | Refills: 0 | Status: SHIPPED | OUTPATIENT
Start: 2020-03-07

## 2020-03-07 RX ORDER — IPRATROPIUM BROMIDE AND ALBUTEROL SULFATE 2.5; .5 MG/3ML; MG/3ML
3 SOLUTION RESPIRATORY (INHALATION) ONCE
Status: COMPLETED | OUTPATIENT
Start: 2020-03-07 | End: 2020-03-07

## 2020-03-07 RX ORDER — METHOCARBAMOL 750 MG/1
750 TABLET, FILM COATED ORAL 4 TIMES DAILY PRN
Qty: 28 TABLET | Refills: 0 | Status: SHIPPED | OUTPATIENT
Start: 2020-03-07

## 2020-03-07 RX ORDER — ALBUTEROL SULFATE 90 UG/1
2 AEROSOL, METERED RESPIRATORY (INHALATION) EVERY 4 HOURS PRN
Qty: 1 INHALER | Refills: 0 | Status: SHIPPED | OUTPATIENT
Start: 2020-03-07

## 2020-03-07 RX ADMIN — IPRATROPIUM BROMIDE AND ALBUTEROL SULFATE 3 ML: .5; 3 SOLUTION RESPIRATORY (INHALATION) at 21:30

## 2020-03-07 ASSESSMENT — ENCOUNTER SYMPTOMS
LIGHT-HEADEDNESS: 1
CHILLS: 1
COUGH: 1
RHINORRHEA: 1
WHEEZING: 1
SORE THROAT: 1
BACK PAIN: 1
ARTHRALGIAS: 1
SHORTNESS OF BREATH: 1

## 2020-03-07 ASSESSMENT — MIFFLIN-ST. JEOR: SCORE: 1558.76

## 2020-03-07 NOTE — LETTER
March 7, 2020      To Whom It May Concern:      Abhilash Gonzalez was seen in our Emergency Department today, 03/07/20.  I expect his condition to improve over the next 1-2 days.  He may return to work when improved.    Sincerely,        Kamryn Barillas RN

## 2020-03-07 NOTE — ED AVS SNAPSHOT
Emergency Department  [address] [facility] [address]  Phone:  [phone]  Fax:  [phone]                                    Abhilash Gonzalez   MRN: [id number]    Department:   Emergency Department   Date of Visit:  3/7/2020           After Visit Summary Signature Page    I have received my discharge instructions, and my questions have been answered. I have discussed any challenges I see with this plan with the nurse or doctor.    ..........................................................................................................................................  Patient/Patient Representative Signature      ..........................................................................................................................................  Patient Representative Print Name and Relationship to Patient    ..................................................               ................................................  Date                                   Time    ..........................................................................................................................................  Reviewed by Signature/Title    ...................................................              ..............................................  Date                                               Time          22EPIC Rev 08/18

## 2020-03-08 NOTE — ED PROVIDER NOTES
History     Chief Complaint:  Shortness of Breath & Hip Pain      HPI   Abhilash Gonzalez is a 64 year old male with a history of hypertension, latent TB, cocaine abuse, and chronic low back pain secondary to lumbar DDD who presents to the ED for evaluation of shortness of breath and left hip pain. The patient states that he has been experiencing shortness of breath and wheezing for quite some time. He started using inhalers a couple of days ago, although today he experienced a severe episode of shortness of breath, lightheadedness, and the feeling as though he might pass out. Here in the ED, the patient endorses persistent shortness of breath, as well as some mild pleuritic chest pain. He additionally endorses wheezing, chills, dry cough, pharyngitis, and rhinorrhea. The patient notes that he has been seen in the past for palpitations and received nitroglycerin, but he denies any known cardiac history.  He does smoke cigarettes several times per week.  Of note, the patient has had 4 ED visits in the past month in the setting of cough and hip pain. His most recent visit was on 2/21/20, at which time he received a chest CT negative for acute findings.    The patient additionally presents to the ED for progressively worsening hip pain. He states that this first began 2 months ago after spontaneously waking up with left hip pain. He was unable to walk secondary to the severity of his pain and was sent for x-rays, which reportedly demonstrated arthritis and 2 herniated lumbar discs. The patient notes that his pain originates in the low back and radiates to the left hip and lateral/posterior thigh. He has received steroid injections in the past without any improvement, and he has not yet been through physical therapy. Today, the patient states that his hip pain became severe, and at times he has been having difficulties picking up his left foot. He has not taken Tylenol or ibuprofen.  "    Allergies:  Hydrocodone-Acetaminophen      Medications:    Albuterol  Amlodipine  Tessalon  Ceftin  Lisinopril  Nitroglycerin      Past Medical History:    Osteoarthritis of the spine  DDD, lumbar  Chronic low back pain  Cocaine abuse  Tobacco use disorder  Latent TB  Depression  Hypertension  Polysubstance abuse  Cecal volvulus  Neuralgia    Past Surgical History:    Bronchoscopy  Right palm surgery  Mandible surgery  Right knee surgery    Family History:    Hypertension  Cerebrovascular disease    Social History:  Smoking Status: Current some day smoker   Alcohol Use: Yes (occasional)  Drug Use: Yes (cocaine, marijuana)  Marital Status:  Single [1]     Review of Systems   Constitutional: Positive for chills.   HENT: Positive for rhinorrhea and sore throat.    Respiratory: Positive for cough, shortness of breath and wheezing.    Cardiovascular: Positive for chest pain.   Musculoskeletal: Positive for arthralgias (L hip), back pain and gait problem.   Neurological: Positive for light-headedness.   All other systems reviewed and are negative.    Physical Exam     Patient Vitals for the past 24 hrs:   BP Temp Temp src Pulse Resp SpO2 Height Weight   03/07/20 2106 (!) 172/93 98  F (36.7  C) Temporal 109 18 98 % 1.727 m (5' 8\") 78.9 kg (174 lb)        Physical Exam  Eyes:               Sclera white; Pupils are equal and round  ENT:                External ears and nares normal  CV:                  Rate as above with regular rhythm                           Symmetric radial pulses                          No BLE edema  Resp:               End expiratory wheezing and cough with forced exhalation  GI:                   Abdomen is soft, non-tender  MS:                  Moves all extremities  Skin:                Warm and dry  Neuro:             Speech is normal and fluent. Alert.    Emergency Department Course   ECG:  Indication: Shortness of breath  Time: 2129  Vent. Rate 98 bpm. LA interval 142. QRS duration 88. " QT/QTc 356/454. P-R-T axis 56 55 100.  Normal sinus rhythm. Possible left atrial enlargement. LVH. Nonspecific T wave abnormality. Abnormal ECG. No significant change compared to EKG dated 2/6/20. Read time: 2133    Imaging:  Radiographic findings were communicated with the patient who voiced understanding of the findings.  XR Chest 2 views:   No acute abnormality. As per radiology.     Laboratory:  CBC: WBC: 5.4, HGB: 15.6, PLT: 165  BMP: Cl 110 (H), Creatinine 1.62 (H), GFR estimate if black 51 (L), o/w WNL    2125 Troponin: <0.015     D dimer: 0.3    Interventions:  2130 Duoneb 3 mL Nebulization     Emergency Department Course:  Nursing notes and vitals reviewed. (2115) I performed an exam of the patient as documented above.     IV inserted. Medicine administered as documented above. Blood drawn. This was sent to the lab for further testing, results above.    EKG obtained in the ED, see results above.      The patient was sent for a chest x-ray while in the emergency department, findings above.     (2229) I rechecked the patient and discussed the results of his workup thus far. We discussed the plan for discharge, and he felt comfortable going home.    Findings and plan explained to the Patient. Patient discharged home with instructions regarding supportive care, medications, and reasons to return. The importance of close follow-up was reviewed. The patient was prescribed an albuterol inhaler, Robaxin, and Medrol Dosepak.    I personally reviewed the laboratory results with the Patient and answered all related questions prior to discharge.      Impression & Plan    Medical Decision Making:  Abhilash Gonzalez is a 64 year old male who is here for evaluation of two separate areas of concern. He has a left lower back and leg pain consistent with a description of radicular pain. There are no symptoms to suggest cauda equina, and emergent neuroimaging is not indicated. This is a chronic problem for him, and I  encouraged him to follow-up with his clinic that had provided a previous spine injection. He also has shortness of breath and some chest tightness. EKG was obtained without dysrhythmia or ischemia. Troponin is undetectable, and therefore ACS is not clinically suspected. Chest x-ray was obtained with no evidence of pneumonia or pleural effusion. He has end-expiratory wheezing consistent with bronchitis. He is a some-day smoker, and the role of this and progressive lung problems was discussed with him. He received a Duoneb here. He will be started on a Medrol Dosepak to help both with wheezing and with his leg symptoms. Inhaler was prescribed, as well as a muscle relaxant.    Diagnosis:    ICD-10-CM    1. Wheezing R06.2    2. Radicular low back pain M54.10        Disposition:  discharged to home    Discharge Medications:  New Prescriptions    ALBUTEROL (PROAIR HFA) 108 (90 BASE) MCG/ACT INHALER    Inhale 2 puffs into the lungs every 4 hours as needed for shortness of breath / dyspnea    METHOCARBAMOL (ROBAXIN) 750 MG TABLET    Take 1 tablet (750 mg) by mouth 4 times daily as needed (muscle pain/spasm)    METHYLPREDNISOLONE (MEDROL DOSEPAK) 4 MG TABLET THERAPY PACK    Follow Package Directions       Scribe Disclosure:  I, Carly Pearl, am serving as a scribe on 3/7/2020 at 9:15 PM to personally document services performed by Deya Lynn, * based on my observations and the provider's statements to me.      Carly Pearl  3/7/2020    EMERGENCY DEPARTMENT       Deya Lynn MD  03/09/20 0155

## 2020-03-08 NOTE — ED TRIAGE NOTES
Patient complaints of left hip pain.  Has 3 slipped discs in back.  Hip pain for 2 months.  Woke up one day & could not walk.  Ambulated to registration desk & to triage room    SOB, started using inhalers a couple of days ago.  Chest tightness, comes & goes.  Feeling lightheaded.

## 2020-03-09 ENCOUNTER — HOSPITAL ENCOUNTER (EMERGENCY)
Facility: CLINIC | Age: 65
Discharge: HOME | End: 2020-03-09
Attending: EMERGENCY MEDICINE | Admitting: EMERGENCY MEDICINE
Payer: COMMERCIAL

## 2020-03-09 VITALS
HEIGHT: 68 IN | SYSTOLIC BLOOD PRESSURE: 156 MMHG | WEIGHT: 175 LBS | BODY MASS INDEX: 26.52 KG/M2 | DIASTOLIC BLOOD PRESSURE: 96 MMHG | TEMPERATURE: 98.7 F | OXYGEN SATURATION: 99 %

## 2020-03-09 DIAGNOSIS — R05.9 COUGH: ICD-10-CM

## 2020-03-09 DIAGNOSIS — M25.552 HIP PAIN, LEFT: ICD-10-CM

## 2020-03-09 PROCEDURE — 25000132 ZZH RX MED GY IP 250 OP 250 PS 637: Performed by: EMERGENCY MEDICINE

## 2020-03-09 PROCEDURE — 99283 EMERGENCY DEPT VISIT LOW MDM: CPT

## 2020-03-09 PROCEDURE — 93005 ELECTROCARDIOGRAM TRACING: CPT

## 2020-03-09 RX ORDER — IBUPROFEN 600 MG/1
600 TABLET, FILM COATED ORAL ONCE
Status: COMPLETED | OUTPATIENT
Start: 2020-03-09 | End: 2020-03-09

## 2020-03-09 RX ORDER — ACETAMINOPHEN 500 MG
1000 TABLET ORAL ONCE
Status: COMPLETED | OUTPATIENT
Start: 2020-03-09 | End: 2020-03-09

## 2020-03-09 RX ADMIN — ACETAMINOPHEN 1000 MG: 500 TABLET, FILM COATED ORAL at 02:26

## 2020-03-09 RX ADMIN — IBUPROFEN 600 MG: 600 TABLET ORAL at 02:27

## 2020-03-09 ASSESSMENT — ENCOUNTER SYMPTOMS
COUGH: 1
VOMITING: 0
FEVER: 0
ARTHRALGIAS: 1
ABDOMINAL PAIN: 0

## 2020-03-09 ASSESSMENT — MIFFLIN-ST. JEOR: SCORE: 1563.29

## 2020-03-09 NOTE — ED AVS SNAPSHOT
Emergency Department  [address] [facility] [address]  Phone:  [phone]  Fax:  [phone]                                    Abhilash Gonzalez   MRN: [id number]    Department:   Emergency Department   Date of Visit:  3/9/2020           After Visit Summary Signature Page    I have received my discharge instructions, and my questions have been answered. I have discussed any challenges I see with this plan with the nurse or doctor.    ..........................................................................................................................................  Patient/Patient Representative Signature      ..........................................................................................................................................  Patient Representative Print Name and Relationship to Patient    ..................................................               ................................................  Date                                   Time    ..........................................................................................................................................  Reviewed by Signature/Title    ...................................................              ..............................................  Date                                               Time          22EPIC Rev 08/18

## 2020-03-09 NOTE — DISCHARGE INSTRUCTIONS
Please fill your prescription from yesterday and continue with your inhaler along with tylenol for your hip pain.  Please see your doctor regarding your on going symptoms.

## 2020-03-09 NOTE — ED PROVIDER NOTES
History     Chief Complaint:  Cough     HPI  Abhilash Gonzalez is a 64 year old male with a history of hypertension, latent TB, polysubstance abuse, and chronic low back pain who presents with a cough. The patient has been having symptoms of shortness of breath and hip pain for a while and was seen here yesterday for this. She was prescribed a steroid pack but has not yet filled this. Here, She is complaining of chest pain, cough, continued leg pain and states she wants to get tested for HIV. She states she took aspirin around 1400.     Allergies:  Hydrocodone-acetaminophen    Medications:    albuterol (PROAIR HFA) 108 (90 Base) MCG/ACT inhaler  albuterol (PROAIR HFA/PROVENTIL HFA/VENTOLIN HFA) 108 (90 Base) MCG/ACT inhaler  amLODIPine (NORVASC) 5 MG tablet  benzonatate (TESSALON) 200 MG capsule  cefuroxime (CEFTIN) 500 MG tablet  doxycycline hyclate (VIBRAMYCIN) 100 MG capsule  lisinopril (PRINIVIL/ZESTRIL) 10 MG tablet  methocarbamol (ROBAXIN) 750 MG tablet  methylPREDNISolone (MEDROL DOSEPAK) 4 MG tablet therapy pack  nitroglycerin (NITROSTAT) 0.4 MG SL tablet    Past Medical History:    Back pain, chronic   Depression   Degenerative joint disease   Hypertension   Polysubstance abuse  TB   Cecal volvulus   Spondylosis     Past Surgical History:    Bronchoscopy   Colonoscopy   Hand surgery   Mandible surgery   Knee surgery      Family History:    Hypertension   Cerebrovascular disease      Social History:  Marital Status:  Single   Smoker:   Current   Smokeless:   Never   Alcohol:   Yes, occasional   Drugs:   Yes, cocaine, marijuana     Review of Systems   Constitutional: Negative for fever.   Respiratory: Positive for cough.    Cardiovascular: Positive for chest pain.   Gastrointestinal: Negative for abdominal pain and vomiting.   Musculoskeletal: Positive for arthralgias.   All other systems reviewed and are negative.    Physical Exam     Patient Vitals for the past 24 hrs:   BP Temp Temp src Heart Rate SpO2  "Height Weight   03/09/20 0149 (!) 156/96 98.7  F (37.1  C) Oral 102 99 % 1.727 m (5' 8\") 79.4 kg (175 lb)     Physical Exam  General: Appears well-developed and well-nourished.   Head: No signs of trauma.   CV: Normal rate and regular rhythm.    Resp: Effort normal and breath sounds normal. No respiratory distress.   GI: Soft. There is no tenderness.  No rebound or guarding.  Normal bowel sounds.  No CVA tenderness.  MSK: Normal range of motion. no edema. No Calf tenderness.  Ambulatory.  Neuro: The patient is alert and oriented.  PERRLA, EOMI, strength in upper/lower extremities normal and symmetrical.   Sensation normal. Speech normal.  GCS 15  Skin: Skin is warm and dry. No rash noted.   Psych: normal mood and affect. behavior is normal.       Emergency Department Course   ECG:  Indication: chest pain  Time: 0215  Vent. Rate 79 bpm. KY interval 138. QRS duration 86. QT/QTc 384/440. P-R-T axis 59 59 82. Normal sinus rhythm with sinus arrhythmia. Minimal voltage criteria for LVH, may be normal variant. Nonspecific T wave abnormality. Abnormal ECG. Read time: 0218    Interventions:  0226: Tylenol 1000 mg PO  0227: Ibuprofen 600 mg PO     Emergency Department Course:  0200 I performed an exam of the patient as documented above.   0240 I rechecked the patient and discussed the results of their workup thus far.     Findings and plan explained. Patient discharged home with instructions regarding supportive care and reasons to return. The importance of close follow-up was reviewed. I personally reviewed the workup results with them and answered all related questions prior to discharge.    Impression & Plan    Medical Decision Making:  Abhilash Gonzalez is a 64 year old male who presents for cough, low back pain and hip pain.  The symptoms seem to have been going on for quite some time and he has been evaluated multiple times for it including yesterday.  He had been given a prescription for Medrol Dosepak which should help " for both COPD which patient has a history of and possibly for low back pain.  He had not gotten this prescription filled yet.  His vital signs were non-concerning and a screening EKG was reassuring.  I did not feel that further work-up such as blood work or imaging was indicated given he just had this done. The patient was able to stand and ambulate without difficulty in the ER.  He was given a dose of Tylenol and ibuprofen and recommended to continue with these along with filling the Medrol Dosepak.  He was also recommended follow-up with his primary care doctor for further evaluation and management of what seems to be chronic symptoms.  I also recommended seeing his clinic for STD testing.    Diagnosis:    ICD-10-CM    1. Hip pain, left  M25.552    2. Cough  R05      Disposition:  Discharged to home.    Scribe Disclosure: I, Ken Sorensen, am serving as a scribe on 3/9/2020 at 2:00 AM to personally document services performed by Abhilash Haywood MD based on my observations and the provider's statements to me.   Ken Sorensen  [facility]     Abhilash Haywood MD  03/09/20 0558

## 2020-03-10 LAB — INTERPRETATION ECG - MUSE: NORMAL

## 2020-03-11 ENCOUNTER — TRANSFERRED RECORDS (OUTPATIENT)
Dept: HEALTH INFORMATION MANAGEMENT | Facility: CLINIC | Age: 65
End: 2020-03-11

## 2020-05-09 ENCOUNTER — HOSPITAL ENCOUNTER (EMERGENCY)
Facility: CLINIC | Age: 65
Discharge: HOME | End: 2020-05-10
Attending: EMERGENCY MEDICINE
Payer: MEDICAID

## 2020-05-09 VITALS
SYSTOLIC BLOOD PRESSURE: 132 MMHG | RESPIRATION RATE: 14 BRPM | DIASTOLIC BLOOD PRESSURE: 99 MMHG | TEMPERATURE: 96.2 F | HEART RATE: 102 BPM | WEIGHT: 174 LBS | OXYGEN SATURATION: 96 % | BODY MASS INDEX: 26.37 KG/M2 | HEIGHT: 68 IN

## 2020-05-09 DIAGNOSIS — S70.02XA CONTUSION OF HIP AND THIGH, LEFT, INITIAL ENCOUNTER: ICD-10-CM

## 2020-05-09 DIAGNOSIS — S70.12XA CONTUSION OF HIP AND THIGH, LEFT, INITIAL ENCOUNTER: ICD-10-CM
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
## 2020-05-09 DIAGNOSIS — S09.90XA CLOSED HEAD INJURY, INITIAL ENCOUNTER: ICD-10-CM

## 2020-05-09 PROCEDURE — 99285 EMERGENCY DEPT VISIT HI MDM: CPT | Mod: 25

## 2020-05-09 PROCEDURE — 93005 ELECTROCARDIOGRAM TRACING: CPT

## 2020-05-09 ASSESSMENT — ENCOUNTER SYMPTOMS
CHILLS: 0
SHORTNESS OF BREATH: 0
WEAKNESS: 0
ARTHRALGIAS: 1
NUMBNESS: 0
FEVER: 0

## 2020-05-09 ASSESSMENT — MIFFLIN-ST. JEOR: SCORE: 1558.76

## 2020-05-09 NOTE — ED AVS SNAPSHOT
Emergency Department  [address] [facility] [address]  Phone:  [phone]  Fax:  [phone]                                    Abhilash Gonzalez   MRN: [id number]    Department:   Emergency Department   Date of Visit:  5/9/2020           After Visit Summary Signature Page    I have received my discharge instructions, and my questions have been answered. I have discussed any challenges I see with this plan with the nurse or doctor.    ..........................................................................................................................................  Patient/Patient Representative Signature      ..........................................................................................................................................  Patient Representative Print Name and Relationship to Patient    ..................................................               ................................................  Date                                   Time    ..........................................................................................................................................  Reviewed by Signature/Title    ...................................................              ..............................................  Date                                               Time          22EPIC Rev 08/18

## 2020-05-10 ENCOUNTER — APPOINTMENT (OUTPATIENT)
Dept: CT IMAGING | Facility: CLINIC | Age: 65
End: 2020-05-10
Attending: EMERGENCY MEDICINE
Payer: MEDICAID

## 2020-05-10 ENCOUNTER — APPOINTMENT (OUTPATIENT)
Dept: GENERAL RADIOLOGY | Facility: CLINIC | Age: 65
End: 2020-05-10
Attending: EMERGENCY MEDICINE
Payer: MEDICAID

## 2020-05-10 LAB — INTERPRETATION ECG - MUSE: NORMAL

## 2020-05-10 PROCEDURE — 70450 CT HEAD/BRAIN W/O DYE: CPT

## 2020-05-10 PROCEDURE — 73502 X-RAY EXAM HIP UNI 2-3 VIEWS: CPT

## 2020-05-10 PROCEDURE — 25000132 ZZH RX MED GY IP 250 OP 250 PS 637: Performed by: EMERGENCY MEDICINE

## 2020-05-10 PROCEDURE — 71046 X-RAY EXAM CHEST 2 VIEWS: CPT

## 2020-05-10 RX ORDER — ACETAMINOPHEN 500 MG
1000 TABLET ORAL ONCE
Status: COMPLETED | OUTPATIENT
Start: 2020-05-10 | End: 2020-05-10

## 2020-05-10 RX ORDER — IBUPROFEN 600 MG/1
600 TABLET, FILM COATED ORAL ONCE
Status: COMPLETED | OUTPATIENT
Start: 2020-05-10 | End: 2020-05-10

## 2020-05-10 RX ADMIN — IBUPROFEN 600 MG: 600 TABLET ORAL at 00:52

## 2020-05-10 RX ADMIN — ACETAMINOPHEN 1000 MG: 500 TABLET ORAL at 00:52

## 2020-05-10 NOTE — ED PROVIDER NOTES
"  History     Chief Complaint:  Hip Pain    HPI   Abhilash Gonzalez is a 64 year old male, with chronic lower back pain, who presents with left hip pain after he stumbled and fell a few hours ago. Patient states that he can walk on it and walks into the ER today. Patient denies LOC, but does endorse hitting his head on the fall. Patient has no other concerns today.    Allergies:  Acetaminophen-Hydrocodone  Lisinopril  Morphine and Related     Medications:    Methocarbamol  tamsulosin  Finasteride  ipratropiu,-albuterol  amlodipine     Past Medical History:    Chronic back pain  Depression  Anxiety  GERD  Hypertension    Past Surgical History:    Bronchoscopy  Colonoscopy  Right palmar surgery  Mandible surgery  Knee surgery, right    Family History:    Mother - Hypertension  Brother - Cerebrovascular Disease and prostate cancer    Social History:  Tobacco use: Current every day smoker; 1 pack/day for 15 years  Alcohol use: Yes, occasionally  Drug use: Yes, cocaine, marijuana  Marital Status:  Single [1]     Review of Systems   Constitutional: Negative for chills and fever.   Respiratory: Negative for shortness of breath.    Musculoskeletal: Positive for arthralgias (Left hip).   Neurological: Negative for weakness and numbness.   All other systems reviewed and are negative.      Physical Exam     Patient Vitals for the past 24 hrs:   BP Temp Temp src Pulse Resp SpO2 Height Weight   05/09/20 2324 (!) 132/99 96.2  F (35.7  C) Temporal 102 14 96 % 1.727 m (5' 8\") 78.9 kg (174 lb)       Physical Exam  General: Appears well-developed and well-nourished.   Head: No signs of trauma.   Neck: Normal range of motion. No midline tenderness.  CV: Normal rate and regular rhythm.    Resp: Effort normal and breath sounds normal. No respiratory distress.   GI: Soft. There is no tenderness.  No rebound or guarding.  Normal bowel sounds.    MSK: +tenderness to left lateral hip.  No clear bony deformities.  2+ DP pulses and cap refill " distally.    Neuro: The patient is alert and oriented.  Strength in upper/lower extremities normal and symmetrical. Sensation normal. Speech normal.  GCS 15  Skin: Skin is warm and dry. No rash noted.   Psych: normal mood and affect. behavior is normal.       Emergency Department Course     ECG:  Indication: Chest Pain Equivalent  Time: 2359  Vent. Rate 86 bpm. NM interval 156. QRS duration 90. QT/QTc 436/521. P-R-T axis 58 28 102.    Normal Sinus rhythm  Voltage criteria for left ventricular hypertrophy  T wave abnormality, consider lateral ischemia. Prolonged QT  Abnormal ECG.  No significant change compared to EKG dated 03/04/20. Read time: 0001    Imaging:  Radiology findings were communicated with the patient who voiced understanding of the findings.    XR Pelvis and Hip Left 1 View   IMPRESSION: No acute fracture or dislocation.    Chest XR,  PA & LAT  IMPRESSION:   No acute abnormality.    Head CT w/o contrast  IMPRESSION:  1.  No acute intracranial process.  Readings per Radiology    Interventions:  0052 Tylenol 1,000 mg PO  0052 ibuprofen 600 mg PO    Emergency Department Course:  Past medical records, nursing notes, and vitals reviewed.    2346 I performed an exam of the patient as documented above.     EKG obtained in the ED, see results above.   The patient was sent for a XR Pelvis and Hip, Left, Chest XR, and head CT while in the emergency department, results above.     0042 I rechecked the patient and discussed the results of his workup thus far including plans for discharge.    Findings and plan explained to the Patient. Patient discharged home with instructions regarding supportive care, medications, and reasons to return. The importance of close follow-up was reviewed.     I personally reviewed the imaging results with the Patient and answered all related questions prior to discharge.     Impression & Plan   Medical Decision Making:  Abhilash Gonzalez is a 64-year-old gentleman who presents due to left  hip pain.  He states that he stumbled a couple of hours ago and fell primarily onto the left hip. He also believes that he hit his chest and head although he does not think he fully lost consciousness. On my evaluation, he did have some tenderness the left hip.  There is no bony deformity and is neurovascularly intact distally. The remainder of his exam was benign.  I did obtain a CT and x-ray of the hip and chest along with a CT scan of the head.  These did not show any signs of acute process.  Patient has been ambulatory since the fall.  In this setting, feel is appropriate for discharge.  Recommended supportive care and is recommended follow-up with primary care doctor for further evaluation if he had ongoing symptoms.    Diagnosis:    ICD-10-CM    1. Fall from standing, initial encounter  W19.XXXA    2. Contusion of hip and thigh, left, initial encounter  S70.02XA     S70.12XA    3. Closed head injury, initial encounter  S09.90XA        Disposition:  Discharged to home.    Scribe Disclosure:  I, Jovi Castro, am serving as a scribe at 11:46 PM on 5/9/2020 to document services personally performed by Abhilash Haywood MD based on my observations and the provider's statements to me.   5/9/2020    EMERGENCY DEPARTMENT       Abhilash Haywood MD  05/10/20 1366

## 2021-08-14 ENCOUNTER — HOSPITAL ENCOUNTER (EMERGENCY)
Facility: CLINIC | Age: 66
Discharge: HOME | End: 2021-08-14
Attending: EMERGENCY MEDICINE | Admitting: EMERGENCY MEDICINE
Payer: COMMERCIAL

## 2021-08-14 ENCOUNTER — APPOINTMENT (OUTPATIENT)
Dept: GENERAL RADIOLOGY | Facility: CLINIC | Age: 66
End: 2021-08-14
Attending: EMERGENCY MEDICINE
Payer: COMMERCIAL

## 2021-08-14 VITALS
OXYGEN SATURATION: 95 % | WEIGHT: 178 LBS | HEIGHT: 68 IN | DIASTOLIC BLOOD PRESSURE: 85 MMHG | SYSTOLIC BLOOD PRESSURE: 147 MMHG | BODY MASS INDEX: 26.98 KG/M2 | TEMPERATURE: 98.2 F | RESPIRATION RATE: 19 BRPM | HEART RATE: 100 BPM

## 2021-08-14 DIAGNOSIS — R07.9 CHEST PAIN, UNSPECIFIED TYPE: ICD-10-CM

## 2021-08-14 DIAGNOSIS — I10 ESSENTIAL HYPERTENSION: ICD-10-CM

## 2021-08-14 LAB
ALBUMIN SERPL-MCNC: 3.8 G/DL (ref 3.4–5)
ALP SERPL-CCNC: 76 U/L (ref 40–150)
ALT SERPL W P-5'-P-CCNC: 53 U/L (ref 0–70)
ANION GAP SERPL CALCULATED.3IONS-SCNC: 6 MMOL/L (ref 3–14)
AST SERPL W P-5'-P-CCNC: 17 U/L (ref 0–45)
ATRIAL RATE - MUSE: 111 BPM
BASOPHILS # BLD AUTO: 0 10E3/UL (ref 0–0.2)
BASOPHILS NFR BLD AUTO: 0 %
BILIRUB SERPL-MCNC: 1.2 MG/DL (ref 0.2–1.3)
BUN SERPL-MCNC: 13 MG/DL (ref 7–30)
CALCIUM SERPL-MCNC: 8.7 MG/DL (ref 8.5–10.1)
CHLORIDE BLD-SCNC: 110 MMOL/L (ref 94–109)
CO2 SERPL-SCNC: 24 MMOL/L (ref 20–32)
CREAT SERPL-MCNC: 1.37 MG/DL (ref 0.66–1.25)
DIASTOLIC BLOOD PRESSURE - MUSE: NORMAL MMHG
EOSINOPHIL # BLD AUTO: 0 10E3/UL (ref 0–0.7)
EOSINOPHIL NFR BLD AUTO: 1 %
ERYTHROCYTE [DISTWIDTH] IN BLOOD BY AUTOMATED COUNT: 13.8 % (ref 10–15)
GFR SERPL CREATININE-BSD FRML MDRD: 54 ML/MIN/1.73M2
GLUCOSE BLD-MCNC: 74 MG/DL (ref 70–99)
HCT VFR BLD AUTO: 47.4 % (ref 40–53)
HGB BLD-MCNC: 15.7 G/DL (ref 13.3–17.7)
HOLD SPECIMEN: NORMAL
HOLD SPECIMEN: NORMAL
IMM GRANULOCYTES # BLD: 0 10E3/UL
IMM GRANULOCYTES NFR BLD: 0 %
INTERPRETATION ECG - MUSE: NORMAL
LYMPHOCYTES # BLD AUTO: 1.2 10E3/UL (ref 0.8–5.3)
LYMPHOCYTES NFR BLD AUTO: 22 %
MCH RBC QN AUTO: 30.2 PG (ref 26.5–33)
MCHC RBC AUTO-ENTMCNC: 33.1 G/DL (ref 31.5–36.5)
MCV RBC AUTO: 91 FL (ref 78–100)
MONOCYTES # BLD AUTO: 0.6 10E3/UL (ref 0–1.3)
MONOCYTES NFR BLD AUTO: 10 %
NEUTROPHILS # BLD AUTO: 3.6 10E3/UL (ref 1.6–8.3)
NEUTROPHILS NFR BLD AUTO: 67 %
NRBC # BLD AUTO: 0 10E3/UL
NRBC BLD AUTO-RTO: 0 /100
P AXIS - MUSE: 72 DEGREES
PLATELET # BLD AUTO: 217 10E3/UL (ref 150–450)
POTASSIUM BLD-SCNC: 3.2 MMOL/L (ref 3.4–5.3)
PR INTERVAL - MUSE: 140 MS
PROT SERPL-MCNC: 7.8 G/DL (ref 6.8–8.8)
QRS DURATION - MUSE: 92 MS
QT - MUSE: 366 MS
QTC - MUSE: 497 MS
R AXIS - MUSE: 46 DEGREES
RBC # BLD AUTO: 5.2 10E6/UL (ref 4.4–5.9)
SODIUM SERPL-SCNC: 140 MMOL/L (ref 133–144)
SYSTOLIC BLOOD PRESSURE - MUSE: NORMAL MMHG
T AXIS - MUSE: 84 DEGREES
TROPONIN I SERPL-MCNC: <0.015 UG/L (ref 0–0.04)
TROPONIN I SERPL-MCNC: <0.015 UG/L (ref 0–0.04)
VENTRICULAR RATE- MUSE: 111 BPM
WBC # BLD AUTO: 5.5 10E3/UL (ref 4–11)

## 2021-08-14 PROCEDURE — 96374 THER/PROPH/DIAG INJ IV PUSH: CPT

## 2021-08-14 PROCEDURE — 84484 ASSAY OF TROPONIN QUANT: CPT | Performed by: EMERGENCY MEDICINE

## 2021-08-14 PROCEDURE — 82040 ASSAY OF SERUM ALBUMIN: CPT | Performed by: EMERGENCY MEDICINE

## 2021-08-14 PROCEDURE — 250N000013 HC RX MED GY IP 250 OP 250 PS 637: Performed by: EMERGENCY MEDICINE

## 2021-08-14 PROCEDURE — 258N000003 HC RX IP 258 OP 636: Performed by: EMERGENCY MEDICINE

## 2021-08-14 PROCEDURE — 99285 EMERGENCY DEPT VISIT HI MDM: CPT | Mod: 25

## 2021-08-14 PROCEDURE — 96361 HYDRATE IV INFUSION ADD-ON: CPT

## 2021-08-14 PROCEDURE — 85025 COMPLETE CBC W/AUTO DIFF WBC: CPT | Performed by: EMERGENCY MEDICINE

## 2021-08-14 PROCEDURE — 36415 COLL VENOUS BLD VENIPUNCTURE: CPT | Performed by: EMERGENCY MEDICINE

## 2021-08-14 PROCEDURE — 71046 X-RAY EXAM CHEST 2 VIEWS: CPT

## 2021-08-14 PROCEDURE — 80053 COMPREHEN METABOLIC PANEL: CPT | Performed by: EMERGENCY MEDICINE

## 2021-08-14 PROCEDURE — 250N000009 HC RX 250: Performed by: EMERGENCY MEDICINE

## 2021-08-14 PROCEDURE — 93005 ELECTROCARDIOGRAM TRACING: CPT

## 2021-08-14 RX ORDER — AMLODIPINE BESYLATE 10 MG/1
10 TABLET ORAL DAILY
Qty: 30 TABLET | Refills: 0 | Status: SHIPPED | OUTPATIENT
Start: 2021-08-14 | End: 2022-12-09

## 2021-08-14 RX ORDER — NITROGLYCERIN 0.4 MG/1
0.4 TABLET SUBLINGUAL EVERY 5 MIN PRN
Status: DISCONTINUED | OUTPATIENT
Start: 2021-08-14 | End: 2021-08-14 | Stop reason: HOSPADM

## 2021-08-14 RX ORDER — OMEPRAZOLE 20 MG/1
20 TABLET, DELAYED RELEASE ORAL DAILY
Qty: 14 TABLET | Refills: 0 | Status: SHIPPED | OUTPATIENT
Start: 2021-08-14 | End: 2021-08-28

## 2021-08-14 RX ORDER — AMLODIPINE BESYLATE 5 MG/1
10 TABLET ORAL ONCE
Status: COMPLETED | OUTPATIENT
Start: 2021-08-14 | End: 2021-08-14

## 2021-08-14 RX ADMIN — FAMOTIDINE 20 MG: 10 INJECTION, SOLUTION INTRAVENOUS at 06:24

## 2021-08-14 RX ADMIN — NITROGLYCERIN 0.4 MG: 0.4 TABLET SUBLINGUAL at 06:32

## 2021-08-14 RX ADMIN — NITROGLYCERIN 0.4 MG: 0.4 TABLET SUBLINGUAL at 06:25

## 2021-08-14 RX ADMIN — SODIUM CHLORIDE 1000 ML: 9 INJECTION, SOLUTION INTRAVENOUS at 06:46

## 2021-08-14 RX ADMIN — AMLODIPINE BESYLATE 10 MG: 5 TABLET ORAL at 06:22

## 2021-08-14 RX ADMIN — LIDOCAINE HYDROCHLORIDE 30 ML: 20 SOLUTION ORAL; TOPICAL at 06:23

## 2021-08-14 ASSESSMENT — MIFFLIN-ST. JEOR
SCORE: 1566.9
SCORE: 1566.9

## 2021-08-14 ASSESSMENT — ENCOUNTER SYMPTOMS
BLOOD IN STOOL: 0
DIZZINESS: 0
NAUSEA: 0

## 2021-08-14 NOTE — ED PROVIDER NOTES
History   Chief Complaint:  Chest Pain          Abhilash Gonzalez is a 66 year old male with history of hypertension and polysubstance abuse who presents with chest pain. The patient states that 2 nights ago he began feeling sharp pain in the center of his chest, rating this pain a 9/10. This pain was intermittent but over the past day has become more persistent. Here in the ED he rates his pain a 8/10. He also describes waiting for a bus recently and having a syncopic episode after feeling dizzy and nauseated at the bust stop. The patient does use cocaine and last used about 1-2 days ago. History of high blood pressure, but has not taken his medications for it  Recently because he ran out. Denies blood in stool.    Review of Systems   Cardiovascular: Positive for chest pain.   Gastrointestinal: Negative for blood in stool and nausea.   Neurological: Negative for dizziness.   All other systems reviewed and are negative.      Allergies:  Lisinopril  Hydrocodone-Acetaminophen  Morphine    Medications:  Albuterol  Ondansetron  Finasteride  Amlodipine  Methylprednisolone    Past Medical History:    Depression  Degenerative joint disease  Hypertension  Polysubstance abuse  TB lung, latent  Cecal volvulus  Neuralgia and neuritis  Chronic low back pain  Osteoarthritis  Spondylosis  Cocaine abuse  Tobacco use disorder  Elevated PSA  Chronic cough  Abnormal results of Mantoux test    Past Surgical History:    Bronchoscopy  Colonoscopy  Right hand surgery  Mandible surgery  Knee surgery, right     Family History:    Mother: hypertension  Brother: cerebrovascular disease    Social History:  Presents alone    Physical Exam     Patient Vitals for the past 24 hrs:   BP Temp Temp src Pulse Resp SpO2 Height Weight   08/14/21 0626 (!) 175/99 -- -- 99 -- 95 % -- --   08/14/21 0622 (!) 174/116 -- -- -- -- -- -- --   08/14/21 0600 (!) 174/116 -- -- 98 -- 96 % -- --   08/14/21 0548 -- -- -- -- -- 97 % -- --   08/14/21 0547 -- -- -- --  "-- 97 % -- --   08/14/21 0546 (!) 165/108 -- -- 92 -- -- -- --   08/14/21 0347 (!) 164/110 -- -- 85 15 96 % -- --   08/14/21 0043 (!) 183/103 98.2  F (36.8  C) Temporal 110 18 95 % 1.727 m (5' 8\") 80.7 kg (178 lb)   08/14/21 0042 -- -- -- -- -- -- 1.727 m (5' 8\") 80.7 kg (178 lb)       Physical Exam  Constitutional:  Cooperative.   HENT:   Head:    Atraumatic.   Mouth/Throat:   Oropharynx is without erythema or exudate and mucous membranes are moist.   Eyes:    Conjunctivae normal and EOM are normal.      Pupils are equal, round, and reactive to light.   Neck:    Normal range of motion. Neck supple.   Cardiovascular:  Normal rate, regular rhythm, normal heart sounds and radial and dorsalis pedis pulses are 2+ and symmetric.    Pulmonary/Chest:  Effort normal and breath sounds normal.   Abdominal:   Soft. Bowel sounds are normal.      No splenomegaly or hepatomegaly. No tenderness. No rebound.   Musculoskeletal:  Normal range of motion. No edema and no tenderness.   Neurological:  Alert. Normal strength. No cranial nerve deficit. GCS 15  Skin:    Skin is warm and dry.   Psychiatric:   Normal mood and affect.     Emergency Department Course   ECG  ECG taken at 0034, ECG read at [address]  Sinus tachycardia with premature atrial complexes  Biatrial enlargement  Left ventricular hypertrophy  Abnormal ECG  QT improved, T waves improved, premature atrial complexes now present as compared to prior, dated 05/09/20.  Rate 111 bpm. FL interval 140 ms. QRS duration 92 ms. QT/QTc 366/497 ms. P-R-T axes 72 46 84.     Imaging:  Chest XR,  PA & LAT  Heart size is normal. Tortuous thoracic aorta. No focal airspace infiltrate. No pneumothorax. Slight blunting of the right costophrenic angle is new and may reflect a trace pleural effusion.    As per Radiology    Laboratory:  Troponin (Collected 0049): <0.015    Troponin (Collected 0346): <0.015    CBC: WBC 5.5, HGB 15.7,     CMP: Potassium: 3.2 (L), Chloride: 110 (H), GFR: 54 (L) " o/w WNL (Creatinine 1.37 (H))     Emergency Department Course:    Reviewed:  I reviewed nursing notes, vitals, past medical history and care everywhere    Assessments:  0605 I obtained history and examined the patient as noted above.      I rechecked the patient and explained findings.       Interventions:  0622 Amlodipine 10 mg PO    0623 Gi Cocktail    0624 Famotidine 20 mg IV    0625 Nitroglycerin 0.4 mg Sublingual    NS bolus 1L IV    Disposition:  The patient was discharged to home.       Impression & Plan     Medical Decision Making:  Abhilash Gonzalez is a 66 year old male who presents for evaluation of chest pain for greater than 8 hours. Initial laboratory and imaging tests have come back normal after greater than eight hours of constant pain without progression of symptoms or other new concerning symptoms.   There is no clinical, laboratory, or radiographic evidence of pulmonary embolism, aortic dissection, pneumonia, pneumothorax or cardiac ischemia.  Other etiologies of chest pain considered included myocarditis, pericarditis, acute valvular insufficiency, chest wall source, esophageal spasm or GI source, pleuritis, referred pain, etc.  I have no suspicion of unstable angina at this point and will therefore not admit formally and administer heparin.  I offered the patient to set up for an outpatient stress test, but he declined.  He prefers to see his primary care providers at [facility].    Patient is noted to have hypertension.  He reports he is run out of his Norvasc about a week ago.  He got a dose here.  I prescribed a 30-day supply.  I have asked him to be sure to follow-up with his primary care clinic for blood pressure recheck and to make sure that his pressures are adequately controlled.    Patient also seems to be having some symptoms consistent with reflux.  I will have him take omeprazole and modify his diet.  There is no sign of GI bleeding.  There is no abdominal tenderness.  General  precautions regarding chest pain and reflux provided for home.    We discussed test results and needed follow-up and the patient voices understanding.  Pain had resolved prior to discharge.          Covid-19  Abhilash Gonzalez was evaluated during a global COVID-19 pandemic, which necessitated consideration that the patient might be at risk for infection with the SARS-CoV-2 virus that causes COVID-19.   Applicable protocols for evaluation were followed during the patient's care.   COVID-19 was considered as part of the patient's evaluation. The plan for testing is:    No test indicated, given lack of symptoms and the patient's reported lack of exposure.    Diagnosis:    ICD-10-CM    1. Chest pain, unspecified type  R07.9    2. Essential hypertension  I10        Discharge Medications:  Discharge Medication List as of 8/14/2021  8:32 AM      START taking these medications    Details   !! amLODIPine (NORVASC) 10 MG tablet Take 1 tablet (10 mg) by mouth daily, Disp-30 tablet, R-0, Local Print      omeprazole (PRILOSEC OTC) 20 MG EC tablet Take 1 tablet (20 mg) by mouth daily for 14 days, Disp-14 tablet, R-0, Local Print       !! - Potential duplicate medications found. Please discuss with provider.          Scribe Disclosure:  I, Dre Baker, am serving as a scribe at 5:50 AM on 8/14/2021 to document services personally performed by Angel Barrera MD based on my observations and the provider's statements to me.              Angel Barrera MD  08/15/21 9928

## 2021-08-14 NOTE — ED TRIAGE NOTES
Mid sternal chest pain that began about 24 hours pta. States pain has worsened. Also reports some SOB. Took tylenol for pain with no relief. Used cocaine last on Thursday night/friday morning, smokes cigarettes daily.

## 2021-08-14 NOTE — DISCHARGE INSTRUCTIONS
Your chest discomfort may be caused by reflux, gastritis or ulcer.  I will have you try taking Prilosec daily for the next 2 weeks.  Eat a bland diet.  Avoid alcohol, spicy food, chocolate, citrus, tomatoes and caffeine.    Call your doctor on Monday to schedule follow-up.  You have risk factors for heart disease, and may need an outpatient stress test.    Be sure to come back to the emergency department for more episodes of chest pain, especially if you are having shortness of breath with it, or if the symptoms come when you are exercising/active.

## 2022-12-07 ENCOUNTER — DOCUMENTATION ONLY (OUTPATIENT)
Dept: FAMILY MEDICINE | Facility: CLINIC | Age: 67
End: 2022-12-07

## 2022-12-07 NOTE — PROGRESS NOTES
Type of Form Received: Housing form    Form Received (Date) 12/7/22   Form Filled out Yes 12/12/22   Placed in provider folder Yes

## 2022-12-09 ENCOUNTER — OFFICE VISIT (OUTPATIENT)
Dept: FAMILY MEDICINE | Facility: CLINIC | Age: 67
End: 2022-12-09
Payer: COMMERCIAL

## 2022-12-09 VITALS
DIASTOLIC BLOOD PRESSURE: 123 MMHG | SYSTOLIC BLOOD PRESSURE: 187 MMHG | OXYGEN SATURATION: 96 % | WEIGHT: 179.8 LBS | HEIGHT: 68 IN | BODY MASS INDEX: 27.25 KG/M2 | HEART RATE: 96 BPM

## 2022-12-09 DIAGNOSIS — M54.50 LOW BACK PAIN OF OVER 3 MONTHS DURATION: ICD-10-CM

## 2022-12-09 DIAGNOSIS — G47.00 INSOMNIA, UNSPECIFIED TYPE: ICD-10-CM

## 2022-12-09 DIAGNOSIS — R39.9 LOWER URINARY TRACT SYMPTOMS (LUTS): ICD-10-CM

## 2022-12-09 DIAGNOSIS — I10 BENIGN ESSENTIAL HYPERTENSION: Primary | ICD-10-CM

## 2022-12-09 PROCEDURE — 99204 OFFICE O/P NEW MOD 45 MIN: CPT | Mod: 25 | Performed by: FAMILY MEDICINE

## 2022-12-09 PROCEDURE — 90471 IMMUNIZATION ADMIN: CPT | Performed by: FAMILY MEDICINE

## 2022-12-09 PROCEDURE — 90662 IIV NO PRSV INCREASED AG IM: CPT | Performed by: FAMILY MEDICINE

## 2022-12-09 RX ORDER — AMLODIPINE BESYLATE 10 MG/1
10 TABLET ORAL DAILY
Qty: 90 TABLET | Refills: 3 | Status: SHIPPED | OUTPATIENT
Start: 2022-12-09

## 2022-12-09 RX ORDER — TAMSULOSIN HYDROCHLORIDE 0.4 MG/1
0.4 CAPSULE ORAL DAILY
Qty: 90 CAPSULE | Refills: 3 | Status: SHIPPED | OUTPATIENT
Start: 2022-12-09

## 2022-12-09 RX ORDER — ACETAMINOPHEN 500 MG
500 TABLET ORAL EVERY 6 HOURS PRN
Qty: 120 TABLET | Refills: 3 | Status: SHIPPED | OUTPATIENT
Start: 2022-12-09

## 2022-12-09 RX ORDER — METOPROLOL SUCCINATE 25 MG/1
25 TABLET, EXTENDED RELEASE ORAL DAILY
Qty: 90 TABLET | Refills: 3 | Status: SHIPPED | OUTPATIENT
Start: 2022-12-09 | End: 2023-01-06

## 2022-12-09 RX ORDER — TRAZODONE HYDROCHLORIDE 50 MG/1
50 TABLET, FILM COATED ORAL AT BEDTIME
Qty: 30 TABLET | Refills: 1 | Status: SHIPPED | OUTPATIENT
Start: 2022-12-09

## 2022-12-09 RX ORDER — GABAPENTIN 300 MG/1
300 CAPSULE ORAL 3 TIMES DAILY
Qty: 90 CAPSULE | Refills: 3 | Status: SHIPPED | OUTPATIENT
Start: 2022-12-09

## 2022-12-09 NOTE — PROGRESS NOTES
"    Assessment & Plan     Benign essential hypertension  Pt defers my offer for evaluation today of severe BP elevation, no neuro/cardio or eye sx on ROS, he'll call if occur, do home BP, take nvasc plus beta (not on anything now), see me in a mo  - amLODIPine (NORVASC) 10 MG tablet; Take 1 tablet (10 mg) by mouth daily  - metoprolol succinate ER (TOPROL XL) 25 MG 24 hr tablet; Take 1 tablet (25 mg) by mouth daily  - FLU VACCINE HIGH DOSE PRESERVATIVE FREE, AGE =>65 YR  - Home Blood Pressure Monitor Order for DME - ONLY FOR DME    Lower urinary tract symptoms (LUTS)  Per pt on this, helps/tolerated, might slightly help BP too  - tamsulosin (FLOMAX) 0.4 MG capsule; Take 1 capsule (0.4 mg) by mouth daily    Low back pain of over 3 months duration  Continue  - gabapentin (NEURONTIN) 300 MG capsule; Take 1 capsule (300 mg) by mouth 3 times daily  - acetaminophen (TYLENOL) 500 MG tablet; Take 1 tablet (500 mg) by mouth every 6 hours as needed for mild pain    Insomnia, unspecified type  Continue  - traZODone (DESYREL) 50 MG tablet; Take 1 tablet (50 mg) by mouth At Bedtime Prn insomnia    Review of external notes as documented elsewhere in note  42 minutes spent on the date of the encounter doing chart review, history and exam, documentation and further activities per the note, paperwork reviewed together and done in visit for housing    Nicotine/Tobacco Cessation:  He reports that he has been smoking cigarettes. He has a 15.00 pack-year smoking history. He has never used smokeless tobacco.  We agree to discuss smoking next month    BMI:   Estimated body mass index is 27.34 kg/m  as calculated from the following:    Height as of this encounter: 1.727 m (5' 8\").    Weight as of this encounter: 81.6 kg (179 lb 12.8 oz).       Return in about 1 month (around 1/9/2023).    Ignacio Perez MD  [facility]    Silvia Hung is a 67 year old, presenting for the following health " issues:  Back Pain (Pt is experiencing back pain) and Medication Refill (Pt needs medication refill for enlarged prostate and amlodipine/)      HPI   I have not seen him for some years, 2011 last visit I see  Per pt, needs forms done for housing, done in visit, xerox sent to scan see those, he takes original  Declines labs, I offer cbc cmet tsh lipids psa  He'd like to defer shots (covid, prev20, shingrix) other than flu  Per record due for colonoscopy 2024  I did review interal care ev records at length, per pt no recent substance us/abuse, did go through rehab for  Uses gabapentin for back pain, well tolerated and helpful, chronic pain, prn tylenol helps too  Uses trazodone for insomnia, helpful and tolerated  Uses flomax for bph sx, helpful and tolerated  BP: severe elevation; per pt out of meds, only takes nvasc, he does not want any further w/u now beyond refills, discussed could do evaulation today he has no neuro/cardio sx on ROS  Main c/o today is getting paperwork done  Many ER visits noted  He does agree to one mo f/u, do home BP in meantime, will re offer labs then  Past Medical History:   Diagnosis Date     Back pain      Depression      Depressive disorder      DJD (degenerative joint disease)      Hypertension      Polysubstance abuse (H)      TB (tuberculosis) 2009    hx TB, states full regimine of medication taken in 2009 and xray in march 2018 looked good     TB lung, latent      Past Surgical History:   Procedure Laterality Date     BRONCHOSCOPY       COLONOSCOPY  12-5-12    Repeat Colonoscopy in 5 yrs.     HAND SURGERY      right palm     MANDIBLE SURGERY  10/2004     XR KNEE SURGERY JUANCHO RIGHT       Current Outpatient Medications   Medication     acetaminophen (TYLENOL) 500 MG tablet     albuterol (PROAIR HFA) 108 (90 Base) MCG/ACT inhaler     albuterol (PROAIR HFA/PROVENTIL HFA/VENTOLIN HFA) 108 (90 Base) MCG/ACT inhaler     amLODIPine (NORVASC) 10 MG tablet     amLODIPine (NORVASC) 5 MG tablet  "    benzonatate (TESSALON) 200 MG capsule     cefuroxime (CEFTIN) 500 MG tablet     doxycycline hyclate (VIBRAMYCIN) 100 MG capsule     gabapentin (NEURONTIN) 300 MG capsule     methocarbamol (ROBAXIN) 750 MG tablet     methylPREDNISolone (MEDROL DOSEPAK) 4 MG tablet therapy pack     metoprolol succinate ER (TOPROL XL) 25 MG 24 hr tablet     tamsulosin (FLOMAX) 0.4 MG capsule     traZODone (DESYREL) 50 MG tablet     lisinopril (PRINIVIL/ZESTRIL) 10 MG tablet     nitroglycerin (NITROSTAT) 0.4 MG SL tablet     No current facility-administered medications for this visit.     Allergies   Allergen Reactions     Lisinopril Shortness Of Breath     Hydrocodone-Acetaminophen Itching     Morphine Itching     Family History   Problem Relation Age of Onset     Hypertension Mother      Cerebrovascular Disease Brother      Social History     Socioeconomic History     Marital status: Single     Spouse name: Not on file     Number of children: Not on file     Years of education: Not on file     Highest education level: Not on file   Occupational History     Not on file   Tobacco Use     Smoking status: Some Days     Packs/day: 1.00     Years: 15.00     Pack years: 15.00     Types: Cigarettes     Smokeless tobacco: Never   Substance and Sexual Activity     Alcohol use: Yes     Alcohol/week: 0.0 standard drinks     Drug use: Yes     Types: Cocaine, \"Crack\" cocaine, Marijuana     Comment: last used monday-      Sexual activity: Yes     Partners: Female     Comment: MENOPAUSE   Other Topics Concern     Parent/sibling w/ CABG, MI or angioplasty before 65F 55M? No   Social History Narrative     Not on file     Social Determinants of Health     Financial Resource Strain: Not on file   Food Insecurity: Not on file   Transportation Needs: Not on file   Physical Activity: Not on file   Stress: Not on file   Social Connections: Not on file   Intimate Partner Violence: Not on file   Housing Stability: Not on file           Review of Systems " "        Objective    BP (!) 187/123 (BP Location: Right arm, Patient Position: Sitting, Cuff Size: Adult Regular)   Pulse 96   Ht 1.727 m (5' 8\")   Wt 81.6 kg (179 lb 12.8 oz)   SpO2 96%   BMI 27.34 kg/m    Body mass index is 27.34 kg/m .  Physical Exam   GENERAL: healthy, alert and no distress  NECK: no adenopathy, no asymmetry, masses, or scars and thyroid normal to palpation  RESP: lungs clear to auscultation - no rales, rhonchi or wheezes  CV: regular rate and rhythm, normal S1 S2, no S3 or S4, no murmur, click or rub, no peripheral edema and peripheral pulses strong  ABDOMEN: soft, nontender, no hepatosplenomegaly, no masses and bowel sounds normal  MS: no gross musculoskeletal defects noted, no edema              "

## 2022-12-09 NOTE — PROGRESS NOTES
Triple Blood Pressure Check    Abhilash Gonzalez presents in clinic today for an appointment with Dr. Perez. The patient was greeted and escorted back to the room without incident. The patient's arm was elevated to heart level and they were instructed to uncross their legs. A triple blood pressure AHA was preformed wherein the blood pressure machine took Abhilash Gonzalez's blood pressure over the course of ten minutes. The following were the individual blood pressures that were observed at today's visit:    (1) 190/124    (2) 178/113    (3) 193/133    The average blood pressure from today's individual readings were 187/123. The results of today's visit were communicated to the ordering provider.    RADHA Vásquez  8:18 AM 12/9/2022   Discitis of lumbar region

## 2022-12-09 NOTE — NURSING NOTE
Abhilash Gonzalez is a 67 year old male that presents in clinic today for the following:     Chief Complaint   Patient presents with     Back Pain     Pt is experiencing back pain     Medication Refill     Pt needs medication refill for enlarged prostate        The patient's allergies and medications were reviewed. The patient's vitals were obtained without incident. The patient does not have any other questions for the provider.     RADHA Vásquez at 7:08 AM on 12/9/2022.  Primary Care Clinic: [phone]

## 2022-12-20 ENCOUNTER — DOCUMENTATION ONLY (OUTPATIENT)
Dept: FAMILY MEDICINE | Facility: CLINIC | Age: 67
End: 2022-12-20

## 2022-12-20 NOTE — PROGRESS NOTES
Type of Form Received: [facility] form    Form Received (Date) 12/20/22   Form Filled out Yes 12/27/22   Placed in provider folder Yes

## 2022-12-21 ENCOUNTER — DOCUMENTATION ONLY (OUTPATIENT)
Dept: FAMILY MEDICINE | Facility: CLINIC | Age: 67
End: 2022-12-21
Payer: COMMERCIAL

## 2022-12-21 NOTE — PROGRESS NOTES
Type of Form Received: cane order    Form Received (Date) 12/21/22   Form Filled out Yes 1/17/23   Placed in provider folder Yes

## 2023-01-04 ENCOUNTER — APPOINTMENT (OUTPATIENT)
Dept: GENERAL RADIOLOGY | Facility: CLINIC | Age: 68
End: 2023-01-04
Attending: EMERGENCY MEDICINE
Payer: COMMERCIAL

## 2023-01-04 ENCOUNTER — HOSPITAL ENCOUNTER (EMERGENCY)
Facility: CLINIC | Age: 68
Discharge: HOME | End: 2023-01-04
Attending: EMERGENCY MEDICINE | Admitting: EMERGENCY MEDICINE
Payer: COMMERCIAL

## 2023-01-04 ENCOUNTER — APPOINTMENT (OUTPATIENT)
Dept: CT IMAGING | Facility: CLINIC | Age: 68
End: 2023-01-04
Attending: EMERGENCY MEDICINE
Payer: COMMERCIAL

## 2023-01-04 VITALS
TEMPERATURE: 97.9 F | HEIGHT: 68 IN | DIASTOLIC BLOOD PRESSURE: 115 MMHG | WEIGHT: 177.5 LBS | RESPIRATION RATE: 17 BRPM | BODY MASS INDEX: 26.9 KG/M2 | SYSTOLIC BLOOD PRESSURE: 159 MMHG | HEART RATE: 98 BPM | OXYGEN SATURATION: 98 %

## 2023-01-04 DIAGNOSIS — R07.89 OTHER CHEST PAIN: ICD-10-CM

## 2023-01-04 DIAGNOSIS — S49.91XA INJURY OF RIGHT SHOULDER, INITIAL ENCOUNTER: ICD-10-CM

## 2023-01-04 LAB
ALBUMIN SERPL-MCNC: 3.6 G/DL (ref 3.4–5)
ALP SERPL-CCNC: 80 U/L (ref 40–150)
ALT SERPL W P-5'-P-CCNC: 59 U/L (ref 0–70)
ANION GAP SERPL CALCULATED.3IONS-SCNC: 9 MMOL/L (ref 3–14)
AST SERPL W P-5'-P-CCNC: 17 U/L (ref 0–45)
BASOPHILS # BLD AUTO: 0 10E3/UL (ref 0–0.2)
BASOPHILS NFR BLD AUTO: 0 %
BILIRUB SERPL-MCNC: 1.3 MG/DL (ref 0.2–1.3)
BUN SERPL-MCNC: 20 MG/DL (ref 7–30)
CALCIUM SERPL-MCNC: 9.2 MG/DL (ref 8.5–10.1)
CHLORIDE BLD-SCNC: 103 MMOL/L (ref 94–109)
CO2 SERPL-SCNC: 29 MMOL/L (ref 20–32)
CREAT SERPL-MCNC: 1.72 MG/DL (ref 0.66–1.25)
D DIMER PPP FEU-MCNC: 0.55 UG/ML FEU (ref 0–0.5)
EOSINOPHIL # BLD AUTO: 0.1 10E3/UL (ref 0–0.7)
EOSINOPHIL NFR BLD AUTO: 1 %
ERYTHROCYTE [DISTWIDTH] IN BLOOD BY AUTOMATED COUNT: 13.4 % (ref 10–15)
GFR SERPL CREATININE-BSD FRML MDRD: 43 ML/MIN/1.73M2
GLUCOSE BLD-MCNC: 155 MG/DL (ref 70–99)
HCT VFR BLD AUTO: 50.6 % (ref 40–53)
HGB BLD-MCNC: 17.7 G/DL (ref 13.3–17.7)
IMM GRANULOCYTES # BLD: 0 10E3/UL
IMM GRANULOCYTES NFR BLD: 0 %
LACTATE SERPL-SCNC: 1.6 MMOL/L (ref 0.7–2)
LIPASE SERPL-CCNC: 121 U/L (ref 73–393)
LYMPHOCYTES # BLD AUTO: 1.1 10E3/UL (ref 0.8–5.3)
LYMPHOCYTES NFR BLD AUTO: 15 %
MAGNESIUM SERPL-MCNC: 2.4 MG/DL (ref 1.6–2.3)
MCH RBC QN AUTO: 31.8 PG (ref 26.5–33)
MCHC RBC AUTO-ENTMCNC: 35 G/DL (ref 31.5–36.5)
MCV RBC AUTO: 91 FL (ref 78–100)
MONOCYTES # BLD AUTO: 0.7 10E3/UL (ref 0–1.3)
MONOCYTES NFR BLD AUTO: 9 %
NEUTROPHILS # BLD AUTO: 5.5 10E3/UL (ref 1.6–8.3)
NEUTROPHILS NFR BLD AUTO: 75 %
NRBC # BLD AUTO: 0 10E3/UL
NRBC BLD AUTO-RTO: 0 /100
PLATELET # BLD AUTO: 245 10E3/UL (ref 150–450)
POTASSIUM BLD-SCNC: 3.3 MMOL/L (ref 3.4–5.3)
PROT SERPL-MCNC: 8 G/DL (ref 6.8–8.8)
RBC # BLD AUTO: 5.56 10E6/UL (ref 4.4–5.9)
SODIUM SERPL-SCNC: 141 MMOL/L (ref 133–144)
TROPONIN I SERPL HS-MCNC: 18 NG/L
TSH SERPL DL<=0.005 MIU/L-ACNC: 0.93 MU/L (ref 0.4–4)
WBC # BLD AUTO: 7.4 10E3/UL (ref 4–11)

## 2023-01-04 PROCEDURE — 93010 ELECTROCARDIOGRAM REPORT: CPT | Performed by: EMERGENCY MEDICINE

## 2023-01-04 PROCEDURE — 73030 X-RAY EXAM OF SHOULDER: CPT | Mod: RT

## 2023-01-04 PROCEDURE — 99285 EMERGENCY DEPT VISIT HI MDM: CPT | Mod: 25 | Performed by: EMERGENCY MEDICINE

## 2023-01-04 PROCEDURE — 80053 COMPREHEN METABOLIC PANEL: CPT | Performed by: EMERGENCY MEDICINE

## 2023-01-04 PROCEDURE — 84443 ASSAY THYROID STIM HORMONE: CPT | Performed by: EMERGENCY MEDICINE

## 2023-01-04 PROCEDURE — 71275 CT ANGIOGRAPHY CHEST: CPT

## 2023-01-04 PROCEDURE — 93005 ELECTROCARDIOGRAM TRACING: CPT | Performed by: EMERGENCY MEDICINE

## 2023-01-04 PROCEDURE — 83735 ASSAY OF MAGNESIUM: CPT | Performed by: EMERGENCY MEDICINE

## 2023-01-04 PROCEDURE — 85025 COMPLETE CBC W/AUTO DIFF WBC: CPT | Performed by: EMERGENCY MEDICINE

## 2023-01-04 PROCEDURE — 250N000013 HC RX MED GY IP 250 OP 250 PS 637: Performed by: EMERGENCY MEDICINE

## 2023-01-04 PROCEDURE — 83690 ASSAY OF LIPASE: CPT | Performed by: EMERGENCY MEDICINE

## 2023-01-04 PROCEDURE — 250N000011 HC RX IP 250 OP 636: Performed by: EMERGENCY MEDICINE

## 2023-01-04 PROCEDURE — 250N000009 HC RX 250: Performed by: EMERGENCY MEDICINE

## 2023-01-04 PROCEDURE — 84484 ASSAY OF TROPONIN QUANT: CPT | Performed by: EMERGENCY MEDICINE

## 2023-01-04 PROCEDURE — 83605 ASSAY OF LACTIC ACID: CPT | Performed by: EMERGENCY MEDICINE

## 2023-01-04 PROCEDURE — 85379 FIBRIN DEGRADATION QUANT: CPT | Performed by: EMERGENCY MEDICINE

## 2023-01-04 PROCEDURE — 36415 COLL VENOUS BLD VENIPUNCTURE: CPT | Performed by: EMERGENCY MEDICINE

## 2023-01-04 PROCEDURE — 71046 X-RAY EXAM CHEST 2 VIEWS: CPT

## 2023-01-04 RX ORDER — IOPAMIDOL 755 MG/ML
100 INJECTION, SOLUTION INTRAVASCULAR ONCE
Status: COMPLETED | OUTPATIENT
Start: 2023-01-04 | End: 2023-01-04

## 2023-01-04 RX ORDER — IBUPROFEN 600 MG/1
600 TABLET, FILM COATED ORAL ONCE
Status: COMPLETED | OUTPATIENT
Start: 2023-01-04 | End: 2023-01-04

## 2023-01-04 RX ORDER — ACETAMINOPHEN 325 MG/1
975 TABLET ORAL ONCE
Status: COMPLETED | OUTPATIENT
Start: 2023-01-04 | End: 2023-01-04

## 2023-01-04 RX ADMIN — IOPAMIDOL 64 ML: 755 INJECTION, SOLUTION INTRAVENOUS at 17:50

## 2023-01-04 RX ADMIN — ACETAMINOPHEN 975 MG: 325 TABLET, FILM COATED ORAL at 16:15

## 2023-01-04 RX ADMIN — SODIUM CHLORIDE 84 ML: 9 INJECTION, SOLUTION INTRAVENOUS at 17:59

## 2023-01-04 RX ADMIN — IBUPROFEN 600 MG: 600 TABLET ORAL at 16:15

## 2023-01-04 ASSESSMENT — ACTIVITIES OF DAILY LIVING (ADL): ADLS_ACUITY_SCORE: 35

## 2023-01-04 NOTE — ED PROVIDER NOTES
ED Provider Note  [facility]      History     Chief Complaint   Patient presents with     Chest Pain     The history is provided by the patient and medical records.     Abhilash Gonzalez is a 67 year old male with a  history of hypertension and recent hospital admission in August 2022 at outside hospital where he had a CT coronary angiogram which was negative and was ruled out for PE. Zio patch was recommended as an outpatient which is unclear if he followed up with the Zio patch. Of note, patient presented to outside ED yesterday chest pain. He was evaluated and discharged. Today, he presents for continued chest pain which is substernal, nonradiating, nonexertional, nonpleuritic and reports having a productive cough x1 week without a fever.     Patient reports 2 hours prior to arrival when leaving the public library that he felt a gradual sense of lightheadedness, fell to the ground without loss of consciousness or head strike, striking his right shoulder on the flat ground after which he had right shoulder pain which he presents as a second complaint today.      Past Medical History:   Diagnosis Date     Back pain      Depression      Depressive disorder      DJD (degenerative joint disease)      Hypertension      Polysubstance abuse (H)      TB (tuberculosis) 2009    hx TB, states full regimine of medication taken in 2009 and xray in march 2018 looked good     TB lung, latent        Past Surgical History:   Procedure Laterality Date     BRONCHOSCOPY       COLONOSCOPY  12-5-12    Repeat Colonoscopy in 5 yrs.     HAND SURGERY      right palm     MANDIBLE SURGERY  10/2004     XR KNEE SURGERY JUANCHO RIGHT         Family History   Problem Relation Age of Onset     Hypertension Mother      Cerebrovascular Disease Brother        Social History     Tobacco Use     Smoking status: Some Days     Packs/day: 1.00     Years: 15.00     Pack years: 15.00     Types: Cigarettes     Smokeless tobacco:  "Never   Substance Use Topics     Alcohol use: Yes     Comment: occasional couple beers     No current facility-administered medications for this encounter.     Current Outpatient Medications   Medication     acetaminophen (TYLENOL) 500 MG tablet     albuterol (PROAIR HFA) 108 (90 Base) MCG/ACT inhaler     albuterol (PROAIR HFA/PROVENTIL HFA/VENTOLIN HFA) 108 (90 Base) MCG/ACT inhaler     amLODIPine (NORVASC) 10 MG tablet     amLODIPine (NORVASC) 5 MG tablet     benzonatate (TESSALON) 200 MG capsule     cefuroxime (CEFTIN) 500 MG tablet     doxycycline hyclate (VIBRAMYCIN) 100 MG capsule     gabapentin (NEURONTIN) 300 MG capsule     lisinopril (PRINIVIL/ZESTRIL) 10 MG tablet     methocarbamol (ROBAXIN) 750 MG tablet     methylPREDNISolone (MEDROL DOSEPAK) 4 MG tablet therapy pack     metoprolol succinate ER (TOPROL XL) 25 MG 24 hr tablet     nitroglycerin (NITROSTAT) 0.4 MG SL tablet     tamsulosin (FLOMAX) 0.4 MG capsule     traZODone (DESYREL) 50 MG tablet        Allergies   Allergen Reactions     Lisinopril Shortness Of Breath     Hydrocodone-Acetaminophen Itching     Morphine Itching        A complete review of systems was performed with pertinent positives and negatives noted in the HPI, and all other systems negative.    Physical Exam   Height: 172.7 cm (5' 8\")  Weight: 80.5 kg (177 lb 8 oz)  Physical Exam  Vitals and nursing note reviewed.   Constitutional:       General: He is not in acute distress.  HENT:      Head: Normocephalic and atraumatic.   Eyes:      Conjunctiva/sclera: Conjunctivae normal.      Pupils: Pupils are equal, round, and reactive to light.   Cardiovascular:      Rate and Rhythm: Regular rhythm. Tachycardia present.      Heart sounds: No murmur heard.    No friction rub. No gallop.   Pulmonary:      Effort: Pulmonary effort is normal. No respiratory distress.      Breath sounds: Wheezing present.   Abdominal:      Palpations: Abdomen is soft.      Tenderness: There is no abdominal " tenderness.   Musculoskeletal:         General: No tenderness.      Cervical back: Normal range of motion and neck supple.      Comments: Tender right anterior shoulder with pain upon range of motion of right shoulder   Skin:     General: Skin is warm.   Neurological:      Mental Status: He is alert and oriented to person, place, and time.      Cranial Nerves: No cranial nerve deficit.   Psychiatric:         Behavior: Behavior normal.         Thought Content: Thought content normal.         Judgment: Judgment normal.         ED Course, Procedures, & Data     ED Course as of 01/04/23 1805   Wed Jan 04, 2023   1724 + dimer, will perform ctpe     Procedures              EKG Interpretation:      Interpreted by Chris De La O MD  Time reviewed:16:30   Symptoms at time of EKG: Chest Pain   Rhythm: Normal sinus   Rate: 110 bpm  Axis: Normal  Ectopy: None  Conduction: QTc 503  ST Segments/ T Waves: No ST-T wave changes and No acute ischemic changes  Q Waves: None  Comparison to prior: Similar morphology to prior EKG on 8/14/2021 in Epic    Clinical Impression: Sinus tachycardia with no ischemia changes    This part of the document was transcribed by Rissa Espinal Scribe.          No results found for any visits on 01/04/23.  Medications - No data to display  Labs Ordered and Resulted from Time of ED Arrival to Time of ED Departure - No data to display  No orders to display          Medical Decision Making  The patient presented with a problem that is an acute complicated injury and a chronic illness severe exacerbation, progression, or side effect of treatment.    The patient's evaluation involved:  review of 3+ prior external note(s) (see separate area of note for details)  ordering and review of 3+ test(s) (see separate area of note for details)  review of 3+ test result(s) ordered prior to this encounter (see separate area of note for details)    The patient's management involved limitations due to social  determinants of health and a decision regarding hospitalization.            Assessment & Plan    Patient is a 67 year-old male who presents to rule out right shoulder fracture more likely contusion. Neurovascularly intact. No laceration. Plan is x-ray right shoulder, oral Tylenol and Motrin for pain. Second problem is persistent chest pain in setting of tachycardia and infectious symptoms. Will rule out pneumonia, pneumothorax, ACS and screen for PE given tachycardia. Plan labs including D-dimer, troponin, EKG and chest x-ray. Reassess for disposition.    I have reviewed the nursing notes. I have reviewed the findings, diagnosis, plan and need for follow up with the patient.    New Prescriptions    No medications on file       Final diagnoses:   None     IParish, am serving as a trained medical scribe to document services personally performed by Chris De La O MD, based on the provider's statements to me.     Chris LOONEY MD, was physically present and have reviewed and verified the accuracy of this note documented by Parish Torres.    Chris De La O MD  [facility] EMERGENCY DEPARTMENT  1/4/2023     Chris De La O MD  01/04/23 1926

## 2023-01-04 NOTE — ED TRIAGE NOTES
Patient presents due to sharp 8/10 medial chest pain. Patient reports cough for 1 week accompanied by medial sharp chest pain. Patient reports productive cough with green sputum. Patient reports going to [facility] yesterday to be checked for chest pain. Patient has also had two episodes of passing out yesterday and today. Patient injured right upper arm and shoulder when passing out earlier today.     Triage Assessment     Row Name 01/04/23 7734       Triage Assessment (Adult)    Airway WDL WDL       Respiratory WDL    Respiratory WDL WDL       Skin Circulation/Temperature WDL    Skin Circulation/Temperature WDL WDL       Cardiac WDL    Cardiac WDL X;chest pain;rhythm    Pulse Rate & Regularity tachycardic    Cardiac Rhythm ST       Chest Pain Assessment    Chest Pain Location midsternal    Character sharp    Precipitating Factors at rest    Associated Signs/Symptoms dizziness;syncope    Chest Pain Intervention cardiac biomarkers drawn;cardiac monitoring continued;12-lead ECG obtained;activity minimized       Peripheral/Neurovascular WDL    Peripheral Neurovascular WDL WDL       Cognitive/Neuro/Behavioral WDL    Cognitive/Neuro/Behavioral WDL WDL

## 2023-01-05 LAB
ATRIAL RATE - MUSE: 110 BPM
DIASTOLIC BLOOD PRESSURE - MUSE: NORMAL MMHG
INTERPRETATION ECG - MUSE: NORMAL
P AXIS - MUSE: 62 DEGREES
PR INTERVAL - MUSE: 142 MS
QRS DURATION - MUSE: 80 MS
QT - MUSE: 372 MS
QTC - MUSE: 503 MS
R AXIS - MUSE: 44 DEGREES
SYSTOLIC BLOOD PRESSURE - MUSE: NORMAL MMHG
T AXIS - MUSE: 81 DEGREES
VENTRICULAR RATE- MUSE: 110 BPM

## 2023-01-05 NOTE — DISCHARGE INSTRUCTIONS
"Please follow-up with your primary doctor and cardiologist for \"left ventricular wall appears mildly thickened\" seen on CT chest.    For shoulder pain, For pain, please take 975-1000mg acetaminophen (tylenol) every 8 hours -- do not take more than 3000mg in a 24 hour period.    Return to ED for any worsening chest pain, difficulty breathing    "

## 2023-01-06 ENCOUNTER — TELEPHONE (OUTPATIENT)
Dept: ORTHOPEDICS | Facility: CLINIC | Age: 68
End: 2023-01-06

## 2023-01-06 ENCOUNTER — OFFICE VISIT (OUTPATIENT)
Dept: FAMILY MEDICINE | Facility: CLINIC | Age: 68
End: 2023-01-06
Payer: COMMERCIAL

## 2023-01-06 VITALS
HEART RATE: 92 BPM | DIASTOLIC BLOOD PRESSURE: 112 MMHG | WEIGHT: 179.5 LBS | OXYGEN SATURATION: 96 % | BODY MASS INDEX: 27.2 KG/M2 | SYSTOLIC BLOOD PRESSURE: 169 MMHG | HEIGHT: 68 IN

## 2023-01-06 DIAGNOSIS — I10 UNCONTROLLED HYPERTENSION: ICD-10-CM

## 2023-01-06 DIAGNOSIS — M25.551 HIP PAIN, RIGHT: ICD-10-CM

## 2023-01-06 DIAGNOSIS — R07.9 CHEST PAIN, UNSPECIFIED TYPE: Primary | ICD-10-CM

## 2023-01-06 DIAGNOSIS — M25.511 ACUTE PAIN OF RIGHT SHOULDER: ICD-10-CM

## 2023-01-06 PROCEDURE — 99214 OFFICE O/P EST MOD 30 MIN: CPT | Performed by: FAMILY MEDICINE

## 2023-01-06 RX ORDER — OXYCODONE HYDROCHLORIDE 5 MG/1
5 TABLET ORAL 3 TIMES DAILY PRN
Qty: 6 TABLET | Refills: 0 | Status: SHIPPED | OUTPATIENT
Start: 2023-01-06 | End: 2023-01-09

## 2023-01-06 RX ORDER — CLONIDINE HYDROCHLORIDE 0.1 MG/1
0.1 TABLET ORAL 2 TIMES DAILY
Qty: 60 TABLET | Refills: 0 | Status: SHIPPED | OUTPATIENT
Start: 2023-01-06

## 2023-01-06 NOTE — TELEPHONE ENCOUNTER
CHARIS w/ sports number for pt's sister to have pt call back and schedule new appt in sports for hip/shoulder pain due to recent fall - appt w/ Dr. Denny in orthopedics on 1/10 was made in error and needs to get rescheduled

## 2023-01-06 NOTE — PROGRESS NOTES
"    Assessment & Plan     Chest pain, unspecified type  Try to do before cardio visit in a week  - NM Lexiscan stress test; Future  - Echocardiogram Complete; Future    Uncontrolled hypertension  Cont nvasc, add clonidine while await cardio visit, discussed could cause fatigue  - NM Lexiscan stress test; Future  - Echocardiogram Complete; Future  - cloNIDine (CATAPRES) 0.1 MG tablet; Take 1 tablet (0.1 mg) by mouth 2 times daily  - Basic metabolic panel  (Ca, Cl, CO2, Creat, Gluc, K, Na, BUN); Future    Acute pain of right shoulder  Short course oxy, discussed no refills, pain interferes w/ fcn/sleep  - Orthopedic  Referral; Future  - oxyCODONE (ROXICODONE) 5 MG tablet; Take 1 tablet (5 mg) by mouth 3 times daily as needed for severe pain (7-10)    Hip pain, right    - Orthopedic  Referral; Future    Review of external notes as documented elsewhere in noteER notes/tests, extensive  38 minutes spent on the date of the encounter doing chart review, history and exam, documentation and further activities per the note    BMI:   Estimated body mass index is 27.29 kg/m  as calculated from the following:    Height as of this encounter: 1.727 m (5' 8\").    Weight as of this encounter: 81.4 kg (179 lb 8 oz).     Ignacio Perez MD  [facility] [place]    Silvia Hung is a 67 year old, presenting for the following health issues:  Follow Up (Pt here for one month follow up )      HPI Here for a few things  One, fell on ice 2 days ago, R hip/shoulder pain since, cannot use nsaids w/ renal fcn, tylenol no help, pain limits sleep/comfort/fcn, discussed his documented history substance use, he denies recent use, will ok six oxycodone (per pt tolerates despite allergy list, rvwd), and see Sp Med, discussed I won't refill narcotic.  ER notes/imaging rvwd  Two, chest pain, not exertional but w/ uncontrolled htn, age/sex, risk of cad, 2019 neg cath, sees cardioloogy in a week, no " "associated dypsnea or lightheaded or nausea    Is on nvasc ten mg/day, never took beta blocker, bp persistently too high    ER notes for chest pain rvwd; many tests done, reviewed during this visit. No recent echo or stress test    Past Medical History:   Diagnosis Date     Back pain      Depression      Depressive disorder      DJD (degenerative joint disease)      Hypertension      Polysubstance abuse (H)      TB (tuberculosis) 2009    hx TB, states full regimine of medication taken in 2009 and xray in march 2018 looked good     TB lung, latent      Past Surgical History:   Procedure Laterality Date     BRONCHOSCOPY       COLONOSCOPY  12-5-12    Repeat Colonoscopy in 5 yrs.     HAND SURGERY      right palm     MANDIBLE SURGERY  10/2004     XR KNEE SURGERY JUANCHO RIGHT       Current Outpatient Medications   Medication     cloNIDine (CATAPRES) 0.1 MG tablet     oxyCODONE (ROXICODONE) 5 MG tablet     acetaminophen (TYLENOL) 500 MG tablet     albuterol (PROAIR HFA) 108 (90 Base) MCG/ACT inhaler     albuterol (PROAIR HFA/PROVENTIL HFA/VENTOLIN HFA) 108 (90 Base) MCG/ACT inhaler     amLODIPine (NORVASC) 10 MG tablet     benzonatate (TESSALON) 200 MG capsule     cefuroxime (CEFTIN) 500 MG tablet     doxycycline hyclate (VIBRAMYCIN) 100 MG capsule     gabapentin (NEURONTIN) 300 MG capsule     lisinopril (PRINIVIL/ZESTRIL) 10 MG tablet     methocarbamol (ROBAXIN) 750 MG tablet     methylPREDNISolone (MEDROL DOSEPAK) 4 MG tablet therapy pack     nitroglycerin (NITROSTAT) 0.4 MG SL tablet     tamsulosin (FLOMAX) 0.4 MG capsule     traZODone (DESYREL) 50 MG tablet     No current facility-administered medications for this visit.     Allergies   Allergen Reactions     Lisinopril Shortness Of Breath     Hydrocodone-Acetaminophen Itching     Morphine Itching           Review of Systems         Objective    BP (!) 169/112   Pulse 92   Ht 1.727 m (5' 8\")   Wt 81.4 kg (179 lb 8 oz)   SpO2 96%   BMI 27.29 kg/m    Body mass index " is 27.29 kg/m .  Physical Exam   GENERAL: healthy, alert and no distress  NECK: no adenopathy, no asymmetry, masses, or scars and thyroid normal to palpation  RESP: lungs clear to auscultation - no rales, rhonchi or wheezes  CV: regular rate and rhythm, normal S1 S2, no S3 or S4, no murmur, click or rub, no peripheral edema and peripheral pulses strong  ABDOMEN: soft, nontender, no hepatosplenomegaly, no masses and bowel sounds normal  MS: no gross musculoskeletal defects noted, no edema; R hip nontender, mild pain w/ ROM, slight limp from pain (no fx on ER image), R shoulder nontender, 1/3 ROM all directions before limited by pain, R arm full strength

## 2023-01-06 NOTE — NURSING NOTE
Abhilash Gonzalez is a 67 year old male that presents in clinic today for the following:     Chief Complaint   Patient presents with     Follow Up     Pt here for one month follow up        The patient's allergies and medications were reviewed. The patient's vitals were obtained without incident. The patient does not have any other questions for the provider.     RADHA Vásquez at 8:10 AM on 1/6/2023.  Primary Care Clinic: [phone]

## 2023-01-10 ENCOUNTER — PRE VISIT (OUTPATIENT)
Dept: ORTHOPEDICS | Facility: CLINIC | Age: 68
End: 2023-01-10

## 2023-01-17 ENCOUNTER — MEDICAL CORRESPONDENCE (OUTPATIENT)
Dept: HEALTH INFORMATION MANAGEMENT | Facility: CLINIC | Age: 68
End: 2023-01-17
Payer: COMMERCIAL

## 2023-03-24 ENCOUNTER — APPOINTMENT (OUTPATIENT)
Dept: GENERAL RADIOLOGY | Facility: CLINIC | Age: 68
End: 2023-03-24
Attending: EMERGENCY MEDICINE
Payer: COMMERCIAL

## 2023-03-24 ENCOUNTER — HOSPITAL ENCOUNTER (EMERGENCY)
Facility: CLINIC | Age: 68
Discharge: HOME | End: 2023-03-24
Attending: EMERGENCY MEDICINE | Admitting: EMERGENCY MEDICINE
Payer: COMMERCIAL

## 2023-03-24 VITALS
TEMPERATURE: 97.5 F | RESPIRATION RATE: 20 BRPM | DIASTOLIC BLOOD PRESSURE: 106 MMHG | HEART RATE: 88 BPM | OXYGEN SATURATION: 96 % | SYSTOLIC BLOOD PRESSURE: 162 MMHG

## 2023-03-24 DIAGNOSIS — R07.9 CHEST PAIN, UNSPECIFIED TYPE: ICD-10-CM

## 2023-03-24 LAB
ANION GAP SERPL CALCULATED.3IONS-SCNC: 13 MMOL/L (ref 7–15)
ATRIAL RATE - MUSE: 88 BPM
BASOPHILS # BLD AUTO: 0 10E3/UL (ref 0–0.2)
BASOPHILS NFR BLD AUTO: 1 %
BUN SERPL-MCNC: 18.8 MG/DL (ref 8–23)
CALCIUM SERPL-MCNC: 9.9 MG/DL (ref 8.8–10.2)
CHLORIDE SERPL-SCNC: 105 MMOL/L (ref 98–107)
CREAT SERPL-MCNC: 1.34 MG/DL (ref 0.67–1.17)
DEPRECATED HCO3 PLAS-SCNC: 22 MMOL/L (ref 22–29)
DIASTOLIC BLOOD PRESSURE - MUSE: NORMAL MMHG
EOSINOPHIL # BLD AUTO: 0.1 10E3/UL (ref 0–0.7)
EOSINOPHIL NFR BLD AUTO: 1 %
ERYTHROCYTE [DISTWIDTH] IN BLOOD BY AUTOMATED COUNT: 13 % (ref 10–15)
GFR SERPL CREATININE-BSD FRML MDRD: 58 ML/MIN/1.73M2
GLUCOSE SERPL-MCNC: 73 MG/DL (ref 70–99)
HCT VFR BLD AUTO: 48 % (ref 40–53)
HGB BLD-MCNC: 16.1 G/DL (ref 13.3–17.7)
IMM GRANULOCYTES # BLD: 0 10E3/UL
IMM GRANULOCYTES NFR BLD: 0 %
INTERPRETATION ECG - MUSE: NORMAL
LYMPHOCYTES # BLD AUTO: 1.1 10E3/UL (ref 0.8–5.3)
LYMPHOCYTES NFR BLD AUTO: 22 %
MCH RBC QN AUTO: 31.9 PG (ref 26.5–33)
MCHC RBC AUTO-ENTMCNC: 33.5 G/DL (ref 31.5–36.5)
MCV RBC AUTO: 95 FL (ref 78–100)
MONOCYTES # BLD AUTO: 0.4 10E3/UL (ref 0–1.3)
MONOCYTES NFR BLD AUTO: 7 %
NEUTROPHILS # BLD AUTO: 3.6 10E3/UL (ref 1.6–8.3)
NEUTROPHILS NFR BLD AUTO: 69 %
NRBC # BLD AUTO: 0 10E3/UL
NRBC BLD AUTO-RTO: 0 /100
P AXIS - MUSE: 53 DEGREES
PLATELET # BLD AUTO: 148 10E3/UL (ref 150–450)
POTASSIUM SERPL-SCNC: 4.4 MMOL/L (ref 3.4–5.3)
PR INTERVAL - MUSE: 158 MS
QRS DURATION - MUSE: 86 MS
QT - MUSE: 416 MS
QTC - MUSE: 503 MS
R AXIS - MUSE: 25 DEGREES
RBC # BLD AUTO: 5.04 10E6/UL (ref 4.4–5.9)
SODIUM SERPL-SCNC: 140 MMOL/L (ref 136–145)
SYSTOLIC BLOOD PRESSURE - MUSE: NORMAL MMHG
T AXIS - MUSE: 80 DEGREES
TROPONIN T SERPL HS-MCNC: 16 NG/L
VENTRICULAR RATE- MUSE: 88 BPM
WBC # BLD AUTO: 5.1 10E3/UL (ref 4–11)

## 2023-03-24 PROCEDURE — 99285 EMERGENCY DEPT VISIT HI MDM: CPT | Mod: 25 | Performed by: EMERGENCY MEDICINE

## 2023-03-24 PROCEDURE — 36415 COLL VENOUS BLD VENIPUNCTURE: CPT | Performed by: EMERGENCY MEDICINE

## 2023-03-24 PROCEDURE — 84484 ASSAY OF TROPONIN QUANT: CPT | Performed by: EMERGENCY MEDICINE

## 2023-03-24 PROCEDURE — 80048 BASIC METABOLIC PNL TOTAL CA: CPT | Performed by: EMERGENCY MEDICINE

## 2023-03-24 PROCEDURE — 71046 X-RAY EXAM CHEST 2 VIEWS: CPT | Mod: 26 | Performed by: RADIOLOGY

## 2023-03-24 PROCEDURE — 93005 ELECTROCARDIOGRAM TRACING: CPT | Performed by: EMERGENCY MEDICINE

## 2023-03-24 PROCEDURE — 250N000013 HC RX MED GY IP 250 OP 250 PS 637: Performed by: EMERGENCY MEDICINE

## 2023-03-24 PROCEDURE — 93308 TTE F-UP OR LMTD: CPT | Mod: 26 | Performed by: EMERGENCY MEDICINE

## 2023-03-24 PROCEDURE — 93308 TTE F-UP OR LMTD: CPT | Performed by: EMERGENCY MEDICINE

## 2023-03-24 PROCEDURE — 93010 ELECTROCARDIOGRAM REPORT: CPT | Performed by: EMERGENCY MEDICINE

## 2023-03-24 PROCEDURE — 85025 COMPLETE CBC W/AUTO DIFF WBC: CPT | Performed by: EMERGENCY MEDICINE

## 2023-03-24 PROCEDURE — 99284 EMERGENCY DEPT VISIT MOD MDM: CPT | Mod: 25 | Performed by: EMERGENCY MEDICINE

## 2023-03-24 PROCEDURE — 71046 X-RAY EXAM CHEST 2 VIEWS: CPT

## 2023-03-24 RX ORDER — ACETAMINOPHEN 500 MG
1000 TABLET ORAL ONCE
Status: COMPLETED | OUTPATIENT
Start: 2023-03-24 | End: 2023-03-24

## 2023-03-24 RX ADMIN — ACETAMINOPHEN 1000 MG: 500 TABLET ORAL at 04:28

## 2023-03-24 ASSESSMENT — ACTIVITIES OF DAILY LIVING (ADL): ADLS_ACUITY_SCORE: 35

## 2023-03-24 NOTE — ED PROVIDER NOTES
[place] EMERGENCY DEPARTMENT ([facility])  March 24, 2023     History     Chief Complaint   Patient presents with     Chest Pain     HPI     Abhilash Gonzalez is a 67 year old male with a past medical history significant for HTN, tobacco use, benign prostatic hyperplasia who presents to the Emergency Department for evaluation of chest pain. Patient reports chest pain began acutely while shopping today at approximately 9 PM in a grocery store.  Somewhat worse with motion.  No change with breathing or exertion.    Reports has had similar chest pain in the past but not for some time.    No associated fevers or chills no recent cough no URI symptoms.  No abdominal pain nausea vomiting or diarrhea.  No recent falls or trauma.    Patient has a history of cocaine use but denies any recent use    Per [facility] Everywhere the patient was recently seen at [facility] on 3/8/23 for reproducible midline chest pain. Trops were negative and EKG w/NSR. Chest x-ray revealed streaky left basilar opacities, likely atelectasis. Otherwise clear lungs. Costochondritis was considered likely and he was discharged home.    XR CHEST 2 VIEWS PA + LAT* (03/08/2023 4:27 AM CST)  Impression:  Streaky left basilar opacities, likely atelectasis. Otherwise clear lungs.      Past Medical History  Past Medical History:   Diagnosis Date     Back pain      Depression      Depressive disorder      DJD (degenerative joint disease)      Hypertension      Polysubstance abuse (H)      TB (tuberculosis) 2009    hx TB, states full regimine of medication taken in 2009 and xray in march 2018 looked good     TB lung, latent      Past Surgical History:   Procedure Laterality Date     BRONCHOSCOPY       COLONOSCOPY  12-5-12    Repeat Colonoscopy in 5 yrs.     HAND SURGERY      right palm     MANDIBLE SURGERY  10/2004     XR KNEE SURGERY JUANCHO RIGHT       acetaminophen (TYLENOL) 500 MG tablet  albuterol (PROAIR HFA) 108 (90 Base) MCG/ACT inhaler  albuterol  "(PROAIR HFA/PROVENTIL HFA/VENTOLIN HFA) 108 (90 Base) MCG/ACT inhaler  amLODIPine (NORVASC) 10 MG tablet  benzonatate (TESSALON) 200 MG capsule  cefuroxime (CEFTIN) 500 MG tablet  cloNIDine (CATAPRES) 0.1 MG tablet  doxycycline hyclate (VIBRAMYCIN) 100 MG capsule  gabapentin (NEURONTIN) 300 MG capsule  lisinopril (PRINIVIL/ZESTRIL) 10 MG tablet  methocarbamol (ROBAXIN) 750 MG tablet  methylPREDNISolone (MEDROL DOSEPAK) 4 MG tablet therapy pack  nitroglycerin (NITROSTAT) 0.4 MG SL tablet  tamsulosin (FLOMAX) 0.4 MG capsule  traZODone (DESYREL) 50 MG tablet      Allergies   Allergen Reactions     Lisinopril Shortness Of Breath     Hydrocodone-Acetaminophen Itching     Morphine Itching     Family History  Family History   Problem Relation Age of Onset     Hypertension Mother      Cerebrovascular Disease Brother      Social History   Social History     Tobacco Use     Smoking status: Some Days     Packs/day: 1.00     Years: 15.00     Pack years: 15.00     Types: Cigarettes     Smokeless tobacco: Never   Substance Use Topics     Alcohol use: Yes     Comment: occasional couple beers     Drug use: Not Currently     Types: Cocaine, \"Crack\" cocaine, Marijuana      Past medical history, past surgical history, medications, allergies, family history, and social history were reviewed with the patient. No additional pertinent items.      A medically appropriate review of systems was performed with pertinent positives and negatives noted in the HPI, and all other systems negative.    Physical Exam   BP: (!) 162/106  Pulse: 88  Temp: 97.5  F (36.4  C)  Resp: 20  SpO2: 96 %  Physical Exam  GEN: Well appearing, non toxic, cooperative and conversant.   HEENT: The head is normocephalic and atraumatic. Pupils are equal round and reactive to light. Extraocular motions are intact. There is no facial swelling. The neck is nontender and supple.   CV: Regular rate and rhythm without murmurs rubs or gallops. 2+ radial pulses " bilaterally.  PULM: Clear to auscultation bilaterally.  ABD: Soft, nontender, nondistended.   EXT: Full range of motion.  No edema.  NEURO: Cranial nerves II through XII are intact and symmetric. Bilateral upper and lower extremities grossly show full range of motion without any focal deficits.   PSYCH: Calm and cooperative, interactive.      ED Course, Procedures, & Data      Procedures  Results for orders placed during the hospital encounter of 03/24/23    POC US ECHO LIMITED    Impression  Bedside, focused cardiac ultrasound performed and interpreted by me.    Indication: cp    Parasternal long, parasternal short, apical four-chamber views were acquired for gross evaluation of pericardial effusion alone.  Image quality was satisfactory for evaluation of pericardial effusion. There is no evidence of free fluid within the pericardium.    Impression: Bedside limited cardiac ultrasound showing no pericardial effusion.                EKG at 0230.    cp at time of ECG.  ECG interpretted by me within 10 minutes of production  NSR at 88 bpm. Normal axis.  Normal intervals, exept QTc prolonged Nl R-wave progress. No ST-T elevations or depressions. Pathologic T-wave inversion are absent.     Interpretation: abn ECG  Compared with ECG January 4, 2023, QTc prolongation was present       Results for orders placed or performed during the hospital encounter of 03/24/23   XR Chest 2 Views     Status: None    Narrative    EXAM: CHEST 2 VIEWS  LOCATION: [facility]  DATE/TIME: 3/24/2023 3:17 AM    INDICATION: Chest pain.  COMPARISON: 1/4/2023.    FINDINGS: The lungs are clear. Unchanged cardiac silhouette. Tortuous or ectatic thoracic aorta again noted.      Impression    IMPRESSION: No evidence of active cardiopulmonary disease.    POC US ECHO LIMITED     Status: None    Impression    Bedside, focused cardiac ultrasound performed and interpreted by me.    Indication: cp    Parasternal  long, parasternal short, apical four-chamber views were acquired for gross evaluation of pericardial effusion alone.  Image quality was satisfactory for evaluation of pericardial effusion. There is no evidence of free fluid within the pericardium.     Impression: Bedside limited cardiac ultrasound showing no pericardial effusion.      Basic metabolic panel     Status: Abnormal   Result Value Ref Range    Sodium 140 136 - 145 mmol/L    Potassium 4.4 3.4 - 5.3 mmol/L    Chloride 105 98 - 107 mmol/L    Carbon Dioxide (CO2) 22 22 - 29 mmol/L    Anion Gap 13 7 - 15 mmol/L    Urea Nitrogen 18.8 8.0 - 23.0 mg/dL    Creatinine 1.34 (H) 0.67 - 1.17 mg/dL    Calcium 9.9 8.8 - 10.2 mg/dL    Glucose 73 70 - 99 mg/dL    GFR Estimate 58 (L) >60 mL/min/1.73m2   Troponin T, High Sensitivity     Status: Normal   Result Value Ref Range    Troponin T, High Sensitivity 16 <=22 ng/L   CBC with platelets and differential     Status: Abnormal   Result Value Ref Range    WBC Count 5.1 4.0 - 11.0 10e3/uL    RBC Count 5.04 4.40 - 5.90 10e6/uL    Hemoglobin 16.1 13.3 - 17.7 g/dL    Hematocrit 48.0 40.0 - 53.0 %    MCV 95 78 - 100 fL    MCH 31.9 26.5 - 33.0 pg    MCHC 33.5 31.5 - 36.5 g/dL    RDW 13.0 10.0 - 15.0 %    Platelet Count 148 (L) 150 - 450 10e3/uL    % Neutrophils 69 %    % Lymphocytes 22 %    % Monocytes 7 %    % Eosinophils 1 %    % Basophils 1 %    % Immature Granulocytes 0 %    NRBCs per 100 WBC 0 <1 /100    Absolute Neutrophils 3.6 1.6 - 8.3 10e3/uL    Absolute Lymphocytes 1.1 0.8 - 5.3 10e3/uL    Absolute Monocytes 0.4 0.0 - 1.3 10e3/uL    Absolute Eosinophils 0.1 0.0 - 0.7 10e3/uL    Absolute Basophils 0.0 0.0 - 0.2 10e3/uL    Absolute Immature Granulocytes 0.0 <=0.4 10e3/uL    Absolute NRBCs 0.0 10e3/uL   EKG 12 lead     Status: None (Preliminary result)   Result Value Ref Range    Systolic Blood Pressure  mmHg    Diastolic Blood Pressure  mmHg    Ventricular Rate 88 BPM    Atrial Rate 88 BPM    PA Interval 158 ms    QRS  Duration 86 ms     ms    QTc 503 ms    P Axis 53 degrees    R AXIS 25 degrees    T Axis 80 degrees    Interpretation ECG       Sinus rhythm  Minimal voltage criteria for LVH, may be normal variant  Nonspecific T wave abnormality  Prolonged QT  Abnormal ECG     CBC with platelets differential     Status: Abnormal    Narrative    The following orders were created for panel order CBC with platelets differential.  Procedure                               Abnormality         Status                     ---------                               -----------         ------                     CBC with platelets and d...[[id number]]  Abnormal            Final result                 Please view results for these tests on the individual orders.     Medications   acetaminophen (TYLENOL) tablet 1,000 mg (has no administration in time range)     Labs Ordered and Resulted from Time of ED Arrival to Time of ED Departure   BASIC METABOLIC PANEL - Abnormal       Result Value    Sodium 140      Potassium 4.4      Chloride 105      Carbon Dioxide (CO2) 22      Anion Gap 13      Urea Nitrogen 18.8      Creatinine 1.34 (*)     Calcium 9.9      Glucose 73      GFR Estimate 58 (*)    CBC WITH PLATELETS AND DIFFERENTIAL - Abnormal    WBC Count 5.1      RBC Count 5.04      Hemoglobin 16.1      Hematocrit 48.0      MCV 95      MCH 31.9      MCHC 33.5      RDW 13.0      Platelet Count 148 (*)     % Neutrophils 69      % Lymphocytes 22      % Monocytes 7      % Eosinophils 1      % Basophils 1      % Immature Granulocytes 0      NRBCs per 100 WBC 0      Absolute Neutrophils 3.6      Absolute Lymphocytes 1.1      Absolute Monocytes 0.4      Absolute Eosinophils 0.1      Absolute Basophils 0.0      Absolute Immature Granulocytes 0.0      Absolute NRBCs 0.0     TROPONIN T, HIGH SENSITIVITY - Normal    Troponin T, High Sensitivity 16       XR Chest 2 Views   Final Result   IMPRESSION: No evidence of active cardiopulmonary disease.       POC US  ECHO LIMITED   Final Result   Bedside, focused cardiac ultrasound performed and interpreted by me.      Indication: cp      Parasternal long, parasternal short, apical four-chamber views were acquired for gross evaluation of pericardial effusion alone.  Image quality was satisfactory for evaluation of pericardial effusion. There is no evidence of free fluid within the pericardium.       Impression: Bedside limited cardiac ultrasound showing no pericardial effusion.                 Critical care was not performed.     Medical Decision Making  The patient's presentation was of high complexity (an acute health issue posing potential threat to life or bodily function).    The patient's evaluation involved:  review of external note(s) from 3+ sources (see separate area of note for details)  ordering and/or review of 3+ test(s) in this encounter (see separate area of note for details)  review of 3+ test result(s) ordered prior to this encounter (see separate area of note for details)  independent interpretation of testing performed by another health professional (see separate area of note for details)    The patient's management necessitated moderate risk (limitations due to social determinants of health (see separate area of note for details)).      Assessment & Plan    67-year-old male with history as noted presenting with chest pain for approximately 5 to 6 hours duration, near constant.    Ddx is broad including Acute coronary syndrome, pulmonary embolism, pneumonia, pneumothorax, congestive heart failure, uremia, renal failure, among other causes    Clinically patient is nontoxic and well-appearing.  He was given a stretcher was able to sleep comfortably.  Bedside cardiac ultrasound performed and shows intact global function no pericardial effusion  ECG without evidence of acute ischemia  Laboratory is likewise unrevealing with a negative troponin  Chest x-ray clear.    CP improved with tylenol     A broad ddx was  considered as above. Given the pts presentation, clinical course, and workup, my suspicion for an emergent process is low. Pt improved on serial exams. Appropriate for outpt follow up with SERP.      I have reviewed the nursing notes. I have reviewed the findings, diagnosis, plan and need for follow up with the patient.    New Prescriptions    No medications on file       Final diagnoses:   Chest pain, unspecified type       Ignacio Frias MD    [facility] EMERGENCY DEPARTMENT  3/24/2023     Ignacio Frias MD  03/24/23 0407

## 2023-03-24 NOTE — ED TRIAGE NOTES
Pt comes to ED due to chest pain. Pt reports sudden onset approx 2100. Pt denies n/v and SOB. Pt in no apparent distress.      Triage Assessment     Row Name 03/24/23 0220       Triage Assessment (Adult)    Airway WDL WDL       Respiratory WDL    Respiratory WDL WDL       Skin Circulation/Temperature WDL    Skin Circulation/Temperature WDL WDL       Cardiac WDL    Cardiac WDL X  chest pain       Peripheral/Neurovascular WDL    Peripheral Neurovascular WDL WDL       Cognitive/Neuro/Behavioral WDL    Cognitive/Neuro/Behavioral WDL WDL

## 2023-03-24 NOTE — DISCHARGE INSTRUCTIONS
Return to the emergency department immediately for any chest pain, back pain, shortness of breath, weakness, abdominal pain nausea, vomiting, or any other concerns as given or discussed

## 2023-05-19 ENCOUNTER — APPOINTMENT (OUTPATIENT)
Dept: GENERAL RADIOLOGY | Facility: CLINIC | Age: 68
End: 2023-05-19
Attending: EMERGENCY MEDICINE
Payer: COMMERCIAL

## 2023-05-19 ENCOUNTER — HOSPITAL ENCOUNTER (EMERGENCY)
Facility: CLINIC | Age: 68
Discharge: HOME | End: 2023-05-19
Attending: EMERGENCY MEDICINE | Admitting: EMERGENCY MEDICINE
Payer: COMMERCIAL

## 2023-05-19 VITALS
HEART RATE: 96 BPM | HEIGHT: 68 IN | OXYGEN SATURATION: 97 % | SYSTOLIC BLOOD PRESSURE: 176 MMHG | BODY MASS INDEX: 26.98 KG/M2 | WEIGHT: 178 LBS | DIASTOLIC BLOOD PRESSURE: 96 MMHG | TEMPERATURE: 97.9 F | RESPIRATION RATE: 18 BRPM

## 2023-05-19 DIAGNOSIS — F17.210 CIGARETTE SMOKER: ICD-10-CM

## 2023-05-19 DIAGNOSIS — Z11.52 ENCOUNTER FOR SCREENING LABORATORY TESTING FOR SEVERE ACUTE RESPIRATORY SYNDROME CORONAVIRUS 2 (SARS-COV-2): ICD-10-CM

## 2023-05-19 DIAGNOSIS — J06.9 VIRAL URI WITH COUGH: ICD-10-CM

## 2023-05-19 DIAGNOSIS — R11.2 NAUSEA AND VOMITING, UNSPECIFIED VOMITING TYPE: ICD-10-CM

## 2023-05-19 LAB
ALBUMIN SERPL BCG-MCNC: 4.3 G/DL (ref 3.5–5.2)
ALP SERPL-CCNC: 75 U/L (ref 40–129)
ALT SERPL W P-5'-P-CCNC: 21 U/L (ref 10–50)
ANION GAP SERPL CALCULATED.3IONS-SCNC: 18 MMOL/L (ref 7–15)
AST SERPL W P-5'-P-CCNC: 22 U/L (ref 10–50)
BASOPHILS # BLD AUTO: 0 10E3/UL (ref 0–0.2)
BASOPHILS NFR BLD AUTO: 1 %
BILIRUB SERPL-MCNC: 1.2 MG/DL
BUN SERPL-MCNC: 20.9 MG/DL (ref 8–23)
CALCIUM SERPL-MCNC: 9.1 MG/DL (ref 8.8–10.2)
CHLORIDE SERPL-SCNC: 105 MMOL/L (ref 98–107)
CREAT SERPL-MCNC: 1.58 MG/DL (ref 0.67–1.17)
DEPRECATED HCO3 PLAS-SCNC: 17 MMOL/L (ref 22–29)
EOSINOPHIL # BLD AUTO: 0 10E3/UL (ref 0–0.7)
EOSINOPHIL NFR BLD AUTO: 1 %
ERYTHROCYTE [DISTWIDTH] IN BLOOD BY AUTOMATED COUNT: 13.9 % (ref 10–15)
FLUAV RNA SPEC QL NAA+PROBE: NEGATIVE
FLUBV RNA RESP QL NAA+PROBE: NEGATIVE
GFR SERPL CREATININE-BSD FRML MDRD: 48 ML/MIN/1.73M2
GLUCOSE SERPL-MCNC: 58 MG/DL (ref 70–99)
HCT VFR BLD AUTO: 48.5 % (ref 40–53)
HGB BLD-MCNC: 15.9 G/DL (ref 13.3–17.7)
HOLD SPECIMEN: NORMAL
HOLD SPECIMEN: NORMAL
IMM GRANULOCYTES # BLD: 0 10E3/UL
IMM GRANULOCYTES NFR BLD: 0 %
LIPASE SERPL-CCNC: 20 U/L (ref 13–60)
LYMPHOCYTES # BLD AUTO: 1 10E3/UL (ref 0.8–5.3)
LYMPHOCYTES NFR BLD AUTO: 16 %
MCH RBC QN AUTO: 31.5 PG (ref 26.5–33)
MCHC RBC AUTO-ENTMCNC: 32.8 G/DL (ref 31.5–36.5)
MCV RBC AUTO: 96 FL (ref 78–100)
MONOCYTES # BLD AUTO: 0.4 10E3/UL (ref 0–1.3)
MONOCYTES NFR BLD AUTO: 7 %
NEUTROPHILS # BLD AUTO: 4.6 10E3/UL (ref 1.6–8.3)
NEUTROPHILS NFR BLD AUTO: 75 %
NRBC # BLD AUTO: 0 10E3/UL
NRBC BLD AUTO-RTO: 0 /100
PLATELET # BLD AUTO: 196 10E3/UL (ref 150–450)
POTASSIUM SERPL-SCNC: 3.4 MMOL/L (ref 3.4–5.3)
PROCALCITONIN SERPL IA-MCNC: 0.12 NG/ML
PROT SERPL-MCNC: 7.5 G/DL (ref 6.4–8.3)
RBC # BLD AUTO: 5.05 10E6/UL (ref 4.4–5.9)
RSV RNA SPEC NAA+PROBE: NEGATIVE
SARS-COV-2 RNA RESP QL NAA+PROBE: NEGATIVE
SODIUM SERPL-SCNC: 140 MMOL/L (ref 136–145)
WBC # BLD AUTO: 6.1 10E3/UL (ref 4–11)

## 2023-05-19 PROCEDURE — 250N000011 HC RX IP 250 OP 636: Performed by: EMERGENCY MEDICINE

## 2023-05-19 PROCEDURE — 99284 EMERGENCY DEPT VISIT MOD MDM: CPT | Performed by: EMERGENCY MEDICINE

## 2023-05-19 PROCEDURE — 96374 THER/PROPH/DIAG INJ IV PUSH: CPT

## 2023-05-19 PROCEDURE — 71046 X-RAY EXAM CHEST 2 VIEWS: CPT

## 2023-05-19 PROCEDURE — 258N000003 HC RX IP 258 OP 636: Performed by: EMERGENCY MEDICINE

## 2023-05-19 PROCEDURE — 85025 COMPLETE CBC W/AUTO DIFF WBC: CPT | Performed by: EMERGENCY MEDICINE

## 2023-05-19 PROCEDURE — C9803 HOPD COVID-19 SPEC COLLECT: HCPCS

## 2023-05-19 PROCEDURE — 83690 ASSAY OF LIPASE: CPT | Performed by: EMERGENCY MEDICINE

## 2023-05-19 PROCEDURE — 36415 COLL VENOUS BLD VENIPUNCTURE: CPT | Performed by: EMERGENCY MEDICINE

## 2023-05-19 PROCEDURE — 96361 HYDRATE IV INFUSION ADD-ON: CPT

## 2023-05-19 PROCEDURE — 80053 COMPREHEN METABOLIC PANEL: CPT | Performed by: EMERGENCY MEDICINE

## 2023-05-19 PROCEDURE — 71046 X-RAY EXAM CHEST 2 VIEWS: CPT | Mod: 26 | Performed by: RADIOLOGY

## 2023-05-19 PROCEDURE — 99284 EMERGENCY DEPT VISIT MOD MDM: CPT | Mod: 25

## 2023-05-19 PROCEDURE — 87637 SARSCOV2&INF A&B&RSV AMP PRB: CPT | Performed by: EMERGENCY MEDICINE

## 2023-05-19 PROCEDURE — 84145 PROCALCITONIN (PCT): CPT | Performed by: EMERGENCY MEDICINE

## 2023-05-19 RX ORDER — BENZONATATE 100 MG/1
100 CAPSULE ORAL 3 TIMES DAILY PRN
Qty: 20 CAPSULE | Refills: 0 | Status: SHIPPED | OUTPATIENT
Start: 2023-05-19

## 2023-05-19 RX ORDER — ONDANSETRON 4 MG/1
4 TABLET, FILM COATED ORAL EVERY 6 HOURS PRN
Qty: 18 TABLET | Refills: 0 | Status: SHIPPED | OUTPATIENT
Start: 2023-05-19

## 2023-05-19 RX ORDER — ONDANSETRON 2 MG/ML
8 INJECTION INTRAMUSCULAR; INTRAVENOUS ONCE
Status: COMPLETED | OUTPATIENT
Start: 2023-05-19 | End: 2023-05-19

## 2023-05-19 RX ADMIN — ONDANSETRON 8 MG: 2 INJECTION INTRAMUSCULAR; INTRAVENOUS at 04:27

## 2023-05-19 RX ADMIN — SODIUM CHLORIDE 1000 ML: 9 INJECTION, SOLUTION INTRAVENOUS at 04:29

## 2023-05-19 ASSESSMENT — ACTIVITIES OF DAILY LIVING (ADL)
ADLS_ACUITY_SCORE: 35
ADLS_ACUITY_SCORE: 33

## 2023-05-19 NOTE — ED PROVIDER NOTES
History     Chief Complaint   Patient presents with     Flu Symptoms     HPI  Abhilash Gonzalez is a 67 year old male with PMH notable for latent TB, polysubstance use disorder who presents to the ED with vomiting. He notes intermittent episodes of vomiting approximately 1-2 per day, onset 2-3 days ago. Other associated sx include nausea, coughing, mild intermittent abdominal pain, nausea which began a couple weeks ago, and intermittent episodes of chills. Denies diarrhea and shortness of breath, fever, constipation. He reports no history of abdominal surgeries.      Past Medical History  Past Medical History:   Diagnosis Date     Back pain      Depression      Depressive disorder      DJD (degenerative joint disease)      Hypertension      Polysubstance abuse (H)      TB (tuberculosis) 2009    hx TB, states full regimine of medication taken in 2009 and xray in march 2018 looked good     TB lung, latent      Past Surgical History:   Procedure Laterality Date     BRONCHOSCOPY       COLONOSCOPY  12-5-12    Repeat Colonoscopy in 5 yrs.     HAND SURGERY      right palm     MANDIBLE SURGERY  10/2004     XR KNEE SURGERY JUANCHO RIGHT       benzonatate (TESSALON) 100 MG capsule  ondansetron (ZOFRAN) 4 MG tablet  acetaminophen (TYLENOL) 500 MG tablet  albuterol (PROAIR HFA) 108 (90 Base) MCG/ACT inhaler  albuterol (PROAIR HFA/PROVENTIL HFA/VENTOLIN HFA) 108 (90 Base) MCG/ACT inhaler  amLODIPine (NORVASC) 10 MG tablet  cefuroxime (CEFTIN) 500 MG tablet  cloNIDine (CATAPRES) 0.1 MG tablet  doxycycline hyclate (VIBRAMYCIN) 100 MG capsule  gabapentin (NEURONTIN) 300 MG capsule  lisinopril (PRINIVIL/ZESTRIL) 10 MG tablet  methocarbamol (ROBAXIN) 750 MG tablet  methylPREDNISolone (MEDROL DOSEPAK) 4 MG tablet therapy pack  nitroglycerin (NITROSTAT) 0.4 MG SL tablet  tamsulosin (FLOMAX) 0.4 MG capsule  traZODone (DESYREL) 50 MG tablet      Allergies   Allergen Reactions     Lisinopril Shortness Of Breath     Hydrocodone-Acetaminophen  "Itching     Morphine Itching     Family History  Family History   Problem Relation Age of Onset     Hypertension Mother      Cerebrovascular Disease Brother      Social History   Social History     Tobacco Use     Smoking status: Some Days     Packs/day: 1.00     Years: 15.00     Pack years: 15.00     Types: Cigarettes     Smokeless tobacco: Never   Substance Use Topics     Alcohol use: Yes     Comment: occasional couple beers     Drug use: Not Currently     Types: Cocaine, \"Crack\" cocaine, Marijuana         A medically appropriate review of systems was performed with pertinent positives and negatives noted in the HPI, and all other systems negative.    Physical Exam   BP: (!) 159/92  Pulse: 109  Temp: 97.9  F (36.6  C)  Resp: 20  Height: 172.7 cm (5' 8\")  Weight: 80.7 kg (178 lb)  SpO2: 97 %    Physical Exam  General: No acute distress. Appears stated age.  HENT: MMM, no oropharyngeal lesions  Eyes: PERRL, normal sclerae  Cardio:Regular rate. Regular Rhythm. Extremities well perfused.  Resp: Normal work of breathing, normal respiratory rate. Clear to auscultation bilaterally.   Abdomen: no tenderness, non-distended, no rebound, no guarding  Neuro: alert and fully oriented. CN II-XII grossly intact. Grossly normal strength and sensation in all extremities.   MSK: no deformities. Grossly normal ROM in extremities.   Integumentary/Skin: no rash visualized, normal color  Psych: normal affect, normal behavior    ED Course      Procedures          Labs Ordered and Resulted from Time of ED Arrival to Time of ED Departure   COMPREHENSIVE METABOLIC PANEL - Abnormal       Result Value    Sodium 140      Potassium 3.4      Chloride 105      Carbon Dioxide (CO2) 17 (*)     Anion Gap 18 (*)     Urea Nitrogen 20.9      Creatinine 1.58 (*)     Calcium 9.1      Glucose 58 (*)     Alkaline Phosphatase 75      AST 22      ALT 21      Protein Total 7.5      Albumin 4.3      Bilirubin Total 1.2      GFR Estimate 48 (*)  "   PROCALCITONIN - Abnormal    Procalcitonin 0.12 (*)    LIPASE - Normal    Lipase 20     INFLUENZA A/B, RSV, & SARS-COV2 PCR - Normal    Influenza A PCR Negative      Influenza B PCR Negative      RSV PCR Negative      SARS CoV2 PCR Negative     CBC WITH PLATELETS AND DIFFERENTIAL    WBC Count 6.1      RBC Count 5.05      Hemoglobin 15.9      Hematocrit 48.5      MCV 96      MCH 31.5      MCHC 32.8      RDW 13.9      Platelet Count 196      % Neutrophils 75      % Lymphocytes 16      % Monocytes 7      % Eosinophils 1      % Basophils 1      % Immature Granulocytes 0      NRBCs per 100 WBC 0      Absolute Neutrophils 4.6      Absolute Lymphocytes 1.0      Absolute Monocytes 0.4      Absolute Eosinophils 0.0      Absolute Basophils 0.0      Absolute Immature Granulocytes 0.0      Absolute NRBCs 0.0       Chest XR,  PA & LAT   Final Result   IMPRESSION: Normal heart size. Tortuous thoracic aorta. Lungs are clear. No radiographic evidence of active tuberculosis. No pneumothorax or pleural effusion.               Medical Decision Making  The patient's presentation was of moderate complexity (an acute illness with systemic symptoms).    The patient's evaluation involved:  ordering and/or review of 3+ test(s) in this encounter (see separate area of note for details)  independent interpretation of testing performed by another health professional (Chest x-ray)    The patient's management necessitated moderate risk (prescription drug management including medications given in the ED).    Assessments & Plan (with Medical Decision Making)   Patient presenting with vomiting and cough. Vitals in the ED unremarkable. Nursing notes reviewed.     No localizing RUQ pain nor LFT elevations to suggest hepatobiliary pathology. No lipase elevation to suggest pancreatitis. No peritoneal signs on exam to suggest peritonitis. Low clinical suspicion for bowel obstruction.    Chest x-ray without evidence of pneumonia, procalcitonin in low  risk range at 0.12.  Influenza and COVID-negative.    In the ED, the patient's symptoms were managed with ondansetron, with improvement in symptoms upon reassessment.  NS bolus also given.    The complete clinical picture is most consistent with likely viral infection causing cough and vomiting. After counseling on the diagnosis, work-up, and treatment plan, the patient was discharged to home. The patient was advised to follow-up with primary care in a few days. The patient was advised to return to the ED if worsening symptoms, or any urgent health concerns.     Final diagnoses:   Nausea and vomiting, unspecified vomiting type   Viral URI with cough     New Prescriptions    BENZONATATE (TESSALON) 100 MG CAPSULE    Take 1 capsule (100 mg) by mouth 3 times daily as needed (cough or throat pain)    ONDANSETRON (ZOFRAN) 4 MG TABLET    Take 1 tablet (4 mg) by mouth every 6 hours as needed for nausea or vomiting     Scribe Disclosure:   IJOHANNA, am serving as a scribe; to document services personally performed by No name on file -based on data collection and the provider's statements to me.     Provider Disclosure:  I agree with above History, Review of Systems, Physical exam and Plan.  I have reviewed the content of the documentation and have edited it as needed. I have personally performed the services documented here and the documentation accurately represents those services and the decisions I have made.      Electronically signed by:  Edgar Leon MD   Emergency Medicine   [facility] EMERGENCY DEPARTMENT  5/19/2023     Edgar Leon MD  05/19/23 0616

## 2023-05-19 NOTE — ED TRIAGE NOTES
Pt walk in from home with C/O N/V and productive cough x 3 days.  Denies CP, dizziness and palpitations.  VSS, pt is alert x 4 and in no acute distress.     Triage Assessment     Row Name 05/19/23 0258       Triage Assessment (Adult)    Airway WDL WDL       Respiratory WDL    Respiratory WDL WDL       Skin Circulation/Temperature WDL    Skin Circulation/Temperature WDL WDL       Cardiac WDL    Cardiac WDL WDL       Peripheral/Neurovascular WDL    Peripheral Neurovascular WDL WDL       Cognitive/Neuro/Behavioral WDL    Cognitive/Neuro/Behavioral WDL WDL

## 2023-05-19 NOTE — DISCHARGE INSTRUCTIONS
Instructions from your doctor today:  Emergency Department (ED) testing is focused on the potential causes of your symptoms that are the most dangerous possibilities, and cannot cover every possibility. Based on the evaluation, it was deemed sufficiently safe to discharge and continue management through the clinics. Thus, follow-up is very important to assess for improvement/worsening, potential further testing, and potential treatment adjustments. If you were given opioid pain medications or other medications that can make you drowsy while in the ED, you should not drive for at least several hours and not until you feel completely back to normal.     Please make an appointment to follow up with:  - Your Primary Care Provider in 3-6 days  - If you do not have a primary care provider, you can be seen in follow-up and establish care by calling any of the clinics below:     - [facility] (phone: [phone])     - [facility] (phone: [phone])   - Have your clinic provider review the results from today's visit with you again, including any potential follow-up or additional testing that may be needed based on the results. Occasionally, incidental findings are found on later review by radiologists that may need follow-up.     Return to the Emergency Department immediately if you have worsening symptoms, or any other urgent health concerns.

## 2023-08-04 ENCOUNTER — HOSPITAL ENCOUNTER (OUTPATIENT)
Facility: CLINIC | Age: 68
Setting detail: OBSERVATION
Discharge: LEFT BEFORE DISCHARGE | End: 2023-08-04
Attending: EMERGENCY MEDICINE | Admitting: EMERGENCY MEDICINE
Payer: COMMERCIAL

## 2023-08-04 ENCOUNTER — APPOINTMENT (OUTPATIENT)
Dept: GENERAL RADIOLOGY | Facility: CLINIC | Age: 68
End: 2023-08-04
Attending: EMERGENCY MEDICINE
Payer: COMMERCIAL

## 2023-08-04 VITALS
HEART RATE: 101 BPM | DIASTOLIC BLOOD PRESSURE: 94 MMHG | HEIGHT: 68 IN | SYSTOLIC BLOOD PRESSURE: 165 MMHG | RESPIRATION RATE: 18 BRPM | WEIGHT: 165 LBS | TEMPERATURE: 98.4 F | OXYGEN SATURATION: 100 % | BODY MASS INDEX: 25.01 KG/M2

## 2023-08-04 DIAGNOSIS — R55 SYNCOPE, UNSPECIFIED SYNCOPE TYPE: ICD-10-CM

## 2023-08-04 DIAGNOSIS — R00.0 TACHYCARDIA: ICD-10-CM

## 2023-08-04 LAB
ALBUMIN SERPL BCG-MCNC: 4 G/DL (ref 3.5–5.2)
ALP SERPL-CCNC: 82 U/L (ref 40–129)
ALT SERPL W P-5'-P-CCNC: 46 U/L (ref 0–70)
AMPHETAMINES UR QL SCN: ABNORMAL
ANION GAP SERPL CALCULATED.3IONS-SCNC: 16 MMOL/L (ref 7–15)
AST SERPL W P-5'-P-CCNC: 51 U/L (ref 0–45)
ATRIAL RATE - MUSE: 99 BPM
BARBITURATES UR QL SCN: ABNORMAL
BASOPHILS # BLD AUTO: 0 10E3/UL (ref 0–0.2)
BASOPHILS NFR BLD AUTO: 1 %
BENZODIAZ UR QL SCN: ABNORMAL
BILIRUB SERPL-MCNC: 0.8 MG/DL
BUN SERPL-MCNC: 16.3 MG/DL (ref 8–23)
BZE UR QL SCN: ABNORMAL
CALCIUM SERPL-MCNC: 9.1 MG/DL (ref 8.8–10.2)
CANNABINOIDS UR QL SCN: ABNORMAL
CHLORIDE SERPL-SCNC: 107 MMOL/L (ref 98–107)
CREAT SERPL-MCNC: 1.49 MG/DL (ref 0.67–1.17)
D DIMER PPP FEU-MCNC: 0.49 UG/ML FEU (ref 0–0.5)
DEPRECATED HCO3 PLAS-SCNC: 20 MMOL/L (ref 22–29)
DIASTOLIC BLOOD PRESSURE - MUSE: NORMAL MMHG
EOSINOPHIL # BLD AUTO: 0.1 10E3/UL (ref 0–0.7)
EOSINOPHIL NFR BLD AUTO: 2 %
ERYTHROCYTE [DISTWIDTH] IN BLOOD BY AUTOMATED COUNT: 13.3 % (ref 10–15)
GFR SERPL CREATININE-BSD FRML MDRD: 51 ML/MIN/1.73M2
GLUCOSE SERPL-MCNC: 118 MG/DL (ref 70–99)
HCT VFR BLD AUTO: 43.6 % (ref 40–53)
HGB BLD-MCNC: 14.9 G/DL (ref 13.3–17.7)
IMM GRANULOCYTES # BLD: 0 10E3/UL
IMM GRANULOCYTES NFR BLD: 0 %
INTERPRETATION ECG - MUSE: NORMAL
LYMPHOCYTES # BLD AUTO: 1.5 10E3/UL (ref 0.8–5.3)
LYMPHOCYTES NFR BLD AUTO: 28 %
MCH RBC QN AUTO: 32.7 PG (ref 26.5–33)
MCHC RBC AUTO-ENTMCNC: 34.2 G/DL (ref 31.5–36.5)
MCV RBC AUTO: 96 FL (ref 78–100)
MONOCYTES # BLD AUTO: 0.5 10E3/UL (ref 0–1.3)
MONOCYTES NFR BLD AUTO: 8 %
NEUTROPHILS # BLD AUTO: 3.4 10E3/UL (ref 1.6–8.3)
NEUTROPHILS NFR BLD AUTO: 61 %
NRBC # BLD AUTO: 0 10E3/UL
NRBC BLD AUTO-RTO: 0 /100
OPIATES UR QL SCN: ABNORMAL
P AXIS - MUSE: 70 DEGREES
PCP QUAL URINE (ROCHE): ABNORMAL
PLATELET # BLD AUTO: 221 10E3/UL (ref 150–450)
POTASSIUM SERPL-SCNC: 3.5 MMOL/L (ref 3.4–5.3)
PR INTERVAL - MUSE: 144 MS
PROT SERPL-MCNC: 7.2 G/DL (ref 6.4–8.3)
QRS DURATION - MUSE: 84 MS
QT - MUSE: 346 MS
QTC - MUSE: 444 MS
R AXIS - MUSE: 51 DEGREES
RBC # BLD AUTO: 4.55 10E6/UL (ref 4.4–5.9)
SODIUM SERPL-SCNC: 143 MMOL/L (ref 136–145)
SYSTOLIC BLOOD PRESSURE - MUSE: NORMAL MMHG
T AXIS - MUSE: 86 DEGREES
TROPONIN T SERPL HS-MCNC: 22 NG/L
TROPONIN T SERPL HS-MCNC: 22 NG/L
TROPONIN T SERPL HS-MCNC: 23 NG/L
VENTRICULAR RATE- MUSE: 99 BPM
WBC # BLD AUTO: 5.5 10E3/UL (ref 4–11)

## 2023-08-04 PROCEDURE — G0378 HOSPITAL OBSERVATION PER HR: HCPCS

## 2023-08-04 PROCEDURE — 36415 COLL VENOUS BLD VENIPUNCTURE: CPT | Performed by: INTERNAL MEDICINE

## 2023-08-04 PROCEDURE — 84484 ASSAY OF TROPONIN QUANT: CPT | Performed by: EMERGENCY MEDICINE

## 2023-08-04 PROCEDURE — 36415 COLL VENOUS BLD VENIPUNCTURE: CPT | Performed by: EMERGENCY MEDICINE

## 2023-08-04 PROCEDURE — 84484 ASSAY OF TROPONIN QUANT: CPT | Performed by: INTERNAL MEDICINE

## 2023-08-04 PROCEDURE — 96360 HYDRATION IV INFUSION INIT: CPT

## 2023-08-04 PROCEDURE — 85025 COMPLETE CBC W/AUTO DIFF WBC: CPT | Performed by: EMERGENCY MEDICINE

## 2023-08-04 PROCEDURE — 85379 FIBRIN DEGRADATION QUANT: CPT | Performed by: EMERGENCY MEDICINE

## 2023-08-04 PROCEDURE — 80053 COMPREHEN METABOLIC PANEL: CPT | Performed by: EMERGENCY MEDICINE

## 2023-08-04 PROCEDURE — 93005 ELECTROCARDIOGRAM TRACING: CPT

## 2023-08-04 PROCEDURE — 99285 EMERGENCY DEPT VISIT HI MDM: CPT | Mod: 25

## 2023-08-04 PROCEDURE — 80307 DRUG TEST PRSMV CHEM ANLYZR: CPT | Performed by: INTERNAL MEDICINE

## 2023-08-04 PROCEDURE — 99222 1ST HOSP IP/OBS MODERATE 55: CPT | Performed by: INTERNAL MEDICINE

## 2023-08-04 PROCEDURE — 71046 X-RAY EXAM CHEST 2 VIEWS: CPT

## 2023-08-04 PROCEDURE — 258N000003 HC RX IP 258 OP 636: Performed by: INTERNAL MEDICINE

## 2023-08-04 PROCEDURE — 258N000003 HC RX IP 258 OP 636: Performed by: EMERGENCY MEDICINE

## 2023-08-04 RX ORDER — SODIUM CHLORIDE 9 MG/ML
INJECTION, SOLUTION INTRAVENOUS CONTINUOUS
Status: DISCONTINUED | OUTPATIENT
Start: 2023-08-04 | End: 2023-08-04 | Stop reason: HOSPADM

## 2023-08-04 RX ORDER — SODIUM CHLORIDE, SODIUM LACTATE, POTASSIUM CHLORIDE, CALCIUM CHLORIDE 600; 310; 30; 20 MG/100ML; MG/100ML; MG/100ML; MG/100ML
INJECTION, SOLUTION INTRAVENOUS CONTINUOUS
Status: DISCONTINUED | OUTPATIENT
Start: 2023-08-04 | End: 2023-08-04 | Stop reason: HOSPADM

## 2023-08-04 RX ORDER — NITROGLYCERIN 0.4 MG/1
0.4 TABLET SUBLINGUAL EVERY 5 MIN PRN
Status: DISCONTINUED | OUTPATIENT
Start: 2023-08-04 | End: 2023-08-04 | Stop reason: HOSPADM

## 2023-08-04 RX ORDER — ACETAMINOPHEN 650 MG/1
650 SUPPOSITORY RECTAL EVERY 4 HOURS PRN
Status: DISCONTINUED | OUTPATIENT
Start: 2023-08-04 | End: 2023-08-04 | Stop reason: HOSPADM

## 2023-08-04 RX ORDER — ONDANSETRON 2 MG/ML
4 INJECTION INTRAMUSCULAR; INTRAVENOUS EVERY 6 HOURS PRN
Status: DISCONTINUED | OUTPATIENT
Start: 2023-08-04 | End: 2023-08-04 | Stop reason: HOSPADM

## 2023-08-04 RX ORDER — HYDRALAZINE HYDROCHLORIDE 20 MG/ML
10 INJECTION INTRAMUSCULAR; INTRAVENOUS EVERY 4 HOURS PRN
Status: DISCONTINUED | OUTPATIENT
Start: 2023-08-04 | End: 2023-08-04 | Stop reason: HOSPADM

## 2023-08-04 RX ORDER — ALBUTEROL SULFATE 90 UG/1
2 AEROSOL, METERED RESPIRATORY (INHALATION) EVERY 4 HOURS PRN
Status: DISCONTINUED | OUTPATIENT
Start: 2023-08-04 | End: 2023-08-04 | Stop reason: HOSPADM

## 2023-08-04 RX ORDER — ONDANSETRON 4 MG/1
4 TABLET, ORALLY DISINTEGRATING ORAL EVERY 6 HOURS PRN
Status: DISCONTINUED | OUTPATIENT
Start: 2023-08-04 | End: 2023-08-04 | Stop reason: HOSPADM

## 2023-08-04 RX ORDER — LABETALOL HYDROCHLORIDE 5 MG/ML
10 INJECTION, SOLUTION INTRAVENOUS
Status: DISCONTINUED | OUTPATIENT
Start: 2023-08-04 | End: 2023-08-04 | Stop reason: HOSPADM

## 2023-08-04 RX ORDER — LIDOCAINE 40 MG/G
CREAM TOPICAL
Status: DISCONTINUED | OUTPATIENT
Start: 2023-08-04 | End: 2023-08-04 | Stop reason: HOSPADM

## 2023-08-04 RX ORDER — ACETAMINOPHEN 325 MG/1
650 TABLET ORAL EVERY 4 HOURS PRN
Status: DISCONTINUED | OUTPATIENT
Start: 2023-08-04 | End: 2023-08-04 | Stop reason: HOSPADM

## 2023-08-04 RX ADMIN — SODIUM CHLORIDE, POTASSIUM CHLORIDE, SODIUM LACTATE AND CALCIUM CHLORIDE 1000 ML: 600; 310; 30; 20 INJECTION, SOLUTION INTRAVENOUS at 01:22

## 2023-08-04 RX ADMIN — SODIUM CHLORIDE: 9 INJECTION, SOLUTION INTRAVENOUS at 04:28

## 2023-08-04 ASSESSMENT — ACTIVITIES OF DAILY LIVING (ADL)
ADLS_ACUITY_SCORE: 31
ADLS_ACUITY_SCORE: 35
ADLS_ACUITY_SCORE: 33
ADLS_ACUITY_SCORE: 35

## 2023-08-04 NOTE — ED PROVIDER NOTES
"  History     Chief Complaint:  Syncope       HPI   Abhilash Gonzalez is a 67 year old male with a history of hypertension and CAD presenting for two episodes of syncope. Patient states that while he was on the bus this morning he began to feel fuzzy and lost consciousness. Patient states he felt ok afterwards and was taking it easy and drank a couple bottles of water. This evening at 2200 he came out of the bathroom, was walking to the couch, and lost consciousness again. Patient didn't want to come to the ED but was convinced to by his family. Patient was not outside today and did not do any strenuous activities. Patient states that he has been congested for the past couple of days and has been \"spitting out green stuff\". Patient denies fever, vomiting, and diarrhea. Patient follows with [facility].    Independent Historian:   None - Patient Only    Review of External Notes:   None     Medications:    Albuterol inhaler   Amlodipine   Benzonatate   Ceftin   Clonidine   Gabapentin    Methocarbamol   Methylprednisolone   Ondansetron   Flomax   Trazodone  Proscar     Past Medical History:    Anxiety   Back pain   CAD  Chronic kidney disease   Depressive disorder  Degenerative joint disease  GERD   Hypertension   Polysubstance abuse   Tuberculosis   TB lung, latent   Benign prostatic hyperplasia with nocturia    Neuralgia and neuritis   Chronic low back pain  degenerative disc disease, lumbar   Neuropathy   Osteoarthritis   Cecal volvulus     Past Surgical History:    Bronchoscopy   Colonoscopy   Hand surgery   Knee surgery      Physical Exam   Patient Vitals for the past 24 hrs:   BP Temp Temp src Pulse Resp SpO2 Height Weight   08/04/23 0337 (!) 178/109 98.4  F (36.9  C) Oral 102 18 100 % 1.727 m (5' 8\") 74.8 kg (165 lb)   08/04/23 0300 (!) 161/89 -- -- 109 17 93 % -- --   08/04/23 0222 (!) 168/88 -- -- 115 20 92 % -- --   08/04/23 0202 (!) 175/93 -- -- 113 -- -- -- --   08/04/23 0120 (!) 161/92 -- -- 107 21 -- " -- --   08/04/23 0057 (!) 149/98 97.8  F (36.6  C) Temporal 114 16 98 % -- --        Physical Exam  General: Appears well-developed and well-nourished.   Head: No signs of trauma.   Mouth/Throat: Oropharynx is clear and moist.   Eyes: Conjunctivae are normal. Pupils are equal, round, and reactive to light.   Neck: Normal range of motion. No nuchal rigidity. No cervical adenopathy  CV:  Tachycardic and regular rhythm.    Resp: Effort normal and breath sounds normal. No respiratory distress.   GI: Soft. There is no tenderness.  No rebound or guarding.  Normal bowel sounds.    MSK: Normal range of motion. no edema. No Calf tenderness.  Neuro: The patient is alert and oriented to person, place, and time.  PERRLA, EOMI, strength in upper/lower extremities normal and symmetrical. Sensation normal. Speech normal.  Skin: Skin is warm and dry. No rash noted.   Psych: normal mood and affect. behavior is normal.       Emergency Department Course   ECG  ECG taken at 0115, ECG read at 0121  Normal sinus rhythm   Minimal voltage criteria for LVH, may be normal variant  Nonspecific T wave abnormality   Abnormal ECG   QT has shortened as compared to prior, dated 3/24/23.  Rate 99 bpm. IN interval 144 ms. QRS duration 84 ms. QT/QTc 346/444 ms. P-R-T axes 70 51 86.     Imaging:  XR Chest 2 Views   Final Result   IMPRESSION: No acute abnormality.      Echocardiogram Complete    (Results Pending)      Report per radiology    Laboratory:  Labs Ordered and Resulted from Time of ED Arrival to Time of ED Departure   COMPREHENSIVE METABOLIC PANEL - Abnormal       Result Value    Sodium 143      Potassium 3.5      Chloride 107      Carbon Dioxide (CO2) 20 (*)     Anion Gap 16 (*)     Urea Nitrogen 16.3      Creatinine 1.49 (*)     Calcium 9.1      Glucose 118 (*)     Alkaline Phosphatase 82      AST 51 (*)     ALT 46      Protein Total 7.2      Albumin 4.0      Bilirubin Total 0.8      GFR Estimate 51 (*)    TROPONIN T, HIGH SENSITIVITY -  Abnormal    Troponin T, High Sensitivity 23 (*)    D DIMER QUANTITATIVE - Normal    D-Dimer Quantitative 0.49     CBC WITH PLATELETS AND DIFFERENTIAL    WBC Count 5.5      RBC Count 4.55      Hemoglobin 14.9      Hematocrit 43.6      MCV 96      MCH 32.7      MCHC 34.2      RDW 13.3      Platelet Count 221      % Neutrophils 61      % Lymphocytes 28      % Monocytes 8      % Eosinophils 2      % Basophils 1      % Immature Granulocytes 0      NRBCs per 100 WBC 0      Absolute Neutrophils 3.4      Absolute Lymphocytes 1.5      Absolute Monocytes 0.5      Absolute Eosinophils 0.1      Absolute Basophils 0.0      Absolute Immature Granulocytes 0.0      Absolute NRBCs 0.0          Emergency Department Course & Assessments:  Interventions:  Medications   lactated ringers infusion ( Intravenous Canceled Entry 8/4/23 0427)   hydrALAZINE (APRESOLINE) injection 10 mg (has no administration in time range)   labetalol (NORMODYNE/TRANDATE) injection 10 mg (has no administration in time range)   albuterol (PROVENTIL HFA/VENTOLIN HFA) inhaler (has no administration in time range)   lidocaine 1 % 0.1-1 mL (has no administration in time range)   lidocaine (LMX4) cream (has no administration in time range)   sodium chloride (PF) 0.9% PF flush 3 mL (3 mLs Intracatheter $Given 8/4/23 0428)   sodium chloride (PF) 0.9% PF flush 3 mL (has no administration in time range)   nitroGLYcerin (NITROSTAT) sublingual tablet 0.4 mg (has no administration in time range)   ondansetron (ZOFRAN ODT) ODT tab 4 mg (has no administration in time range)     Or   ondansetron (ZOFRAN) injection 4 mg (has no administration in time range)   acetaminophen (TYLENOL) tablet 650 mg (has no administration in time range)   acetaminophen (TYLENOL) Suppository 650 mg (has no administration in time range)   sodium chloride 0.9% infusion ( Intravenous $New Bag 8/4/23 0428)   lactated ringers BOLUS 1,000 mL (0 mLs Intravenous Stopped 8/4/23 0222)      Independent  Interpretation (X-rays, CTs, rhythm strip):  On my independent interpretation of CXR there is no pneumothorax       Assessments/Consultations/Discussion of Management or Tests:   ED Course as of 08/04/23 0527   Fri Aug 04, 2023   0107 I examined the patient and obtained history as noted above.     0223 I rechecked and updated the patient.     0236 I spoke to Dr. Asher, hospitalist, regarding the patient. He accepted him for admission.       Social Determinants of Health affecting care:   None    Disposition:  The patient was admitted to the hospital under the care of Dr. Asher.     Impression & Plan      Medical Decision Making:  Abhilash Gonzalez presents after 2 syncopal episodes today.  Apparently this morning while waiting for a bus he had a syncopal episode.  He was able to carry about his day, but this evening had a second episode ultimately prompting him to come to the hospital.  At the time of my evaluation he felt generally unwell but had no specific symptoms.  He was noted to be mildly tachycardic.  Blood was obtained that showed a detectable but low troponin.  Remainder of his blood work was appropriate.  Chest x-ray and EKG did not show any concerning findings.  I did attempt that the patient stand, and he felt dizzy and unsteady.  He continued to be tachycardic despite IV fluids in the emergency department.  He had a negative D-dimer and is not having any shortness of breath or chest pain.  Patient was admitted to the hospital service for further monitoring and evaluation given the multiple syncopal episodes.    Diagnosis:    ICD-10-CM    1. Syncope, unspecified syncope type  R55       2. Tachycardia  R00.0              Scribe Disclosure:  AAYUSH, Zoila Marr, am serving as a scribe at 1:50 AM on 8/4/2023 to document services personally performed by Abhilash Haywood MD based on my observations and the provider's statements to me.   8/4/2023   Abhilash Haywood MD Bergenstal, John A,  MD  08/04/23 0528

## 2023-08-04 NOTE — DISCHARGE SUMMARY
Brief hospitalist discharge summary  Non-billing      Patient left against medical advice.  Unable to see him prior to leaving.     See RN note for further details. See admission H&P from earlier today for other details.    Patient was admitted earlier today with apparent syncopal episodes and sinus tachycardia.  Also has history of HTN, CKD III, BPH, chronic lower back pain, tobacco use disorder, hx of cocaine use and others.

## 2023-08-04 NOTE — PROGRESS NOTES
RECEIVING UNIT ED HANDOFF REVIEW    ED Nurse Handoff Report was reviewed by: Millie Morris RN on August 4, 2023 at 3:21 AM

## 2023-08-04 NOTE — ED NOTES
[facility]  ED Nurse Handoff Report    ED Chief complaint: Syncope      ED Diagnosis:   Final diagnoses:   Syncope, unspecified syncope type   Tachycardia       Code Status: Full Code    Allergies:   Allergies   Allergen Reactions    Lisinopril Shortness Of Breath    Hydrocodone-Acetaminophen Itching    Morphine Itching       Patient Story: see ED triage note  Focused Assessment:  pt states he is feeling very weak    Treatments and/or interventions provided: labs, imaging, fluids  Patient's response to treatments and/or interventions: tolerated well     To be done/followed up on inpatient unit:  per MD orders    Does this patient have any cognitive concerns?:  none    Activity level - Baseline/Home:  Independent  Activity Level - Current:   Stand with Assist    Patient's Preferred language: English   Needed?: No    Isolation: None  Infection: Not Applicable  Patient tested for COVID 19 prior to admission: NO  Bariatric?: No    Vital Signs:   Vitals:    08/04/23 0057 08/04/23 0120 08/04/23 0202 08/04/23 0222   BP: (!) 149/98 (!) 161/92 (!) 175/93 (!) 168/88   Pulse: 114 107 113 115   Resp: 16 21  20   Temp: 97.8  F (36.6  C)      TempSrc: Temporal      SpO2: 98%   92%       Cardiac Rhythm:     Was the PSS-3 completed:   Yes  What interventions are required if any?               Family Comments: notified per pt   OBS brochure/video discussed/provided to patient/family: Yes              Name of person given brochure if not patient:               Relationship to patient:     For the majority of the shift this patient's behavior was Green.   Behavioral interventions performed were none.    ED NURSE PHONE NUMBER: [phone]

## 2023-08-04 NOTE — ED TRIAGE NOTES
Pt reports two episodes of syncope today. First occurred this am while waiting for bus, pt states he was lightheaded with blurry vision and slid down and had LOC approx 2-3 minutes. Second episode occurred tonight, after using the bathroom, pt was walking back to sofa in living room when he felt lightheaded again and had syncopal episode falling onto the sofa. Pt reports eating and drinking normally today.      Triage Assessment       Row Name 08/04/23 0058       Respiratory WDL    Respiratory WDL WDL       Cardiac WDL    Cardiac WDL WDL       Cognitive/Neuro/Behavioral WDL    Cognitive/Neuro/Behavioral WDL WDL

## 2023-08-04 NOTE — H&P
[facility]    History and Physical - Hospitalist Service       Date of Admission:  8/4/2023    Assessment & Plan      Abhilash Gonzalez is a 67 year old male admitted on 8/4/2023. He presents after syncopal episodes    Note: history somewhat limited by pt's somnolence    Syncope  Sinus tachycardia  Has had 2 syncopal episodes today. [place] and had LOC. [place] ok afterwards but at 2200 at home was walking from couch to bathroom and had LOC. No cp, possible palpitations. In ED tachy 100-110s, hypertensive. Orthostatics negative. Trop 23. CXR clear. EKG w/o acute ischemic changes.   *worrisome for dysrhythmia. ? Cocaine use. No focal neuro sx grossly  - telemetry  - serial troponins  - repeat orthostatics  - echo  - likely will need event monitor  - may need cardiology vs neuro pending progress    Hypertension  [Needs rec- amlodipine 10 mg daily, clonidine 0.1 mg BID]  - resume meds with rec  - prn hydralazine, labetalol SBP>180    CKD III  Creatinine at 1.49 on presentation. Baseline ~1.3-1.5.   - avoid nephrotoxins  - monitor    BPH  - resume tamsulosin 0.4 mg daily with rec    Chronic low back pain  - resume gabapentin with rec    Tobacco use disorder  Possible COPD  - prn duonebs available    Hx cocaine use disorder  Denies current drug use  - utox    Hx TB  Per chart review     Diet:  Regular  DVT Prophylaxis: Ambulate every shift  Ford Catheter: Not present  Lines: None     Cardiac Monitoring: None  Code Status:  full    Clinically Significant Risk Factors Present on Admission                  # Hypertension: Home medication list includes antihypertensive(s)               Disposition Plan      Expected Discharge Date: 08/05/2023                  Alexis Asher MD  Hospitalist Service  [facility]  Securely message with [employer] (more info)  Text page via [employer] - teamwork across global offices Paging/[employer]      ______________________________________________________________________    Chief Complaint   syncope    History is obtained from the patient, electronic health record, and emergency department physician    History of Present Illness   Abhilash Gonzalez is a 67 year old male who who presents after 2 syncopal episodes.  Note patient is very sleepy at the time of visit for in the morning.  He is arousable but he immediately falls back asleep repeatedly.  History is somewhat limited secondary to this.  Patient states he has had 3 syncopal events.  He reports there was one last winter.  Then today he was at the bus stop standing when he felt woozy and the next thing he knew he passed out.  He states people caught him so he did not fall and hurt himself.  He has not long he was out but he did get back on the bus.  He does felt fuzzy after it.  He states he might of felt a rapid heart rate but did not have any chest pain.  he states the rest of the day went okay.  Then this evening he was in the bathroom and then he was walking to his couch.  He did not have any dizziness or lightheaded walking but then he lost consciousness when he got to his .  Afterwards he just felt weak.  He reports that he may have had a rapid heart rate but does not know.  He had no chest pain.  He has shortness of breath but this is chronic.  He has had no nausea or vomiting.  He has no diarrhea.  He has no abdominal pain.      Past Medical History    Past Medical History:   Diagnosis Date    Back pain     Depression     Depressive disorder     DJD (degenerative joint disease)     Hypertension     Polysubstance abuse (H)     TB (tuberculosis) 2009    hx TB, states full regimine of medication taken in 2009 and xray in march 2018 looked good    TB lung, latent        Past Surgical History   Past Surgical History:   Procedure Laterality Date    BRONCHOSCOPY      COLONOSCOPY  12-5-12    Repeat Colonoscopy in 5 yrs.    HAND SURGERY      right  palm    MANDIBLE SURGERY  10/2004    XR KNEE SURGERY JUANCHO RIGHT         Prior to Admission Medications   Prior to Admission Medications   Prescriptions Last Dose Informant Patient Reported? Taking?   acetaminophen (TYLENOL) 500 MG tablet   No No   Sig: Take 1 tablet (500 mg) by mouth every 6 hours as needed for mild pain   albuterol (PROAIR HFA) 108 (90 Base) MCG/ACT inhaler   No No   Sig: Inhale 2 puffs into the lungs every 4 hours as needed for shortness of breath / dyspnea   albuterol (PROAIR HFA/PROVENTIL HFA/VENTOLIN HFA) 108 (90 Base) MCG/ACT inhaler   No No   Sig: Inhale 2 puffs into the lungs every 4 hours as needed for shortness of breath / dyspnea or wheezing   amLODIPine (NORVASC) 10 MG tablet   No No   Sig: Take 1 tablet (10 mg) by mouth daily   benzonatate (TESSALON) 100 MG capsule   No No   Sig: Take 1 capsule (100 mg) by mouth 3 times daily as needed (cough or throat pain)   cefuroxime (CEFTIN) 500 MG tablet   No No   Sig: Take 1 tablet (500 mg) by mouth 2 times daily   cloNIDine (CATAPRES) 0.1 MG tablet   No No   Sig: Take 1 tablet (0.1 mg) by mouth 2 times daily   doxycycline hyclate (VIBRAMYCIN) 100 MG capsule   No No   Sig: Take 1 capsule (100 mg) by mouth 2 times daily   gabapentin (NEURONTIN) 300 MG capsule   No No   Sig: Take 1 capsule (300 mg) by mouth 3 times daily   lisinopril (PRINIVIL/ZESTRIL) 10 MG tablet   No No   Sig: Take 1 tablet (10 mg) by mouth daily   Patient not taking: Reported on 12/9/2022   methocarbamol (ROBAXIN) 750 MG tablet   No No   Sig: Take 1 tablet (750 mg) by mouth 4 times daily as needed (muscle pain/spasm)   methylPREDNISolone (MEDROL DOSEPAK) 4 MG tablet therapy pack   No No   Sig: Follow Package Directions   nitroglycerin (NITROSTAT) 0.4 MG SL tablet  Self No No   Sig: Place 1 tablet (0.4 mg) under the tongue every 5 minutes as needed for chest pain If you are still having symptoms after 3 doses (15 minutes) call 911.   Patient not taking: Reported on 10/29/2019  "  ondansetron (ZOFRAN) 4 MG tablet   No No   Sig: Take 1 tablet (4 mg) by mouth every 6 hours as needed for nausea or vomiting   tamsulosin (FLOMAX) 0.4 MG capsule   No No   Sig: Take 1 capsule (0.4 mg) by mouth daily   traZODone (DESYREL) 50 MG tablet   No No   Sig: Take 1 tablet (50 mg) by mouth At Bedtime Prn insomnia      Facility-Administered Medications: None        Review of Systems    The 10 point Review of Systems is negative other than noted in the HPI or here.     Social History   I have reviewed this patient's social history and updated it with pertinent information if needed.  Social History     Tobacco Use    Smoking status: Some Days     Packs/day: 1.00     Years: 15.00     Pack years: 15.00     Types: Cigarettes    Smokeless tobacco: Never   Substance Use Topics    Alcohol use: Yes     Comment: occasional couple beers    Drug use: Not Currently     Types: Cocaine, \"Crack\" cocaine, Marijuana        Physical Exam   Vital Signs: Temp: 97.8  F (36.6  C) Temp src: Temporal BP: (!) 168/88 Pulse: 115   Resp: 20 SpO2: 92 %      Weight: 0 lbs 0 oz    General Appearance: Somnolent but arousable, repeatedly falls back asleep, pleasant  Respiratory: clear bilaterally  Cardiovascular: tachy, regular. No murmur. No edema  GI: soft, nt/nd  Skin: no rashes or lesions grossly    Other: CN grossly intact, BARBOUR      Medical Decision Making       60 MINUTES SPENT BY ME on the date of service doing chart review, history, exam, documentation & further activities per the note.      Data     I have personally reviewed the following data over the past 24 hrs:    5.5  \   14.9   / 221     143 107 16.3 /  118 (H)   3.5 20 (L) 1.49 (H) \     ALT: 46 AST: 51 (H) AP: 82 TBILI: 0.8   ALB: 4.0 TOT PROTEIN: 7.2 LIPASE: N/A     Trop: 23 (H) BNP: N/A     INR:  N/A PTT:  N/A   D-dimer:  0.49 Fibrinogen:  N/A       Imaging results reviewed over the past 24 hrs:   Recent Results (from the past 24 hour(s))   XR Chest 2 Views    Narrative "    EXAM: XR CHEST 2 VIEWS  LOCATION: [facility]  DATE: 8/4/2023    INDICATION: syncope  COMPARISON: 05/19/2023.    FINDINGS: The heart size is normal. The thoracic ureter is calcified and tortuous. Mild scarring at the lung bases. The lungs are otherwise clear. There is no pneumothorax or pleural effusion.      Impression    IMPRESSION: No acute abnormality.

## 2023-08-04 NOTE — PLAN OF CARE
Goal Outcome Evaluation  Orientation/Cognitive: A/O x4  Observation Goals (Met/ Not Met): Not met  Mobility Level/Assist Equipment: SBA  Fall Risk (Y/N): Y  Behavior Concerns: None  Pain Management: Denies  Tele/VS/O2: VSS on RA, ex HTN. Tele: SR  ABNL Lab/BG: Crt 1.49, Trops 23,   Diet: Regular  Bowel/Bladder: Continent  Skin Concerns: None  Drains/Devices: NS @ 100/hr  Tests/Procedures for next shift: Echo  Anticipated DC date & active delays: TBD  Patient Stated Goal for Today: Rest       Observation goals  PRIOR TO DISCHARGE        Comments: List all  goals to be met before discharge:  - Diagnostic tests and consults completed and resulted: Not met  - No further episodes of syncope and any new arrhythmia addressed with controlled heart rates: Met  - Vital signs normal or at patient baseline and orthostatic vitals are normal and patient not lightheaded with standing: Partially met, no syncope. Hypertensive  - Tolerating oral intake to maintain hydration: Met  - Safe disposition plan has been identified: Not met  - Nurse to notify provider when observation goals have been met and patient is ready for discharge.

## 2023-08-04 NOTE — ED NOTES
Bed: ED24  Expected date: 8/4/23  Expected time: 12:54 AM  Means of arrival:   Comments:  Triage

## 2023-08-04 NOTE — PLAN OF CARE
"Writer was called by nursing assistant that the patient needed to be to work by 0900 and wanted his IV out. Writer went to patient's room and talked with patient regarding leaving AMA. Writer informed patient that the provider wanted to have an echocardiogram done which is an ultrasound of the heart to see if there is any structural, movement, or pressure issues that could have lead to the fainting he had experienced; after the echocardiogram cardiology or neurology may be consulted depending on what may be found to treat any underlying cause of his fainting. The patient stated, \"I understand all of that, but I need to leave and you need to take this IV out of me.\" Writer removed PIV; patient gathered all belongings. AMA paper work printed by charge nurse; writer read the AMA paper work to the patient ensuring the patient understood the risk of leaving and that the hospital and providers are not responsible for injury. Patient signed and writer signed as witness. Provider was paged at 0847 that patient was leaving; provider stated he could not make it up if patient was leaving that quickly. Patient left unit at 0854.  "

## 2023-09-10 ENCOUNTER — HOSPITAL ENCOUNTER (EMERGENCY)
Facility: CLINIC | Age: 68
Discharge: HOME | End: 2023-09-11
Attending: EMERGENCY MEDICINE | Admitting: EMERGENCY MEDICINE
Payer: COMMERCIAL

## 2023-09-10 VITALS
WEIGHT: 178 LBS | TEMPERATURE: 98 F | HEART RATE: 99 BPM | OXYGEN SATURATION: 95 % | RESPIRATION RATE: 17 BRPM | HEIGHT: 68 IN | SYSTOLIC BLOOD PRESSURE: 148 MMHG | DIASTOLIC BLOOD PRESSURE: 88 MMHG | BODY MASS INDEX: 26.98 KG/M2

## 2023-09-10 DIAGNOSIS — R55 RECURRENT SYNCOPE: ICD-10-CM

## 2023-09-10 LAB
ALBUMIN SERPL BCG-MCNC: 3.9 G/DL (ref 3.5–5.2)
ALP SERPL-CCNC: 73 U/L (ref 40–129)
ALT SERPL W P-5'-P-CCNC: 81 U/L (ref 0–70)
ANION GAP SERPL CALCULATED.3IONS-SCNC: 14 MMOL/L (ref 7–15)
AST SERPL W P-5'-P-CCNC: 24 U/L (ref 0–45)
ATRIAL RATE - MUSE: 102 BPM
BASOPHILS # BLD AUTO: 0 10E3/UL (ref 0–0.2)
BASOPHILS NFR BLD AUTO: 1 %
BILIRUB SERPL-MCNC: 0.6 MG/DL
BUN SERPL-MCNC: 16.9 MG/DL (ref 8–23)
CALCIUM SERPL-MCNC: 8.8 MG/DL (ref 8.8–10.2)
CHLORIDE SERPL-SCNC: 107 MMOL/L (ref 98–107)
CREAT SERPL-MCNC: 1.45 MG/DL (ref 0.67–1.17)
DEPRECATED HCO3 PLAS-SCNC: 20 MMOL/L (ref 22–29)
DIASTOLIC BLOOD PRESSURE - MUSE: NORMAL MMHG
EGFRCR SERPLBLD CKD-EPI 2021: 53 ML/MIN/1.73M2
EOSINOPHIL # BLD AUTO: 0.1 10E3/UL (ref 0–0.7)
EOSINOPHIL NFR BLD AUTO: 3 %
ERYTHROCYTE [DISTWIDTH] IN BLOOD BY AUTOMATED COUNT: 13.2 % (ref 10–15)
GLUCOSE SERPL-MCNC: 122 MG/DL (ref 70–99)
HCT VFR BLD AUTO: 43.5 % (ref 40–53)
HGB BLD-MCNC: 14.6 G/DL (ref 13.3–17.7)
HOLD SPECIMEN: NORMAL
HOLD SPECIMEN: NORMAL
IMM GRANULOCYTES # BLD: 0 10E3/UL
IMM GRANULOCYTES NFR BLD: 0 %
INTERPRETATION ECG - MUSE: NORMAL
LYMPHOCYTES # BLD AUTO: 1.1 10E3/UL (ref 0.8–5.3)
LYMPHOCYTES NFR BLD AUTO: 27 %
MCH RBC QN AUTO: 32.1 PG (ref 26.5–33)
MCHC RBC AUTO-ENTMCNC: 33.6 G/DL (ref 31.5–36.5)
MCV RBC AUTO: 96 FL (ref 78–100)
MONOCYTES # BLD AUTO: 0.5 10E3/UL (ref 0–1.3)
MONOCYTES NFR BLD AUTO: 12 %
NEUTROPHILS # BLD AUTO: 2.4 10E3/UL (ref 1.6–8.3)
NEUTROPHILS NFR BLD AUTO: 57 %
NRBC # BLD AUTO: 0 10E3/UL
NRBC BLD AUTO-RTO: 0 /100
P AXIS - MUSE: 61 DEGREES
PLATELET # BLD AUTO: 168 10E3/UL (ref 150–450)
POTASSIUM SERPL-SCNC: 3.7 MMOL/L (ref 3.4–5.3)
PR INTERVAL - MUSE: 152 MS
PROT SERPL-MCNC: 6.9 G/DL (ref 6.4–8.3)
QRS DURATION - MUSE: 88 MS
QT - MUSE: 372 MS
QTC - MUSE: 484 MS
R AXIS - MUSE: 46 DEGREES
RBC # BLD AUTO: 4.55 10E6/UL (ref 4.4–5.9)
SODIUM SERPL-SCNC: 141 MMOL/L (ref 136–145)
SYSTOLIC BLOOD PRESSURE - MUSE: NORMAL MMHG
T AXIS - MUSE: 89 DEGREES
TROPONIN T SERPL HS-MCNC: 22 NG/L
VENTRICULAR RATE- MUSE: 102 BPM
WBC # BLD AUTO: 4.2 10E3/UL (ref 4–11)

## 2023-09-10 PROCEDURE — 93005 ELECTROCARDIOGRAM TRACING: CPT

## 2023-09-10 PROCEDURE — 85004 AUTOMATED DIFF WBC COUNT: CPT | Performed by: EMERGENCY MEDICINE

## 2023-09-10 PROCEDURE — 84484 ASSAY OF TROPONIN QUANT: CPT | Performed by: EMERGENCY MEDICINE

## 2023-09-10 PROCEDURE — 36415 COLL VENOUS BLD VENIPUNCTURE: CPT | Performed by: EMERGENCY MEDICINE

## 2023-09-10 PROCEDURE — 99284 EMERGENCY DEPT VISIT MOD MDM: CPT

## 2023-09-10 PROCEDURE — 80053 COMPREHEN METABOLIC PANEL: CPT | Performed by: EMERGENCY MEDICINE

## 2023-09-10 ASSESSMENT — ACTIVITIES OF DAILY LIVING (ADL): ADLS_ACUITY_SCORE: 35

## 2023-09-11 NOTE — ED TRIAGE NOTES
Patient states seen last week for fainting, dizziness.  Fainted again twice yesterday.  Denies hitting head.  States he slid down the wall.      Triage Assessment       Row Name 09/10/23 [id number]       Triage Assessment (Adult)    Airway WDL WDL       Respiratory WDL    Respiratory WDL WDL       Skin Circulation/Temperature WDL    Skin Circulation/Temperature WDL WDL       Cardiac WDL    Cardiac WDL WDL       Peripheral/Neurovascular WDL    Peripheral Neurovascular WDL WDL       Cognitive/Neuro/Behavioral WDL    Cognitive/Neuro/Behavioral WDL X;all  States fainted 2x.  Dizziness last week.    Level of Consciousness alert    Orientation oriented x 4    Speech clear;spontaneous       [place] Coma Scale    Best Eye Response 4-->(E4) spontaneous    Best Motor Response 6-->(M6) obeys commands    Best Verbal Response 5-->(V5) oriented    [place] Coma Scale Score 15

## 2023-09-11 NOTE — DISCHARGE INSTRUCTIONS
Continue to work with your cardiologist at [facility] for your passing out spells.  Continue to take all meds as directed.  Return to the ER for any other emergent concerns.    Discharge Instructions  Syncope    Syncope (fainting) is a sudden, short loss of consciousness (passing out spell). People will usually fall to the ground when they faint or slump over if seated.  People may also shake when this happens, and it can sometimes be difficult to tell the difference between syncope and a seizure. At this time, your provider does not find a reason to suspect that your fainting spell is a sign of anything dangerous or life-threatening.  However, sometimes the signs of serious illness do not show up right away.     Generally, every Emergency Department visit should have a follow-up clinic visit with either a primary or a specialty clinic/provider. Please follow-up as instructed by your emergency provider today.    Return to the Emergency Department if:  You faint again.   You have any significant bleeding.  You have chest pain or a fast or irregular heartbeat.  You feel short of breath.  You cough up any blood.  You have abdominal (belly) pain or unusual back pain.  You have ongoing vomiting (throwing up) or diarrhea (loose stools).  You have a black or tarry bowel movement, or blood in the stool or in your vomit.  You have a fever over 101 F.  You lose feeling or cannot move a part of your body or cannot talk normally.  You are confused, have a headache, cannot see well, or have a seizure.  DO NOT DRIVE. CALL 911 INSTEAD!    What can I do to help myself?  Follow any specific instructions that your provider discussed with you.  If you feel light-headed, make sure to sit down right away, even if you have to sit on the floor.  Follow up with your regular medical provider as discussed for further management. This may include lowering your blood pressure medications, insulin or other diabetic  medications, checking your blood sugar more frequently, and drinking more fluids, taking medicines for vomiting or diarrhea or getting up slower.  If you were given a prescription for medicine here today, be sure to read all of the information (including the package insert) that comes with your prescription.  This will include important information about the medicine, its side effects, and any warnings that you need to know about.  The pharmacist who fills the prescription can provide more information and answer questions you may have about the medicine.  If you have questions or concerns that the pharmacist cannot address, please call or return to the Emergency Department.   Remember that you can always come back to the Emergency Department if you are not able to see your regular provider in the amount of time listed above, if you get any new symptoms, or if there is anything that worries you.

## 2023-09-11 NOTE — ED PROVIDER NOTES
"  History     Chief Complaint:  Syncope       HPI   Abhilash Gonzalez is a 68 year old male with a history of uncontrolled hypertension and cocaine abuse with recent issues of recurrent syncope who presents to us because of 2 further syncopal spells today.  The patient was evaluated in this emergency department on August 4 for syncope and was admitted though he left AGAINST MEDICAL ADVICE.  He was then admitted to the [facility] hospital on September 3 where he stayed for 4 days and underwent a very thorough investigation including cardiac CT, echocardiogram, and consultation with cardiology.  Medications were adjusted and he was discharged 3 days ago.  He was advised to be sure to be drinking ample fluids, to continue on his medications, and to avoid cocaine.    The patient notes that he was feeling fairly well today.  However, as he was standing and walking slowly, he started to get his typical feeling where \"my head feels funny.\"  He then started to feel that things were turning black.  He was able to lean against a wall and he slid down the wall.  He does not believe that he completely lost consciousness.  He did not suffer any trauma.  He again became concerned that this happened and decided to come to visit us to see if there is anything more serious.  He denied chest pain, shortness of breath, or any ongoing symptoms at this time.  He notes that he is been drinking appropriate fluids and denies missing any of his medications.      Independent Historian:   None - Patient Only    Review of External Notes:   Yes-I thoroughly reviewed [place] [facility] [place] and reviewed his cardiac work-up.      Medications:    acetaminophen (TYLENOL) 500 MG tablet  albuterol (PROAIR HFA) 108 (90 Base) MCG/ACT inhaler  albuterol (PROAIR HFA/PROVENTIL HFA/VENTOLIN HFA) 108 (90 Base) MCG/ACT inhaler  amLODIPine (NORVASC) 10 MG tablet  benzonatate (TESSALON) 100 MG " capsule  cefuroxime (CEFTIN) 500 MG tablet  cloNIDine (CATAPRES) 0.1 MG tablet  doxycycline hyclate (VIBRAMYCIN) 100 MG capsule  gabapentin (NEURONTIN) 300 MG capsule  lisinopril (PRINIVIL/ZESTRIL) 10 MG tablet  methocarbamol (ROBAXIN) 750 MG tablet  methylPREDNISolone (MEDROL DOSEPAK) 4 MG tablet therapy pack  nitroglycerin (NITROSTAT) 0.4 MG SL tablet  ondansetron (ZOFRAN) 4 MG tablet  tamsulosin (FLOMAX) 0.4 MG capsule  traZODone (DESYREL) 50 MG tablet        Past Medical History:    Past Medical History:   Diagnosis Date    Back pain     Depression     Depressive disorder     DJD (degenerative joint disease)     Hypertension     Polysubstance abuse (H)     TB (tuberculosis) 2009    TB lung, latent        Past Surgical History:    Past Surgical History:   Procedure Laterality Date    BRONCHOSCOPY      COLONOSCOPY  12-5-12    Repeat Colonoscopy in 5 yrs.    HAND SURGERY      right palm    MANDIBLE SURGERY  10/2004    XR KNEE SURGERY JUANCHO RIGHT          Physical Exam   No data found.       Physical Exam  Eye:  Pupils are equal, round, and reactive.  Extraocular movements intact.    ENT:  No rhinorrhea.  Moist mucus membranes.  Normal tongue and tonsil.    Cardiac:  Regular rate and rhythm.  No murmurs, gallops, or rubs.    Pulmonary:  Clear to auscultation bilaterally.  No wheezes, rales, or rhonchi.    Abdomen:  Positive bowel sounds.  Abdomen is soft and non-distended, without focal tenderness.    Musculoskeletal:  Normal movement of all extremities without evidence for deficit.    Skin:  Warm and dry without rashes.    Neurologic:  Non-focal exam without asymmetric weakness or numbness.     Psychiatric:  Normal affect with appropriate interaction with examiner.  The patient is rather somnolent, falling asleep during our conversation, though he does awaken with verbal stimuli and turning off the lights.      Emergency Department Course   ECG  ECG taken at 2226, ECG read at 2354  Sinus tachycardia with LVH  criteria   Unchanged as compared to prior, dated 08/04/2023.  Rate 102 bpm. NM interval 152 ms. QRS duration 88 ms. QT/QTc 372/484 ms. P-R-T axes 61 46 89.       Laboratory:  Labs Ordered and Resulted from Time of ED Arrival to Time of ED Departure   COMPREHENSIVE METABOLIC PANEL - Abnormal       Result Value    Sodium 141      Potassium 3.7      Chloride 107      Carbon Dioxide (CO2) 20 (*)     Anion Gap 14      Urea Nitrogen 16.9      Creatinine 1.45 (*)     Calcium 8.8      Glucose 122 (*)     Alkaline Phosphatase 73      AST 24      ALT 81 (*)     Protein Total 6.9      Albumin 3.9      Bilirubin Total 0.6      GFR Estimate 53 (*)    TROPONIN T, HIGH SENSITIVITY - Normal    Troponin T, High Sensitivity 22     CBC WITH PLATELETS AND DIFFERENTIAL    WBC Count 4.2      RBC Count 4.55      Hemoglobin 14.6      Hematocrit 43.5      MCV 96      MCH 32.1      MCHC 33.6      RDW 13.2      Platelet Count 168      % Neutrophils 57      % Lymphocytes 27      % Monocytes 12      % Eosinophils 3      % Basophils 1      % Immature Granulocytes 0      NRBCs per 100 WBC 0      Absolute Neutrophils 2.4      Absolute Lymphocytes 1.1      Absolute Monocytes 0.5      Absolute Eosinophils 0.1      Absolute Basophils 0.0      Absolute Immature Granulocytes 0.0      Absolute NRBCs 0.0            Emergency Department Course & Assessments:      Interventions:  Medications - No data to display       Independent Interpretation (X-rays, CTs, rhythm strip):  None    Consultations/Discussion of Management or Tests:  None        Social Determinants of Health affecting care:   None    Disposition:  The patient was discharged to home.     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  He was seen at this emergency department and admitted, leaving AGAINST MEDICAL ADVICE.  He just finished a 4-day stay at  "[facility] for these exact issues and has undergone all the appropriate tests and consultation with cardiology.  Medications were adjusted.  He describes the exact same symptoms today, having a prodrome of feeling lightheaded, sweaty, and having tunnel vision.  He leaned against a wall and slid down the wall, suffering no trauma.  Nonetheless, he would like answers as to \"why this keeps happening to me.\"    At the time of my assessment, he denies any ongoing symptoms.  EKG is reassuring.  Blood work is reassuring.  He is not orthostatic.  I explained to him that there is no real emergent concern that would be changed today that would benefit from repeat admission to the hospital or other testing.  He was advised to continue with his medications.  He has a scheduled outpatient cardiology appointment.  We discussed the importance of staying well-hydrated.  He voices understanding of all of this and is comfortable being discharged home.  I advised him to keep a log and to consider obtaining a blood pressure monitor for home.  Otherwise, he will return to us for any other emergent concerns.      Diagnosis:    ICD-10-CM    1. Recurrent syncope  R55            Discharge Medications:  Discharge Medication List as of 9/11/2023 12:02 AM             Chad A. Trierweiler, MD  9/10/2023   Trierweiler, Chad A, MD Trierweiler, Chad A, MD  09/12/23 1323    "

## 2023-09-11 NOTE — ED NOTES
Twice episode of fainting attack this week as stated.  He felt dizzy then passed out ,denies weakness on all extremities,having SOB .      Bloods taken but refused to be inserted an iv line.

## 2023-09-22 ENCOUNTER — APPOINTMENT (OUTPATIENT)
Dept: CARDIOLOGY | Facility: CLINIC | Age: 68
End: 2023-09-22
Attending: EMERGENCY MEDICINE
Payer: COMMERCIAL

## 2023-09-22 ENCOUNTER — HOSPITAL ENCOUNTER (EMERGENCY)
Facility: CLINIC | Age: 68
Discharge: HOME | End: 2023-09-22
Attending: EMERGENCY MEDICINE | Admitting: EMERGENCY MEDICINE
Payer: COMMERCIAL

## 2023-09-22 VITALS
BODY MASS INDEX: 26.37 KG/M2 | DIASTOLIC BLOOD PRESSURE: 116 MMHG | RESPIRATION RATE: 20 BRPM | SYSTOLIC BLOOD PRESSURE: 178 MMHG | OXYGEN SATURATION: 92 % | HEART RATE: 88 BPM | WEIGHT: 174 LBS | TEMPERATURE: 97.5 F | HEIGHT: 68 IN

## 2023-09-22 DIAGNOSIS — F14.90 COCAINE USE: ICD-10-CM

## 2023-09-22 DIAGNOSIS — I10 HYPERTENSION, UNSPECIFIED TYPE: ICD-10-CM

## 2023-09-22 DIAGNOSIS — R55 SYNCOPE, UNSPECIFIED SYNCOPE TYPE: ICD-10-CM

## 2023-09-22 DIAGNOSIS — R55 RECURRENT SYNCOPE: ICD-10-CM

## 2023-09-22 DIAGNOSIS — Z91.148 NONCOMPLIANCE WITH MEDICATION REGIMEN: ICD-10-CM

## 2023-09-22 LAB
ANION GAP SERPL CALCULATED.3IONS-SCNC: 18 MMOL/L (ref 7–15)
ATRIAL RATE - MUSE: 106 BPM
ATRIAL RATE - MUSE: 90 BPM
BASOPHILS # BLD AUTO: 0 10E3/UL (ref 0–0.2)
BASOPHILS NFR BLD AUTO: 0 %
BUN SERPL-MCNC: 25.9 MG/DL (ref 8–23)
CALCIUM SERPL-MCNC: 9.5 MG/DL (ref 8.8–10.2)
CHLORIDE SERPL-SCNC: 102 MMOL/L (ref 98–107)
CREAT SERPL-MCNC: 1.95 MG/DL (ref 0.67–1.17)
DEPRECATED HCO3 PLAS-SCNC: 21 MMOL/L (ref 22–29)
DIASTOLIC BLOOD PRESSURE - MUSE: NORMAL MMHG
DIASTOLIC BLOOD PRESSURE - MUSE: NORMAL MMHG
EGFRCR SERPLBLD CKD-EPI 2021: 37 ML/MIN/1.73M2
EOSINOPHIL # BLD AUTO: 0 10E3/UL (ref 0–0.7)
EOSINOPHIL NFR BLD AUTO: 1 %
ERYTHROCYTE [DISTWIDTH] IN BLOOD BY AUTOMATED COUNT: 13.2 % (ref 10–15)
FLUAV RNA SPEC QL NAA+PROBE: NEGATIVE
FLUBV RNA RESP QL NAA+PROBE: NEGATIVE
GLUCOSE SERPL-MCNC: 67 MG/DL (ref 70–99)
HCT VFR BLD AUTO: 44.5 % (ref 40–53)
HGB BLD-MCNC: 14.6 G/DL (ref 13.3–17.7)
IMM GRANULOCYTES # BLD: 0 10E3/UL
IMM GRANULOCYTES NFR BLD: 0 %
INTERPRETATION ECG - MUSE: NORMAL
INTERPRETATION ECG - MUSE: NORMAL
LYMPHOCYTES # BLD AUTO: 1.2 10E3/UL (ref 0.8–5.3)
LYMPHOCYTES NFR BLD AUTO: 19 %
MAGNESIUM SERPL-MCNC: 2.3 MG/DL (ref 1.7–2.3)
MCH RBC QN AUTO: 31.7 PG (ref 26.5–33)
MCHC RBC AUTO-ENTMCNC: 32.8 G/DL (ref 31.5–36.5)
MCV RBC AUTO: 97 FL (ref 78–100)
MONOCYTES # BLD AUTO: 0.5 10E3/UL (ref 0–1.3)
MONOCYTES NFR BLD AUTO: 7 %
NEUTROPHILS # BLD AUTO: 4.7 10E3/UL (ref 1.6–8.3)
NEUTROPHILS NFR BLD AUTO: 73 %
NRBC # BLD AUTO: 0 10E3/UL
NRBC BLD AUTO-RTO: 0 /100
P AXIS - MUSE: 63 DEGREES
P AXIS - MUSE: 74 DEGREES
PLATELET # BLD AUTO: 173 10E3/UL (ref 150–450)
POTASSIUM SERPL-SCNC: 3.7 MMOL/L (ref 3.4–5.3)
PR INTERVAL - MUSE: 146 MS
PR INTERVAL - MUSE: 156 MS
QRS DURATION - MUSE: 88 MS
QRS DURATION - MUSE: 90 MS
QT - MUSE: 402 MS
QT - MUSE: 436 MS
QTC - MUSE: 533 MS
QTC - MUSE: 533 MS
R AXIS - MUSE: 20 DEGREES
R AXIS - MUSE: 41 DEGREES
RBC # BLD AUTO: 4.61 10E6/UL (ref 4.4–5.9)
RSV RNA SPEC NAA+PROBE: NEGATIVE
SARS-COV-2 RNA RESP QL NAA+PROBE: NEGATIVE
SODIUM SERPL-SCNC: 141 MMOL/L (ref 136–145)
SYSTOLIC BLOOD PRESSURE - MUSE: NORMAL MMHG
SYSTOLIC BLOOD PRESSURE - MUSE: NORMAL MMHG
T AXIS - MUSE: 84 DEGREES
T AXIS - MUSE: 93 DEGREES
TROPONIN T SERPL HS-MCNC: 22 NG/L
VENTRICULAR RATE- MUSE: 106 BPM
VENTRICULAR RATE- MUSE: 90 BPM
WBC # BLD AUTO: 6.4 10E3/UL (ref 4–11)

## 2023-09-22 PROCEDURE — 87637 SARSCOV2&INF A&B&RSV AMP PRB: CPT | Performed by: EMERGENCY MEDICINE

## 2023-09-22 PROCEDURE — 250N000013 HC RX MED GY IP 250 OP 250 PS 637: Performed by: EMERGENCY MEDICINE

## 2023-09-22 PROCEDURE — 99285 EMERGENCY DEPT VISIT HI MDM: CPT

## 2023-09-22 PROCEDURE — 80048 BASIC METABOLIC PNL TOTAL CA: CPT | Performed by: EMERGENCY MEDICINE

## 2023-09-22 PROCEDURE — 36415 COLL VENOUS BLD VENIPUNCTURE: CPT | Performed by: EMERGENCY MEDICINE

## 2023-09-22 PROCEDURE — 83735 ASSAY OF MAGNESIUM: CPT | Performed by: EMERGENCY MEDICINE

## 2023-09-22 PROCEDURE — 93005 ELECTROCARDIOGRAM TRACING: CPT | Mod: 76

## 2023-09-22 PROCEDURE — 93005 ELECTROCARDIOGRAM TRACING: CPT

## 2023-09-22 PROCEDURE — 93242 EXT ECG>48HR<7D RECORDING: CPT

## 2023-09-22 PROCEDURE — 85004 AUTOMATED DIFF WBC COUNT: CPT | Performed by: EMERGENCY MEDICINE

## 2023-09-22 PROCEDURE — 84484 ASSAY OF TROPONIN QUANT: CPT | Performed by: EMERGENCY MEDICINE

## 2023-09-22 RX ORDER — VALSARTAN 40 MG/1
40 TABLET ORAL DAILY
Status: DISCONTINUED | OUTPATIENT
Start: 2023-09-22 | End: 2023-09-22 | Stop reason: HOSPADM

## 2023-09-22 RX ORDER — CARVEDILOL 12.5 MG/1
12.5 TABLET ORAL ONCE
Status: COMPLETED | OUTPATIENT
Start: 2023-09-22 | End: 2023-09-22

## 2023-09-22 RX ORDER — MAGNESIUM SULFATE HEPTAHYDRATE 40 MG/ML
2 INJECTION, SOLUTION INTRAVENOUS ONCE
Status: DISCONTINUED | OUTPATIENT
Start: 2023-09-22 | End: 2023-09-22 | Stop reason: HOSPADM

## 2023-09-22 RX ADMIN — CARVEDILOL 12.5 MG: 12.5 TABLET, FILM COATED ORAL at 11:52

## 2023-09-22 RX ADMIN — VALSARTAN 40 MG: 40 TABLET, FILM COATED ORAL at 11:52

## 2023-09-22 ASSESSMENT — ACTIVITIES OF DAILY LIVING (ADL)
ADLS_ACUITY_SCORE: 35
ADLS_ACUITY_SCORE: 35

## 2023-09-22 NOTE — ED NOTES
Orthostatic Bps performed:    Lyin/111  Sittin/113  Standin/131 (Pt endorses dizziness when standing, which resolves upon lying down).

## 2023-09-22 NOTE — ED PROVIDER NOTES
History   Chief Complaint:  Syncope    HPI   History supplemented by electronic chart review    Abhilash Gonzalez is a 68 year old male who presents for evaluation of syncope.  He has a long history of recurrent syncope.  He states that he had been sitting down for a while when he stood up and felt lightheaded and then lost consciousness.  He denies any injuries.  He states that he is taking his blood pressure medicines but is unable to give their names, doses, and states that he does not have them with him.  He reports that he continues to use cocaine, most recently yesterday.  He reports that some doctor at 1 point told him he needed surgery and he would be interested in pursuing that now.  He denies any new pain.  He states he has had syncope off and on for a long time.  He states he has a stable place to stay.    Review of External Notes: I personally performed a chronic chart review, I see that he has a long history of syncope and chest symptoms dating back at least a year, he has had extensive prior work-up including pulmonary embolism CTs, CT angiogram of the coronary arteries, and echocardiograms.  Most recently he was admitted to [facility] for multiple days at which time he had an echocardiogram showing an ejection fraction of 35 to 40% and a CT coronary angiogram without evidence of coronary artery disease, his amlodipine was stopped.  They recommended a Holter but he left before this could be applied.  He was then in this emergency department on September 10 after syncope and had a benign evaluation so he was discharged.      Medications:    acetaminophen (TYLENOL) 500 MG tablet  albuterol (PROAIR HFA) 108 (90 Base) MCG/ACT inhaler  albuterol (PROAIR HFA/PROVENTIL HFA/VENTOLIN HFA) 108 (90 Base) MCG/ACT inhaler  amLODIPine (NORVASC) 10 MG tablet  benzonatate (TESSALON) 100 MG capsule  cefuroxime (CEFTIN) 500 MG tablet  cloNIDine (CATAPRES) 0.1 MG tablet  doxycycline hyclate (VIBRAMYCIN) 100 MG  capsule  gabapentin (NEURONTIN) 300 MG capsule  lisinopril (PRINIVIL/ZESTRIL) 10 MG tablet  methocarbamol (ROBAXIN) 750 MG tablet  methylPREDNISolone (MEDROL DOSEPAK) 4 MG tablet therapy pack  nitroglycerin (NITROSTAT) 0.4 MG SL tablet  ondansetron (ZOFRAN) 4 MG tablet  tamsulosin (FLOMAX) 0.4 MG capsule  traZODone (DESYREL) 50 MG tablet      Past Medical History:    Past Medical History:   Diagnosis Date    Back pain     Depression     Depressive disorder     DJD (degenerative joint disease)     Hypertension     Polysubstance abuse (H)     TB (tuberculosis) 2009    TB lung, latent        Patient Active Problem List    Diagnosis Date Noted    Tachycardia 08/04/2023     Priority: Medium    Syncope, unspecified syncope type 08/04/2023     Priority: Medium    Cecal volvulus (H) 01/21/2016     Priority: Medium    Degeneration of intervertebral disc 08/26/2015     Priority: Medium     Overview:   Diagnosed on XR.  Advanced arthosis at L5-S1 on lumbar XR in March 2011.  Patient was previous using Percocet however his U. tox was positive for marijuana and cocaine. Patient was informed he would no longer be using narcotics to control his pain. We will use a combination of tramadol and Tylenol. Patient was advised to start physical therapy if this does not control his pain we could consider epidural injection at the L5/S1 level.        Depressive disorder 07/30/2015     Priority: Medium    Neuralgia and neuritis 07/30/2015     Priority: Medium    Chronic low back pain 03/18/2015     Priority: Medium     Overview:   Pt has some form of congential defect?  Pt not sure what it is.  Pt has larry having pain in his back fro many years but only became bothersome since 2006.  Pt has previously had cortisone injections.    Lumbar XR 2011:  Showed mild to moderate facet arthrosis throughout the lumbar spine. Advanced degenerative disk disease changes at L5-S1.      DDD (degenerative disc disease), lumbar 01/15/2015     Priority: Medium  "   OA (osteoarthritis of spine) 06/13/2011     Priority: Medium    Spondylosis 06/13/2011     Priority: Medium    Other, mixed, or unspecified nondependent drug abuse, unspecified 03/21/2011     Priority: Medium     Overview:   Urine drug screen from March 2011 was positive for marijuana and cocaine.      CARDIOVASCULAR SCREENING; LDL GOAL LESS THAN 130 10/31/2010     Priority: Medium    Elevated blood pressure 09/05/2010     Priority: Medium    Chest pain 09/05/2010     Priority: Medium    Cocaine abuse (H) 09/05/2010     Priority: Medium    Tobacco use disorder 09/05/2010     Priority: Medium        Physical Exam   Patient Vitals for the past 24 hrs:   BP Temp Temp src Pulse Resp SpO2 Height Weight   09/22/23 1300 (!) 178/116 -- -- 88 20 92 % -- --   09/22/23 1230 (!) 187/114 -- -- -- 20 -- -- --   09/22/23 1145 (!) 187/143 -- -- -- 20 -- -- --   09/22/23 1100 (!) 187/43 -- -- -- -- -- -- --   09/22/23 1050 (!) 203/124 -- -- 91 21 -- -- --   09/22/23 1041 (!) 206/131 -- -- 93 16 -- -- --   09/22/23 1039 (!) 195/113 -- -- 92 -- -- -- --   09/22/23 1037 (!) 181/111 -- -- 93 -- -- -- --   09/22/23 0724 (!) 177/105 97.5  F (36.4  C) Temporal 109 16 95 % 1.727 m (5' 8\") 78.9 kg (174 lb)      Physical Exam  General: Male semirecumbent in room 11  HENT: mucous membranes moist, thyroid nonpalpable  CV: rate as above, regular rhythm, no lower extremity edema, no JVD, palpable symmetric radial pulses, no murmur audible  Resp: normal effort, speaks in full phrases, no stridor, no cough observed  GI: abdomen soft and nontender  MSK: no bony tenderness   Skin: appropriately warm and dry  Neuro: alert, clear speech, oriented though somewhat poor historian, symmetric strength in extremities, no nuchal rigidity  Psych: cooperative with basic cares, no apparent hallucinations    Emergency Department Course   Electrocardiogram  ECG taken at 0727, ECG interpreted at 1029 by WENDY Frank MD  Sinus tachycardia, voltage is compatible " with LVH, QT interval measured by computer at 533  Rate 106 bpm. ND interval 146. QRS duration 88. QTc 533    Electrocardiogram  ECG taken at 1109, ECG interpreted at 1144 by WENDY Frank MD  Sinus rhythm, voltage is compatible with LVH, QT interval 533  Rate 90 bpm. ND interval 156. QRS duration 90. QTc 533    Imaging:  Leadless EKG Monitor 3 to 14 Days    (Results Pending)      Laboratory:  Labs Ordered and Resulted from Time of ED Arrival to Time of ED Departure   BASIC METABOLIC PANEL - Abnormal       Result Value    Sodium 141      Potassium 3.7      Chloride 102      Carbon Dioxide (CO2) 21 (*)     Anion Gap 18 (*)     Urea Nitrogen 25.9 (*)     Creatinine 1.95 (*)     Calcium 9.5      Glucose 67 (*)     GFR Estimate 37 (*)    TROPONIN T, HIGH SENSITIVITY - Normal    Troponin T, High Sensitivity 22     MAGNESIUM - Normal    Magnesium 2.3     INFLUENZA A/B, RSV, & SARS-COV2 PCR - Normal    Influenza A PCR Negative      Influenza B PCR Negative      RSV PCR Negative      SARS CoV2 PCR Negative     CBC WITH PLATELETS AND DIFFERENTIAL    WBC Count 6.4      RBC Count 4.61      Hemoglobin 14.6      Hematocrit 44.5      MCV 97      MCH 31.7      MCHC 32.8      RDW 13.2      Platelet Count 173      % Neutrophils 73      % Lymphocytes 19      % Monocytes 7      % Eosinophils 1      % Basophils 0      % Immature Granulocytes 0      NRBCs per 100 WBC 0      Absolute Neutrophils 4.7      Absolute Lymphocytes 1.2      Absolute Monocytes 0.5      Absolute Eosinophils 0.0      Absolute Basophils 0.0      Absolute Immature Granulocytes 0.0      Absolute NRBCs 0.0        Emergency Department Course:  Reviewed:  I reviewed nursing notes, vitals, and past medical history    Assessments/Consultations/Discussion of Management or Tests :  I obtained history and examined the patient as noted above.      Interventions:  Medications   magnesium sulfate 2 g in 50 mL sterile water intermittent infusion (0 g Intravenous Hold 9/22/23  1147)   valsartan (DIOVAN) tablet 40 mg (40 mg Oral $Given 9/22/23 1152)   carvedilol (COREG) tablet 12.5 mg (12.5 mg Oral $Given 9/22/23 1152)      Social Determinants of Health affecting care:   Healthcare Access/Compliance, Transportation, and Social Connections/Isolation    Disposition:  Discharged    Impression & Plan    Medical Decision Making:  I performed extensive electronic chart review, he has been seen on numerous occasions for at least a year for repeated syncope, and has had extensive cardiac work-up including echocardiogram and multiple CT coronary angiograms.  He had a conventional coronary angiogram in 2019 that was reportedly benign.  Given his age and comorbidities is certainly considered the possibility of dangerous even transient arrhythmias.  Complicating matters is his ongoing cocaine use, history of medication noncompliance, and multiple prior departures from the hospital [facility].  Alternate etiologies including vasovagal episodes, intoxication or withdrawal state, psychiatric illness, hypotension from sepsis and many others were considered as possible causes of today's loss of consciousness.  Highly doubt seizure.  I note that his blood pressure is quite high, though this has been the case on multiple prior visits as well and can be explained by suboptimal medication compliance as well as cocaine use.  He does not have chest pain or other features at this time to suggest the likelihood of an acute coronary syndrome and his troponin is exactly in the range that it has been at on multiple prior visits.  He is not hypoxic or febrile.  He was given oral antihypertensive medication and magnesium has been ordered by my colleague before I even met the patient based on the long QT interval, which remains long on repeat.  Even acknowledging his comorbidities and risk factors, I do not think that repeat hospitalization would be of benefit at this time.  The fact that he has had these symptoms on numerous  occasions over a long period of time does certainly argue in favor of some degree of clinical stability, while I did acknowledge the diagnostic uncertainty directly with the patient.  Highly doubt pulmonary embolism.  He was ultimately discharged home after discussion of the above.  Emphasized importance of close outpatient follow-up and a Zio patch monitor was placed on him here in the ED prior to his discharge to gather more information to help his outpatient teams.  He should return here for sudden worsening at any hour.    Diagnosis:    ICD-10-CM    1. Recurrent syncope  R55       2. Hypertension, unspecified type  I10       3. Cocaine use  F14.90       4. Noncompliance with medication regimen  Z91.148       5. Syncope, unspecified syncope type  R55 Follow-Up with Cardiology [facility]     Leadless EKG Monitor 3 to 14 Days     Leadless EKG Monitor 3 to 14 Days     Leadless EKG Monitor 3 to 14 Days         9/22/2023   MD Monika Ewing Jeffrey Alan, MD  09/22/23 150

## 2023-09-22 NOTE — ED TRIAGE NOTES
"Syncope - pt reports going to bathroom this morning \"passed out \" for approx 4 min  Denies any injuries denies chest pain      Triage Assessment       Row Name 09/22/23 0728       Triage Assessment (Adult)    Airway WDL WDL       Respiratory WDL    Respiratory WDL WDL       Cardiac WDL    Cardiac WDL WDL       Cognitive/Neuro/Behavioral WDL    Cognitive/Neuro/Behavioral WDL WDL                    "

## 2025-01-06 ENCOUNTER — HOSPITAL ENCOUNTER (EMERGENCY)
Facility: CLINIC | Age: 70
Discharge: LEFT BEFORE DISCHARGE | End: 2025-01-06
Attending: EMERGENCY MEDICINE | Admitting: EMERGENCY MEDICINE
Payer: COMMERCIAL

## 2025-01-06 VITALS
OXYGEN SATURATION: 97 % | SYSTOLIC BLOOD PRESSURE: 207 MMHG | HEART RATE: 81 BPM | WEIGHT: 175 LBS | DIASTOLIC BLOOD PRESSURE: 128 MMHG | BODY MASS INDEX: 26.52 KG/M2 | HEIGHT: 68 IN | RESPIRATION RATE: 16 BRPM | TEMPERATURE: 97.5 F

## 2025-01-06 DIAGNOSIS — K59.00 CONSTIPATION, UNSPECIFIED CONSTIPATION TYPE: ICD-10-CM

## 2025-01-06 PROCEDURE — 99281 EMR DPT VST MAYX REQ PHY/QHP: CPT

## 2025-01-06 ASSESSMENT — ACTIVITIES OF DAILY LIVING (ADL): ADLS_ACUITY_SCORE: 47

## 2025-01-06 NOTE — ED PROVIDER NOTES
Patient was triaged and subsequently moved in our electronic health record into a room.  I assigned myself to the patient however upon going to see the patient he was not present in the room.  Nursing relays that the patient used the restroom and had a bowel movement and was feeling improved with no symptoms and chose to leave without seeing a physician.  I did not physically examine or interact with the patient.     Trigger, Demetrio Alicea MD  01/06/25 2136

## 2025-01-06 NOTE — ED TRIAGE NOTES
Pt has not had a BM in 4-5 days   Pt has no abd pain or vomiting  Pt is just concerned about not having a BM yet

## 2025-01-12 ENCOUNTER — HOSPITAL ENCOUNTER (EMERGENCY)
Facility: CLINIC | Age: 70
Discharge: HOME | End: 2025-01-12
Attending: EMERGENCY MEDICINE | Admitting: EMERGENCY MEDICINE
Payer: COMMERCIAL

## 2025-01-12 VITALS
DIASTOLIC BLOOD PRESSURE: 82 MMHG | HEART RATE: 79 BPM | SYSTOLIC BLOOD PRESSURE: 135 MMHG | OXYGEN SATURATION: 99 % | BODY MASS INDEX: 27.28 KG/M2 | WEIGHT: 180 LBS | HEIGHT: 68 IN | TEMPERATURE: 97.8 F | RESPIRATION RATE: 16 BRPM

## 2025-01-12 DIAGNOSIS — A53.9 SYPHILIS: ICD-10-CM

## 2025-01-12 PROCEDURE — 96372 THER/PROPH/DIAG INJ SC/IM: CPT | Performed by: EMERGENCY MEDICINE

## 2025-01-12 PROCEDURE — 87491 CHLMYD TRACH DNA AMP PROBE: CPT | Performed by: EMERGENCY MEDICINE

## 2025-01-12 PROCEDURE — 250N000011 HC RX IP 250 OP 636: Performed by: EMERGENCY MEDICINE

## 2025-01-12 PROCEDURE — 99284 EMERGENCY DEPT VISIT MOD MDM: CPT

## 2025-01-12 PROCEDURE — 36415 COLL VENOUS BLD VENIPUNCTURE: CPT | Performed by: EMERGENCY MEDICINE

## 2025-01-12 PROCEDURE — 87591 N.GONORRHOEAE DNA AMP PROB: CPT | Performed by: EMERGENCY MEDICINE

## 2025-01-12 PROCEDURE — 86780 TREPONEMA PALLIDUM: CPT | Performed by: EMERGENCY MEDICINE

## 2025-01-12 RX ADMIN — PENICILLIN G BENZATHINE 2.4 MILLION UNITS: 2400000 INJECTION, SUSPENSION INTRAMUSCULAR at 12:07

## 2025-01-12 ASSESSMENT — ACTIVITIES OF DAILY LIVING (ADL)
ADLS_ACUITY_SCORE: 47

## 2025-01-12 ASSESSMENT — COLUMBIA-SUICIDE SEVERITY RATING SCALE - C-SSRS
6. HAVE YOU EVER DONE ANYTHING, STARTED TO DO ANYTHING, OR PREPARED TO DO ANYTHING TO END YOUR LIFE?: NO
1. IN THE PAST MONTH, HAVE YOU WISHED YOU WERE DEAD OR WISHED YOU COULD GO TO SLEEP AND NOT WAKE UP?: NO
2. HAVE YOU ACTUALLY HAD ANY THOUGHTS OF KILLING YOURSELF IN THE PAST MONTH?: NO

## 2025-01-12 NOTE — DISCHARGE INSTRUCTIONS
We will empirically treat you for syphilis given the wound on your penis.  Testing for chlamydia and gonorrhea are pending and if these return is positive our infection control nurse will contact you.

## 2025-01-12 NOTE — ED TRIAGE NOTES
Pt states that he found a scab on penis and took a shower and the scab came off. Now having puss and itchiness.      Triage Assessment (Adult)       Row Name 01/12/25 0842          Respiratory WDL    Respiratory WDL WDL        Cardiac WDL    Cardiac WDL WDL        Cognitive/Neuro/Behavioral WDL    Cognitive/Neuro/Behavioral WDL WDL

## 2025-01-12 NOTE — ED PROVIDER NOTES
"  Emergency Department Note      History of Present Illness     Chief Complaint   Exposure to STD    HPI   Abhilash Gonzalez is a 69 year old male with a history of hypertension who presents to the ED for concern of an STI. Recently, the patient noted that he had a scab on his penis that did come off in the shower today. Since then, his penis has been itchy, and he states his glans has been red. He has been having unprotected intercourse. Unknown exposure to any STIs. He does have a history of syphilis as a teen. He also endorses a recent cold. No fevers.     Independent Historian   None    Review of External Notes   Reviewed a cardiology office visit note from 2/22/2024. Patient has a history of CHF.    Past Medical History   Medical History and Problem List   CHF  Hypertension  Depression  Neurogenic pain  Substance use disorder  Pulmonary tuberculosis  Chronic kidney disease  Tobacco use disorder  Degenerative disc disease  Osteoarthritis    Medications   Aspirin 81 mg  Diazepam  Jardiance  Finasteride  Metoprolol succinate  Simvastatin  Albuterol  Amlodipine  Benzonatate  Cefuroxime  Clonidine  Gabapentin  Methocarbamol  Nitroglycerin  Tamsulosin  Trazodone    Surgical History   Bronchoscopy  Colonoscopy  Hand surgery  Mandible surgery  Knee surgery    Physical Exam   Patient Vitals for the past 24 hrs:   BP Temp Temp src Pulse Resp SpO2 Height Weight   01/12/25 1306 135/82 -- -- -- -- -- -- --   01/12/25 0833 (!) 165/113 97.8  F (36.6  C) Temporal 79 16 99 % 1.727 m (5' 8\") 81.6 kg (180 lb)     Physical Exam  General: Alert, interactive   Head:  Scalp is atraumatic  Eyes:  The pupils are equal, round, and reactive to light    EOM's intact    No scleral icterus  ENT:      Nose:  The external nose is normal  Ears:  External ears are normal      Neck:  Normal range of motion.      There is no rigidity.    Trachea is in the midline         CV:  Regular rate and rhythm    No murmur   Resp:  Breath sounds are clear " bilaterally    Non-labored, no retractions or accessory muscle use      :  Circumcised penis, circular erythematous lesion consistent with a chancre on the ventral aspect, no surrounding erythema to suggest cellulitis or more concerning illness, no vesicular lesions.      MS:  Normal strength in all 4 extremities  Psych: Awake. Alert.  Normal affect.      Appropriate interactions.    Diagnostics   Lab Results   GC/chlamydia/syphilis screening is all pending    Imaging   No orders to display     Independent Interpretation   None    ED Course    Medications Administered   Medications   Penicillin G Benzathine (BICILLIN L-A) injection 2.4 Million Units (2.4 Million Units Intramuscular $Given 1/12/25 1207)     Procedures   Procedures     Discussion of Management   None    ED Course   ED Course as of 01/12/25 1325   Sun Jan 12, 2025   1050 I obtained history and examined the patient as noted above.       Additional Documentation  None    Medical Decision Making / Diagnosis   CMS Diagnoses: None    MIPS       None    MDM   Abhilash Gonzalez is a 69 year old male presenting with a painless chancre on the shaft of his penis concerning for syphilis.  He received penicillin here and testing is pending.  He will need to be contacted by our infection control nurse if the screening test returns positive.  GC chlamydia is also pending.  The patient is otherwise well-appearing and I believe he can safely be discharged home there is no signs of abscess cellulitis or more concerning illness.    Disposition   The patient was discharged.     Diagnosis     ICD-10-CM    1. Syphilis  A53.9 CANCELED: Treponema Abs w Reflex to RPR and Titer         Discharge Medications   Discharge Medication List as of 1/12/2025 12:55 PM        Scribe Disclosure:  John LOONEY, am serving as a scribe at 11:17 AM on 1/12/2025 to document services personally performed by Demetrio Quintana MD based on my observations and the provider's  statements to me.        Demetrio Quintana MD  01/12/25 7038

## 2025-01-13 LAB
C TRACH DNA SPEC QL NAA+PROBE: NEGATIVE
N GONORRHOEA DNA SPEC QL NAA+PROBE: NEGATIVE
SPECIMEN TYPE: NORMAL
SPECIMEN TYPE: NORMAL

## 2025-01-14 LAB — T PALLIDUM AB SER QL: NONREACTIVE

## 2025-02-16 ENCOUNTER — HOSPITAL ENCOUNTER (EMERGENCY)
Facility: CLINIC | Age: 70
Discharge: HOME | End: 2025-02-16
Attending: EMERGENCY MEDICINE | Admitting: EMERGENCY MEDICINE
Payer: COMMERCIAL

## 2025-02-16 VITALS
SYSTOLIC BLOOD PRESSURE: 156 MMHG | RESPIRATION RATE: 16 BRPM | OXYGEN SATURATION: 99 % | WEIGHT: 179 LBS | TEMPERATURE: 98 F | BODY MASS INDEX: 27.22 KG/M2 | DIASTOLIC BLOOD PRESSURE: 127 MMHG | HEART RATE: 92 BPM

## 2025-02-16 DIAGNOSIS — I10 BENIGN ESSENTIAL HYPERTENSION: ICD-10-CM

## 2025-02-16 DIAGNOSIS — N48.9 LESION OF PENIS: ICD-10-CM

## 2025-02-16 DIAGNOSIS — I10 ESSENTIAL HYPERTENSION: ICD-10-CM

## 2025-02-16 LAB — HIV 1+2 AB+HIV1 P24 AG SERPL QL IA: NONREACTIVE

## 2025-02-16 PROCEDURE — 87389 HIV-1 AG W/HIV-1&-2 AB AG IA: CPT | Performed by: EMERGENCY MEDICINE

## 2025-02-16 PROCEDURE — 36415 COLL VENOUS BLD VENIPUNCTURE: CPT | Performed by: EMERGENCY MEDICINE

## 2025-02-16 PROCEDURE — 86780 TREPONEMA PALLIDUM: CPT | Performed by: EMERGENCY MEDICINE

## 2025-02-16 PROCEDURE — 250N000011 HC RX IP 250 OP 636: Performed by: EMERGENCY MEDICINE

## 2025-02-16 PROCEDURE — 96372 THER/PROPH/DIAG INJ SC/IM: CPT | Performed by: EMERGENCY MEDICINE

## 2025-02-16 PROCEDURE — 99284 EMERGENCY DEPT VISIT MOD MDM: CPT

## 2025-02-16 PROCEDURE — 87591 N.GONORRHOEAE DNA AMP PROB: CPT | Performed by: EMERGENCY MEDICINE

## 2025-02-16 RX ORDER — AMLODIPINE BESYLATE 10 MG/1
10 TABLET ORAL DAILY
Qty: 30 TABLET | Refills: 0 | Status: SHIPPED | OUTPATIENT
Start: 2025-02-16 | End: 2025-03-18

## 2025-02-16 RX ADMIN — PENICILLIN G BENZATHINE 2.4 MILLION UNITS: 2400000 INJECTION, SUSPENSION INTRAMUSCULAR at 15:18

## 2025-02-16 ASSESSMENT — ACTIVITIES OF DAILY LIVING (ADL)
ADLS_ACUITY_SCORE: 47

## 2025-02-16 ASSESSMENT — COLUMBIA-SUICIDE SEVERITY RATING SCALE - C-SSRS
6. HAVE YOU EVER DONE ANYTHING, STARTED TO DO ANYTHING, OR PREPARED TO DO ANYTHING TO END YOUR LIFE?: NO
2. HAVE YOU ACTUALLY HAD ANY THOUGHTS OF KILLING YOURSELF IN THE PAST MONTH?: NO
1. IN THE PAST MONTH, HAVE YOU WISHED YOU WERE DEAD OR WISHED YOU COULD GO TO SLEEP AND NOT WAKE UP?: NO

## 2025-02-16 NOTE — ED PROVIDER NOTES
Emergency Department Note      History of Present Illness     Chief Complaint   Rash      HPI   Abhilash Gonzalez is a 69 year old male with history of HFrEF, CKD 3a, and BPH who presents to the ED for evaluation of a rash. Patient states the skin of his penis started peeling 2 days ago and he is in constant pain. He endorses chills and diarrhea. Denies nausea, vomiting, dysuria, hematuria, abdominal pain, or penile discharge. He presented to the ED on 1/12/25 for itching and sores on his penis. He was given a penicillin shot which he states improved his symptoms until recently. His syphilis testing was negative at that time. He reports having unprotected sex once since then. Of note, the patient is also requesting refills of amlodipine. He states he ran out 1 week ago. He is noted to be allergic to lisinopril in his chart. He states he has an appointment with his primary on 2/25.    Independent Historian   None    Review of External Notes   I reviewed ED note from 1/12/2025 when the patient presented with concerns for an STD and was given a penicillin shot.    Past Medical History     Medical History and Problem List   MDD  DJD  Hypertension  TB lung, latent  Osteoarthritis of spine  Chronic low back pain  Cocaine use disorder  Tobacco use disorder  Chronic cough  Spondylosis  Neuralgia and neuritis  Cecal volvulus  Syncope  BPH  Chronic HFrEF  CKD stage 3a    Medications   Amlodipine  Clonidine  Gabapentin  Tamsulosin  Trazodone  Finasteride  Aspirin 81 mg  Metoprolol succinate  Simvastatin  Sacubitril-valsartan    Surgical History   Bronchoscopy  Colonoscopy  Right palm surgery  Mandible surgery  Right knee surgery    Physical Exam     Patient Vitals for the past 24 hrs:   BP Temp Temp src Pulse Resp SpO2 Weight   02/16/25 1231 (!) 166/118 98  F (36.7  C) Temporal 82 16 99 % 81.2 kg (179 lb)     Physical Exam  General: No acute distress.  Head: No obvious trauma to head.  Eyes:  Conjunctivae clear.   Neck:  Normal range of motion.   CV: Skin is well perfused, no cyanosis  Respiratory: Effort normal. No audible wheezing.  Gastrointestinal: Non-distended.  Musculoskeletal: Normal range of motion. No gross deformities.  Neuro: Alert. Moving all extremities appropriately.  Normal speech.    Skin: No rashes or lesions on exposed skin.   : Penis and testicles nontender to palpation. No vesicular rash. Dry, erythematous lesion to the ventral aspect of the penis that is nontender.    Diagnostics     Lab Results   Labs Ordered and Resulted from Time of ED Arrival to Time of ED Departure - No data to display    Imaging   None    Independent Interpretation   None    ED Course      Medications Administered   Medications   Penicillin G Benzathine (BICILLIN L-A) injection 2.4 Million Units (has no administration in time range)       Discussion of Management   None    ED Course   ED Course as of 02/16/25 1448   Sun Feb 16, 2025   1334 I obtained history and examined the patient as noted above.    1448 I rechecked the patient and explained findings. We discussed plans for discharge and the patient is comfortable with this plan.        Additional Documentation  None    Medical Decision Making / Diagnosis     CMS Diagnoses: None    MIPS   None    MDM   Abhilash Gonzalez is a 69 year old male presenting for concern of a penile rash.  He does not appear toxic and is hemodynamically stable.  Exam does reveal a lesion on the penis, which could be consistent with cellulitis.  Of note, the patient was evaluated last month for similar symptoms and was empirically treated for syphilis but treponema was negative.  The patient states he improved after he was given the penicillin.  HIV, GC chlamydia and treponema are obtained, and he is given a dose of IM penicillin G.  He has an appointment with his primary care provider in 9 days.  He is encouraged to keep that appointment.  Return precautions are given and he verbalizes understanding.  He is  discharged home in stable condition.        Disposition   The patient was discharged.     Diagnosis     ICD-10-CM    1. Benign essential hypertension  I10 amLODIPine (NORVASC) 10 MG tablet      2. Essential hypertension  I10       3. Lesion of penis  N48.9            Discharge Medications   Current Discharge Medication List            Scribe Disclosure:  AAYUSH, Nathaly Mile, am serving as a scribe at 1:53 PM on 2/16/2025 to document services personally performed by Jaskaran Hu MD based on my observations and the provider's statements to me.        Jaskaran Hu MD  02/16/25 5107

## 2025-02-16 NOTE — ED TRIAGE NOTES
"Pt comes in for skin peeling on penis, itching and redness on scrotum. Pt said he was here a month ago for same thing and was tested for STDs and received a shot and symptoms went away. Same symptoms have been back now for 1 week. Pt on BP meds but says \"I ran out a couple days ago\"     Triage Assessment (Adult)       Row Name 02/16/25 1231          Triage Assessment    Airway WDL WDL        Respiratory WDL    Respiratory WDL WDL        Skin Circulation/Temperature WDL    Skin Circulation/Temperature WDL WDL        Cardiac WDL    Cardiac WDL WDL        Peripheral/Neurovascular WDL    Peripheral Neurovascular WDL WDL        Cognitive/Neuro/Behavioral WDL    Cognitive/Neuro/Behavioral WDL WDL                     "

## 2025-02-16 NOTE — DISCHARGE INSTRUCTIONS
We are giving you a prescription your blood pressure medication.  Will contact you if any of your test become positive for infection.  Keep your appointment with your primary care provider on the 25th.  Please return the emergency department if you develop any new or concerning symptoms.

## 2025-02-17 LAB
C TRACH DNA SPEC QL PROBE+SIG AMP: NEGATIVE
N GONORRHOEA DNA SPEC QL NAA+PROBE: NEGATIVE
SPECIMEN TYPE: NORMAL
T PALLIDUM AB SER QL: NONREACTIVE

## 2025-07-28 ENCOUNTER — APPOINTMENT (OUTPATIENT)
Dept: GENERAL RADIOLOGY | Facility: CLINIC | Age: 70
End: 2025-07-28
Attending: STUDENT IN AN ORGANIZED HEALTH CARE EDUCATION/TRAINING PROGRAM
Payer: COMMERCIAL

## 2025-07-28 ENCOUNTER — HOSPITAL ENCOUNTER (EMERGENCY)
Facility: CLINIC | Age: 70
Discharge: HOME | End: 2025-07-28
Attending: STUDENT IN AN ORGANIZED HEALTH CARE EDUCATION/TRAINING PROGRAM
Payer: COMMERCIAL

## 2025-07-28 ENCOUNTER — APPOINTMENT (OUTPATIENT)
Dept: MRI IMAGING | Facility: CLINIC | Age: 70
End: 2025-07-28
Attending: STUDENT IN AN ORGANIZED HEALTH CARE EDUCATION/TRAINING PROGRAM
Payer: COMMERCIAL

## 2025-07-28 VITALS
TEMPERATURE: 97.5 F | HEART RATE: 73 BPM | OXYGEN SATURATION: 99 % | DIASTOLIC BLOOD PRESSURE: 120 MMHG | SYSTOLIC BLOOD PRESSURE: 192 MMHG | RESPIRATION RATE: 16 BRPM

## 2025-07-28 DIAGNOSIS — M21.371 FOOT DROP, RIGHT: Primary | ICD-10-CM

## 2025-07-28 DIAGNOSIS — M54.16 LUMBAR RADICULOPATHY: ICD-10-CM

## 2025-07-28 PROCEDURE — 72148 MRI LUMBAR SPINE W/O DYE: CPT

## 2025-07-28 PROCEDURE — 73562 X-RAY EXAM OF KNEE 3: CPT | Mod: RT

## 2025-07-28 PROCEDURE — 99284 EMERGENCY DEPT VISIT MOD MDM: CPT | Mod: 25 | Performed by: STUDENT IN AN ORGANIZED HEALTH CARE EDUCATION/TRAINING PROGRAM

## 2025-07-28 PROCEDURE — 73590 X-RAY EXAM OF LOWER LEG: CPT | Mod: RT

## 2025-07-28 RX ORDER — METHYLPREDNISOLONE 4 MG/1
TABLET ORAL
Qty: 21 TABLET | Refills: 0 | Status: SHIPPED | OUTPATIENT
Start: 2025-07-28

## 2025-07-28 ASSESSMENT — ACTIVITIES OF DAILY LIVING (ADL)
ADLS_ACUITY_SCORE: 47

## 2025-07-28 NOTE — DISCHARGE INSTRUCTIONS
Return to the emergency department if symptoms are worsening, become concerning, or for any other concerns.     Please contact [facility] orthopedics for follow-up as you will likely need a surgical procedure on your spine.

## 2025-07-28 NOTE — ED TRIAGE NOTES
Right foot numbness starting Saturday morning.  Has a history of back pain, and high blood pressure, but has run out of his medications.

## 2025-07-28 NOTE — ED PROVIDER NOTES
Emergency Department Note      History of Present Illness     Chief Complaint   Numbness      HPI   Abhilash Gonzalez is a 69 year old male with a history of hypertension, degenerative arthritis of the spine, and neuropathy presenting to the ED for evaluation of numbness. The patient reports that he experienced a sudden onset numbness to the anterior right lower leg through the dorsum of the foot two days ago. This occurred after he had been sitting, and when he stood up, his foot was numb and he can not lift the foot at the ankle. These symptoms have been constant since then, and have not improved. He mentions that his right arm occasionally feels numb after lying on it, but this has been going on for years and it is not numb on evaluation. He has some intermittent low back pain as well, but does not endorse this currently. No incontinence of bowel or bladder, or numbness to the groin. No headache, double vision, vision loss, or recent trauma to the leg. He does not endorse any new activities, but states that he does a substantial amount of walking.     Independent Historian   None    Review of External Notes   none    Past Medical History     Medical History and Problem List   Depression   DJD  Hypertension  Polysubstance abuse  Tuberculosis   Neuropathy  Degenerative arthritis  Osteoarthritis  Tobacco use  DDD    Medications   Norvasc  Ceftin  Catapres  Neurontin  Zestril   Robaxin  Nitrostat  Flomax  Desyrel     Surgical History   Bronchoscopy  Hand surgery  Mandible surgery  Knee surgery     Physical Exam     Patient Vitals for the past 24 hrs:   BP Temp Temp src Pulse Resp SpO2   07/28/25 1206 (!) 192/120 -- -- 73 -- --   07/28/25 1200 (!) 184/128 -- -- -- -- --   07/28/25 0833 (!) 160/100 -- -- 75 -- 99 %   07/28/25 0831 -- 97.5  F (36.4  C) Temporal 63 16 --     Physical Exam  GENERAL: Patient well-appearing  HEAD: Atraumatic.  EYES: Anicteric. PERRL  NOSE: No bleeding  MOUTH: Moist mucosa  THROAT: Patent  airway.   NECK: No rigidity  CV: RRR, no murmurs, rubs or gallops  PULM: CTAB with good aeration; no retractions, rales, rhonchi, or wheezing  ABD: Soft, nontender, nondistended, no guarding, no peritoneal signs   DERM: No rash. Skin warm and dry  EXTREMITY: Moving all extremities without difficulty. No calf tenderness or peripheral edema  VASCULAR: Symmetric pulses bilaterally  NEURO: Alert, answering questions appropriately. Speaks clearly. CN 2-12 fully intact. Strength 5/5 bilateral LE/UE, except cannot dorsiflex the right ankle. Right ankle plantar flexion 5/5. Right knee flexion/extension and hip flexion strength 5/5. Bilateral patella reflexes 1+. He has lost sensation at the right superficial perineal distribution, but has intact sensation elsewhere to the foot and leg. No nystagmus. No visual field cut.      Diagnostics     Lab Results   Labs Ordered and Resulted from Time of ED Arrival to Time of ED Departure - No data to display    Imaging   Lumbar spine MRI w/o contrast   Final Result   IMPRESSION:   1.  Multilevel lumbar spondylosis, most pronounced at L4-5 where there is moderate to severe spinal canal and moderate to severe bilateral neural foraminal stenosis.   2.  Severe bilateral neural foraminal stenosis with impingement of the exiting nerve roots at L5-S1.   3.  Modic type I changes at the opposing L4-5 and L5-S1 endplates.      XR Knee Right 3 Views   Final Result   IMPRESSION: Tricompartmental degenerative arthrosis with marginal osteophytes, chondrocalcinosis, and advanced narrowing of the lateral compartment. Genu valgus. No acute fracture or joint effusion.       XR Tibia and Fibula Right 2 Views   Final Result   IMPRESSION: Anatomic alignment tibia and fibula. No acute displaced tibia or fibula fracture is identified. Degenerative change in the right knee in the lateral compartment is partially seen. No localizing right leg soft tissue swelling identified. MRI    could further assess for  soft tissue mass.      Report per radiology.    Independent Interpretation   X-ray right tibia and fibula shows no acute fracture.    ED Course      Medications Administered   Medications - No data to display    Procedures   Procedures     Discussion of Management   See ED course.     ED Course   ED Course as of 07/28/25 1257   Mon Jul 28, 2025   0840 I obtained history and examined the patient as noted above.    1155 I spoke with CARMEN Montoya, orthopedics, regarding the patient's history and presentation in the emergency department today.    1224 I followed up with Dr. Radha Montoya, orthopedics.    1230 I rechecked and updated the patient.     1254 I spoke with Dr. Alvarado, stroke neurology, regarding the patient's history and presentation in the emergency department today.      Additional Documentation  None    Medical Decision Making / Diagnosis     CMS Diagnoses: None    MIPS   CT/MRI of the lumbar spine was obtained because of neurologic signs or symptoms (lower extremity weakness).            TROY Gonzalez is a 69 year old male presenting with right foot drop and numbness in the right superficial peroneal nerve distribution.  Patient is very well-appearing.  His neurologic exam is otherwise very reassuring.  He has intact sensation along the right medial foot and plantar surface of the foot.  Furthermore, his right plantar, right knee flexion/extension and right hip flexion strength is 5 out of 5.  He has no saddle anesthesia or bowel or bladder incontinence.  Overall, clinical picture seems consistent with primary lumbar radiculopathy pathology.  Therefore MRI lumbar spine ordered.  He has severe lumbar changes that explain the symptomatology.  I did discuss with orthopedic surgery who discussed with Dr. Olivo and they recommend the patient does not require admission but that he can follow-up closely as an outpatient with [facility] spine and that these MRI findings definitely could  explain his symptoms.  I also discussed with stroke neurology and they agree that this seems to be a primary spine issue and that patient does not require brain MRI.  I also did x-rays of the right leg and do not see any acute compressive process.  All questions answered.  Given strict return precautions.  Discharged in stable condition.  Did give a Medrol Dosepak for home and a walking boot.    Disposition   The patient was discharged.     Diagnosis     ICD-10-CM    1. Foot drop, right  M21.371 Ankle/Foot Bracing Supplies Order Walking Boot; Right; Pneumatic      2. Lumbar radiculopathy  M54.16 Ankle/Foot Bracing Supplies Order Walking Boot; Right; Pneumatic         Discharge Medications   New Prescriptions    METHYLPREDNISOLONE (MEDROL DOSEPAK) 4 MG TABLET THERAPY PACK    Follow Package Directions     Scribe Disclosure:  I, Ciara Franklin, am serving as a scribe at 8:45 AM on 7/28/2025 to document services personally performed by Sly Tripathi MD based on my observations and the provider's statements to me.        Sly Tripathi MD  07/28/25 2038

## (undated) RX ORDER — NITROGLYCERIN 0.4 MG/1
TABLET SUBLINGUAL
Status: DISPENSED
Start: 2019-08-05

## (undated) RX ORDER — DOBUTAMINE HYDROCHLORIDE 200 MG/100ML
INJECTION INTRAVENOUS
Status: DISPENSED
Start: 2019-08-04

## (undated) RX ORDER — METOPROLOL TARTRATE 1 MG/ML
INJECTION, SOLUTION INTRAVENOUS
Status: DISPENSED
Start: 2019-08-04

## (undated) RX ORDER — ATROPINE SULFATE 0.4 MG/ML
AMPUL (ML) INJECTION
Status: DISPENSED
Start: 2019-08-04

## (undated) RX ORDER — METOPROLOL TARTRATE 1 MG/ML
INJECTION, SOLUTION INTRAVENOUS
Status: DISPENSED
Start: 2019-08-05

## (undated) RX ORDER — REGADENOSON 0.08 MG/ML
INJECTION, SOLUTION INTRAVENOUS
Status: DISPENSED
Start: 2018-04-10